# Patient Record
Sex: FEMALE | Race: WHITE | NOT HISPANIC OR LATINO | Employment: FULL TIME | URBAN - METROPOLITAN AREA
[De-identification: names, ages, dates, MRNs, and addresses within clinical notes are randomized per-mention and may not be internally consistent; named-entity substitution may affect disease eponyms.]

---

## 2017-07-14 DIAGNOSIS — Z12.31 SCREENING MAMMOGRAM FOR HIGH-RISK PATIENT: Primary | ICD-10-CM

## 2017-12-11 ENCOUNTER — HOSPITAL ENCOUNTER (OUTPATIENT)
Dept: RADIOLOGY | Facility: HOSPITAL | Age: 47
Discharge: HOME | End: 2017-12-11
Attending: FAMILY MEDICINE
Payer: COMMERCIAL

## 2017-12-11 VITALS — BODY MASS INDEX: 27.2 KG/M2 | WEIGHT: 190 LBS | HEIGHT: 70 IN

## 2017-12-11 DIAGNOSIS — Z12.31 ENCOUNTER FOR SCREENING MAMMOGRAM FOR HIGH-RISK PATIENT: ICD-10-CM

## 2017-12-11 PROCEDURE — 77067 SCR MAMMO BI INCL CAD: CPT | Mod: TC

## 2017-12-11 PROCEDURE — 77067 SCR MAMMO BI INCL CAD: CPT | Mod: 26,,, | Performed by: RADIOLOGY

## 2018-02-26 ENCOUNTER — OFFICE VISIT (OUTPATIENT)
Dept: OBSTETRICS AND GYNECOLOGY | Facility: CLINIC | Age: 48
End: 2018-02-26
Payer: COMMERCIAL

## 2018-02-26 VITALS
BODY MASS INDEX: 29.01 KG/M2 | WEIGHT: 202.63 LBS | DIASTOLIC BLOOD PRESSURE: 86 MMHG | HEIGHT: 70 IN | SYSTOLIC BLOOD PRESSURE: 124 MMHG

## 2018-02-26 DIAGNOSIS — Z01.419 ENCOUNTER FOR GYNECOLOGICAL EXAMINATION WITHOUT ABNORMAL FINDING: Primary | ICD-10-CM

## 2018-02-26 DIAGNOSIS — N89.8 VAGINAL DISCHARGE: ICD-10-CM

## 2018-02-26 DIAGNOSIS — N32.89 BLADDER SPASM: ICD-10-CM

## 2018-02-26 DIAGNOSIS — R35.0 URINARY FREQUENCY: ICD-10-CM

## 2018-02-26 LAB
C TRACH DNA SPEC QL NAA+PROBE: NOT DETECTED
CANDIDA RRNA VAG QL PROBE: NEGATIVE
G VAGINALIS RRNA GENITAL QL PROBE: NEGATIVE
N GONORRHOEA DNA SPEC QL NAA+PROBE: NOT DETECTED
T VAGINALIS RRNA GENITAL QL PROBE: NEGATIVE

## 2018-02-26 PROCEDURE — 87480 CANDIDA DNA DIR PROBE: CPT

## 2018-02-26 PROCEDURE — 87077 CULTURE AEROBIC IDENTIFY: CPT

## 2018-02-26 PROCEDURE — 87088 URINE BACTERIA CULTURE: CPT

## 2018-02-26 PROCEDURE — 87186 SC STD MICRODIL/AGAR DIL: CPT

## 2018-02-26 PROCEDURE — 87491 CHLMYD TRACH DNA AMP PROBE: CPT

## 2018-02-26 PROCEDURE — 87086 URINE CULTURE/COLONY COUNT: CPT

## 2018-02-26 PROCEDURE — 99386 PREV VISIT NEW AGE 40-64: CPT | Mod: S$GLB,,, | Performed by: OBSTETRICS & GYNECOLOGY

## 2018-02-26 PROCEDURE — 88175 CYTOPATH C/V AUTO FLUID REDO: CPT

## 2018-02-26 PROCEDURE — 99999 PR PBB SHADOW E&M-EST. PATIENT-LVL III: CPT | Mod: PBBFAC,,, | Performed by: OBSTETRICS & GYNECOLOGY

## 2018-02-26 RX ORDER — OXYBUTYNIN CHLORIDE 5 MG/1
5 TABLET ORAL 3 TIMES DAILY
Qty: 30 TABLET | Refills: 0 | Status: SHIPPED | OUTPATIENT
Start: 2018-02-26 | End: 2018-03-21

## 2018-02-26 RX ORDER — PHENAZOPYRIDINE HYDROCHLORIDE 200 MG/1
TABLET, FILM COATED ORAL
COMMUNITY
Start: 2018-02-24 | End: 2018-03-21 | Stop reason: ALTCHOICE

## 2018-02-26 NOTE — PROGRESS NOTES
HISTORY OF PRESENT ILLNESS:    Harpreet Peralta is a 48 y.o. female, , Patient's last menstrual period was 2018.,  presents for a routine exam. Patient reports being seen in the urgent care for UTI, symptoms improved. Patient took Bactrim and Macrobid, currently taking pyridium. Patient also treated for BV. Symptoms resolved  but now dysuria & pain has returned for the last 3 days.     Past Medical History:   Diagnosis Date    Anxiety     Depression     Urinary tract infection 2013    Staphylococcus saprophyticus       History reviewed. No pertinent surgical history.    MEDICATIONS AND ALLERGIES:      Current Outpatient Prescriptions:     citalopram (CELEXA) 10 MG tablet, Take 10 mg by mouth once daily.  , Disp: , Rfl:     citalopram (CELEXA) 20 MG tablet, Take 20 mg by mouth once daily.  , Disp: , Rfl:     oxybutynin (DITROPAN) 5 MG Tab, Take 1 tablet (5 mg total) by mouth 3 (three) times daily., Disp: 30 tablet, Rfl: 0    phenazopyridine (PYRIDIUM) 200 MG tablet, , Disp: , Rfl:     Review of patient's allergies indicates:  No Known Allergies    Family History   Problem Relation Age of Onset    Breast cancer Maternal Uncle 60    Breast cancer Paternal Aunt 70       Social History     Social History    Marital status: Single     Spouse name: N/A    Number of children: N/A    Years of education: N/A     Occupational History    Not on file.     Social History Main Topics    Smoking status: Never Smoker    Smokeless tobacco: Not on file    Alcohol use Yes      Comment: socially    Drug use: No    Sexual activity: Yes     Partners: Male     Other Topics Concern    Not on file     Social History Narrative    No narrative on file       COMPREHENSIVE GYN HISTORY:  PAP History: Denies abnormal Paps.  Infection History: Denies STDs. Denies PID.  Benign History: Denies uterine fibroids. Denies ovarian cysts. Denies endometriosis. Denies other conditions.  Cancer History: Denies cervical  "cancer. Denies uterine cancer or hyperplasia. Denies ovarian cancer. Denies vulvar cancer or pre-cancer. Denies vaginal cancer or pre-cancer. Denies breast cancer. Denies colon cancer.  Sexual Activity History: Reports currently being sexually active  Menstrual History: Monthly. Mod then light flow.   Dysmenorrhea History: Reports mild dysmenorrhea.       ROS:  GENERAL: No weight changes. No swelling. No fatigue. No fever.  CARDIOVASCULAR: No chest pain. No shortness of breath. No leg cramps.   NEUROLOGICAL: No headaches. No vision changes.  BREASTS: No pain. No lumps. No discharge.  ABDOMEN: No pain. No nausea. No vomiting. No diarrhea. No constipation.  REPRODUCTIVE: No abnormal bleeding.   VULVA: No pain. No lesions. No itching.  VAGINA: No relaxation. No itching. No odor. No discharge. No lesions.  URINARY: No incontinence. No nocturia. No frequency. No dysuria.    /86   Ht 5' 10" (1.778 m)   Wt 91.9 kg (202 lb 9.6 oz)   LMP 02/12/2018   BMI 29.07 kg/m²     PE:  APPEARANCE: Well nourished, well developed, in no acute distress.  AFFECT: WNL, alert and oriented x 3.  SKIN: No acne or hirsutism.  NECK: Neck symmetric, without masses or thyromegaly.  NODES: No inguinal, cervical, axillary or femoral lymph node enlargement.  CHEST: Good respiratory effort.   ABDOMEN: Soft. No tenderness or masses. No hepatosplenomegaly. No hernias.  BREASTS: Symmetrical, no skin changes, visible lesions, palpable masses or nipple discharge bilaterally.  PELVIC: External female genitalia without lesions.  Female hair distribution. Adequate perineal body, Normal urethral meatus. Vagina moist and well rugated without lesions or discharge.  No significant cystocele or rectocele present. Cervix pink without lesions, discharge or tenderness. Uterus is 4-6 week size, regular, mobile and nontender. Adnexa without masses or tenderness.  EXTREMITIES: No edema    DIAGNOSIS:  1. Encounter for gynecological examination without abnormal " finding    2. Vaginal discharge    3. Urinary frequency    4. Bladder spasm        COUNSELING:  The patient was counseled today on:  -A.C.S. Pap and pelvic exam guidelines (pap every 3 years), recomendations for yearly mammogram;  -to follow up with her PCP for other health maintenance.    FOLLOW-UP with me annually.   Urine culture today  Patient on Pyridium   Patient to follow up with PCP for recurrent UTI.

## 2018-02-28 ENCOUNTER — OFFICE VISIT (OUTPATIENT)
Dept: INTERNAL MEDICINE | Facility: CLINIC | Age: 48
End: 2018-02-28
Payer: COMMERCIAL

## 2018-02-28 VITALS
WEIGHT: 201.94 LBS | OXYGEN SATURATION: 96 % | SYSTOLIC BLOOD PRESSURE: 114 MMHG | HEART RATE: 77 BPM | HEIGHT: 71 IN | DIASTOLIC BLOOD PRESSURE: 68 MMHG | BODY MASS INDEX: 28.27 KG/M2

## 2018-02-28 DIAGNOSIS — N39.0 LOWER URINARY TRACT INFECTIOUS DISEASE: Primary | ICD-10-CM

## 2018-02-28 DIAGNOSIS — R30.0 DYSURIA: ICD-10-CM

## 2018-02-28 DIAGNOSIS — R10.2 PELVIC PAIN: ICD-10-CM

## 2018-02-28 LAB
BACTERIA #/AREA URNS AUTO: ABNORMAL /HPF
BACTERIA UR CULT: NORMAL
BILIRUB UR QL STRIP: NEGATIVE
CLARITY UR REFRACT.AUTO: ABNORMAL
COLOR UR AUTO: ABNORMAL
GLUCOSE UR QL STRIP: NEGATIVE
HGB UR QL STRIP: ABNORMAL
HYALINE CASTS UR QL AUTO: 0 /LPF
KETONES UR QL STRIP: NEGATIVE
LEUKOCYTE ESTERASE UR QL STRIP: ABNORMAL
MICROSCOPIC COMMENT: ABNORMAL
NITRITE UR QL STRIP: POSITIVE
PH UR STRIP: 7 [PH] (ref 5–8)
PROT UR QL STRIP: ABNORMAL
RBC #/AREA URNS AUTO: 13 /HPF (ref 0–4)
SP GR UR STRIP: 1.01 (ref 1–1.03)
SQUAMOUS #/AREA URNS AUTO: 1 /HPF
URN SPEC COLLECT METH UR: ABNORMAL
UROBILINOGEN UR STRIP-ACNC: ABNORMAL EU/DL
WBC #/AREA URNS AUTO: >100 /HPF (ref 0–5)
WBC CLUMPS UR QL AUTO: ABNORMAL

## 2018-02-28 PROCEDURE — 81001 URINALYSIS AUTO W/SCOPE: CPT

## 2018-02-28 PROCEDURE — 99999 PR PBB SHADOW E&M-EST. PATIENT-LVL III: CPT | Mod: PBBFAC,,, | Performed by: NURSE PRACTITIONER

## 2018-02-28 PROCEDURE — 99213 OFFICE O/P EST LOW 20 MIN: CPT | Mod: S$GLB,,, | Performed by: NURSE PRACTITIONER

## 2018-02-28 PROCEDURE — 3008F BODY MASS INDEX DOCD: CPT | Mod: S$GLB,,, | Performed by: NURSE PRACTITIONER

## 2018-02-28 RX ORDER — CIPROFLOXACIN 250 MG/1
250 TABLET, FILM COATED ORAL EVERY 12 HOURS
Qty: 6 TABLET | Refills: 0 | Status: SHIPPED | OUTPATIENT
Start: 2018-02-28 | End: 2018-03-21

## 2018-02-28 NOTE — PROGRESS NOTES
INTERNAL MEDICINE PROGRESS/URGENT CARE NOTE    CHIEF COMPLAINT     Chief Complaint   Patient presents with    Dysuria     10 days , recent UC visit       HPI     Harpreet Peralta is a 48 y.o. female with frequent UTIs and functional constipation who presents for an urgent visit today.    Was seen in urgent care a couple times for UTI with dysuria, pelvic pain and pressure x 1 month. Was treated with bactrim and Macrobid and oxybutynin and pyridium. Was also treated for BV with Flagyl. Reports continued pain and pressure despite antibiotics and pain meds.   No imaging done.   Has h/o kidney stones.     Past Medical History:  Past Medical History:   Diagnosis Date    Anxiety     Depression     Urinary tract infection 8/30/2013    Staphylococcus saprophyticus       Home Medications:  Prior to Admission medications    Medication Sig Start Date End Date Taking? Authorizing Provider   oxybutynin (DITROPAN) 5 MG Tab Take 1 tablet (5 mg total) by mouth 3 (three) times daily. 2/26/18 2/26/19 Yes Claudia Fountain MD   phenazopyridine (PYRIDIUM) 200 MG tablet  2/24/18  Yes Historical Provider, MD   citalopram (CELEXA) 10 MG tablet Take 10 mg by mouth once daily.      Historical Provider, MD   citalopram (CELEXA) 20 MG tablet Take 20 mg by mouth once daily.      Historical Provider, MD       Review of Systems:  Review of Systems   Constitutional: Negative for chills, fatigue and fever.   Gastrointestinal: Negative for abdominal pain, constipation, diarrhea, nausea and vomiting.   Genitourinary: Positive for dysuria, pelvic pain and urgency. Negative for flank pain.   Skin: Negative for rash.   Neurological: Negative for dizziness, light-headedness and headaches.       Health Maintainence:   Immunizations:  Health Maintenance       Date Due Completion Date    TETANUS VACCINE 02/11/1986 ---    Pap Smear with HPV Cotest 11/21/2016 11/21/2013    Influenza Vaccine 08/01/2017 ---    Colonoscopy 02/11/2018 ---    Lipid Panel  "08/16/2018 8/16/2013    Mammogram 12/11/2019 12/11/2017           PHYSICAL EXAM     /68 (BP Location: Left arm, Patient Position: Sitting, BP Method: Large (Manual))   Pulse 77   Ht 5' 11" (1.803 m)   Wt 91.6 kg (201 lb 15.1 oz)   LMP 02/12/2018 (Within Days)   SpO2 96%   BMI 28.17 kg/m²     Physical Exam   Constitutional: She is oriented to person, place, and time. She appears well-developed and well-nourished.   HENT:   Head: Normocephalic.   Right Ear: External ear normal.   Left Ear: External ear normal.   Nose: Nose normal.   Mouth/Throat: Oropharynx is clear and moist. No oropharyngeal exudate.   Eyes: Pupils are equal, round, and reactive to light.   Neck: Neck supple. No JVD present. No tracheal deviation present. No thyromegaly present.   Cardiovascular: Normal rate, regular rhythm, normal heart sounds and intact distal pulses.  Exam reveals no gallop and no friction rub.    No murmur heard.  Pulmonary/Chest: Effort normal and breath sounds normal. No respiratory distress. She has no wheezes. She has no rales.   Abdominal: Soft. Bowel sounds are normal. She exhibits no distension. There is no tenderness. There is no CVA tenderness.   Musculoskeletal: Normal range of motion. She exhibits no edema or tenderness.   Lymphadenopathy:     She has no cervical adenopathy.   Neurological: She is alert and oriented to person, place, and time.   Skin: Skin is warm and dry. No rash noted.   Psychiatric: She has a normal mood and affect. Her behavior is normal.   Vitals reviewed.      LABS     No results found for: LABA1C, HGBA1C  CMP  No results found for: NA, K, CL, CO2, GLU, BUN, CREATININE, CALCIUM, PROT, ALBUMIN, BILITOT, ALKPHOS, AST, ALT, ANIONGAP, ESTGFRAFRICA, EGFRNONAA  No results found for: WBC, HGB, HCT, MCV, PLT  No results found for: CHOL  No results found for: HDL  No results found for: LDLCALC  No results found for: TRIG  No results found for: CHOLHDL  No results found for: TSH, N3SDTGK, " O7ZNLCB, THYROIDAB    ASSESSMENT/PLAN     Harpreet Peralta is a 48 y.o. female with  Past Medical History:   Diagnosis Date    Anxiety     Depression     Urinary tract infection 8/30/2013    Staphylococcus saprophyticus     Lower urinary tract infectious disease- preliminary urine culture shows e. Coli. No sensitivity report yet but failed bactrim and Macrobid. Will start cipro bid x 3 days. Short course. Increase fluids. Will cont pyridium and ditropan as needed.   -     ciprofloxacin HCl (CIPRO) 250 MG tablet; Take 1 tablet (250 mg total) by mouth every 12 (twelve) hours.  Dispense: 6 tablet; Refill: 0    Dysuria  -     Urinalysis    Pelvic pain  -     Urinalysis    Will f/u by phone on Friday (2 days) to go over culture report     Follow up with PCP     Patient education provided from Kat. Patient was counseled on when and how to seek emergent care.       Augusta KIRBY, SAVITA, FNP-c   Department of Internal Medicine - [facility]  8:21 AM

## 2018-02-28 NOTE — MEDICAL/APP STUDENT
Subjective:       Patient ID: Harpreet Peralta is a 48 y.o. female.    Chief Complaint: Dysuria (10 days , recent UC visit)    49 yo female presents for symptoms of recurrent UTI. Reports started a month ago.  Reports multiple medications from urgent care, with unresolved symptoms.    Reports urgent care physician was not concerned for kidney stone and did not do imaging. Patient reports burning on urination, suprapubic pain and pressure.  Denies back pain and fever.  Reports taking ditropan and pyridium with minimal relief.        Review of Systems   Constitutional: Negative for chills, fatigue and fever.   Cardiovascular: Negative for chest pain and palpitations.   Gastrointestinal: Negative for diarrhea, nausea and vomiting.   Genitourinary: Positive for dysuria and frequency. Negative for difficulty urinating, flank pain and hematuria.   Musculoskeletal: Negative for back pain.       Objective:      Physical Exam   Constitutional: She is oriented to person, place, and time. She appears well-developed and well-nourished.   HENT:   Head: Normocephalic and atraumatic.   Eyes: Pupils are equal, round, and reactive to light.   Neck: Normal range of motion.   Cardiovascular: Normal rate and regular rhythm.    Pulmonary/Chest: Effort normal and breath sounds normal.   Abdominal: Soft. Bowel sounds are normal. There is tenderness (suprapubic).   Neurological: She is alert and oriented to person, place, and time.   Skin: Skin is warm and dry.       Assessment:         Plan:

## 2018-03-01 ENCOUNTER — TELEPHONE (OUTPATIENT)
Dept: OBSTETRICS AND GYNECOLOGY | Facility: CLINIC | Age: 48
End: 2018-03-01

## 2018-03-01 ENCOUNTER — PATIENT MESSAGE (OUTPATIENT)
Dept: INTERNAL MEDICINE | Facility: CLINIC | Age: 48
End: 2018-03-01

## 2018-03-01 ENCOUNTER — PATIENT MESSAGE (OUTPATIENT)
Dept: OBSTETRICS AND GYNECOLOGY | Facility: CLINIC | Age: 48
End: 2018-03-01

## 2018-03-01 DIAGNOSIS — N39.0 URINARY TRACT INFECTION WITHOUT HEMATURIA, SITE UNSPECIFIED: Primary | ICD-10-CM

## 2018-03-01 RX ORDER — NITROFURANTOIN 25; 75 MG/1; MG/1
100 CAPSULE ORAL 2 TIMES DAILY
Qty: 14 CAPSULE | Refills: 0 | Status: SHIPPED | OUTPATIENT
Start: 2018-03-01 | End: 2018-03-08

## 2018-03-16 ENCOUNTER — PATIENT MESSAGE (OUTPATIENT)
Dept: INTERNAL MEDICINE | Facility: CLINIC | Age: 48
End: 2018-03-16

## 2018-03-16 ENCOUNTER — OFFICE VISIT (OUTPATIENT)
Dept: INTERNAL MEDICINE | Facility: CLINIC | Age: 48
End: 2018-03-16
Payer: COMMERCIAL

## 2018-03-16 ENCOUNTER — LAB VISIT (OUTPATIENT)
Dept: LAB | Facility: HOSPITAL | Age: 48
End: 2018-03-16
Payer: COMMERCIAL

## 2018-03-16 VITALS
SYSTOLIC BLOOD PRESSURE: 124 MMHG | OXYGEN SATURATION: 99 % | BODY MASS INDEX: 28.64 KG/M2 | DIASTOLIC BLOOD PRESSURE: 74 MMHG | TEMPERATURE: 98 F | HEIGHT: 71 IN | HEART RATE: 71 BPM | WEIGHT: 204.56 LBS

## 2018-03-16 DIAGNOSIS — M54.50 ACUTE RIGHT-SIDED LOW BACK PAIN WITHOUT SCIATICA: ICD-10-CM

## 2018-03-16 DIAGNOSIS — R35.0 URINARY FREQUENCY: Primary | ICD-10-CM

## 2018-03-16 LAB
ANION GAP SERPL CALC-SCNC: 7 MMOL/L
BASOPHILS # BLD AUTO: 0.07 K/UL
BASOPHILS NFR BLD: 1.1 %
BILIRUB SERPL-MCNC: NORMAL MG/DL
BLOOD URINE, POC: NORMAL
BUN SERPL-MCNC: 13 MG/DL
CALCIUM SERPL-MCNC: 11.1 MG/DL
CHLORIDE SERPL-SCNC: 104 MMOL/L
CO2 SERPL-SCNC: 27 MMOL/L
COLOR, POC UA: YELLOW
CREAT SERPL-MCNC: 0.7 MG/DL
DIFFERENTIAL METHOD: ABNORMAL
EOSINOPHIL # BLD AUTO: 0.2 K/UL
EOSINOPHIL NFR BLD: 2.4 %
ERYTHROCYTE [DISTWIDTH] IN BLOOD BY AUTOMATED COUNT: 11.7 %
EST. GFR  (AFRICAN AMERICAN): >60 ML/MIN/1.73 M^2
EST. GFR  (NON AFRICAN AMERICAN): >60 ML/MIN/1.73 M^2
GLUCOSE SERPL-MCNC: 78 MG/DL
GLUCOSE UR QL STRIP: NORMAL
HCT VFR BLD AUTO: 38.3 %
HGB BLD-MCNC: 12.8 G/DL
KETONES UR QL STRIP: NORMAL
LEUKOCYTE ESTERASE URINE, POC: NORMAL
LYMPHOCYTES # BLD AUTO: 2.1 K/UL
LYMPHOCYTES NFR BLD: 34.6 %
MCH RBC QN AUTO: 33.5 PG
MCHC RBC AUTO-ENTMCNC: 33.4 G/DL
MCV RBC AUTO: 100 FL
MONOCYTES # BLD AUTO: 0.6 K/UL
MONOCYTES NFR BLD: 9.2 %
NEUTROPHILS # BLD AUTO: 3.3 K/UL
NEUTROPHILS NFR BLD: 52.5 %
NITRITE, POC UA: NORMAL
NRBC BLD-RTO: 0 /100 WBC
PH, POC UA: 7
PLATELET # BLD AUTO: 381 K/UL
PMV BLD AUTO: 9.3 FL
POTASSIUM SERPL-SCNC: 4.4 MMOL/L
PROTEIN, POC: NORMAL
RBC # BLD AUTO: 3.82 M/UL
SODIUM SERPL-SCNC: 138 MMOL/L
SPECIFIC GRAVITY, POC UA: 1
UROBILINOGEN, POC UA: NORMAL
WBC # BLD AUTO: 6.19 K/UL

## 2018-03-16 PROCEDURE — 99999 PR PBB SHADOW E&M-EST. PATIENT-LVL IV: CPT | Mod: PBBFAC,,, | Performed by: NURSE PRACTITIONER

## 2018-03-16 PROCEDURE — 87086 URINE CULTURE/COLONY COUNT: CPT

## 2018-03-16 PROCEDURE — 81002 URINALYSIS NONAUTO W/O SCOPE: CPT | Mod: S$GLB,,, | Performed by: NURSE PRACTITIONER

## 2018-03-16 PROCEDURE — 80048 BASIC METABOLIC PNL TOTAL CA: CPT

## 2018-03-16 PROCEDURE — 85025 COMPLETE CBC W/AUTO DIFF WBC: CPT

## 2018-03-16 PROCEDURE — 36415 COLL VENOUS BLD VENIPUNCTURE: CPT

## 2018-03-16 PROCEDURE — 99213 OFFICE O/P EST LOW 20 MIN: CPT | Mod: 25,S$GLB,, | Performed by: NURSE PRACTITIONER

## 2018-03-16 NOTE — PROGRESS NOTES
Subjective:       Patient ID: Harpreet Peralta is a 48 y.o. female.    Chief Complaint: Urinary Tract Infection    HPI:  48 female that presents to clinic today for right sided lower back pain and urinary frequency.    Patient reports having a history of UTIs.  States that she is really not having any burning with urination but just some increased urinary frequency.  Reports that she woke up this morning with some right sided back pain.  States no trauma to area or any heavy lifting.    Denies any fever, gross hematuria, dysuria or abdominal pain.    Review of Systems   Constitutional: Negative for appetite change, chills, fatigue and fever.   Respiratory: Negative for apnea, cough, shortness of breath and wheezing.    Cardiovascular: Negative for chest pain, palpitations and leg swelling.   Gastrointestinal: Negative for abdominal pain, constipation, diarrhea, nausea and vomiting.   Genitourinary: Positive for frequency and urgency. Negative for dysuria, flank pain, hematuria and pelvic pain.   Musculoskeletal: Positive for back pain. Negative for arthralgias, myalgias, neck pain and neck stiffness.   Neurological: Negative for dizziness, light-headedness, numbness and headaches.   Psychiatric/Behavioral: Negative for behavioral problems.       Objective:      Physical Exam   Constitutional: She is oriented to person, place, and time. She appears well-developed and well-nourished. No distress.   Neck: Normal range of motion. Neck supple. No thyromegaly present.   Cardiovascular: Normal rate, regular rhythm, normal heart sounds and intact distal pulses.    No murmur heard.  Pulmonary/Chest: Effort normal and breath sounds normal. No respiratory distress. She has no wheezes. She has no rales.   Abdominal: Soft. Bowel sounds are normal. She exhibits no distension and no mass. There is no tenderness.   Musculoskeletal:        Lumbar back: She exhibits normal range of motion, no tenderness, no bony tenderness, no swelling,  no pain and no spasm.   Good ROM of back.  There is some mild tenderness with flexion, extension, left and right lateral rotation.  No bilateral CVA tenderness.   Lymphadenopathy:     She has no cervical adenopathy.   Neurological: She is alert and oriented to person, place, and time. No sensory deficit.   Psychiatric: Her behavior is normal.       Assessment:       1. Urinary frequency    2. Acute right-sided low back pain without sciatica        Plan:     1. Urinary frequency   -UA is unremarkable today for UTI but given history I am still going to send for culture.  -Will refer to urology for further evaluation and management given history of recurrent UTIs and ongoing symptoms.  -Encouraged to continue drinking plenty of fluids.        2. Acute right-sided low back pain without sciatica   -Pain appears to be musculoskeletal in nature.  -Will go ahead and check a bmp and cbc to assess kidney function and rule out underlying infection.  -Encouraged use of advil and heat therapy for pain relief.

## 2018-03-18 LAB — BACTERIA UR CULT: NO GROWTH

## 2018-03-19 ENCOUNTER — PATIENT MESSAGE (OUTPATIENT)
Dept: INTERNAL MEDICINE | Facility: CLINIC | Age: 48
End: 2018-03-19

## 2018-03-21 ENCOUNTER — OFFICE VISIT (OUTPATIENT)
Dept: INTERNAL MEDICINE | Facility: CLINIC | Age: 48
End: 2018-03-21
Payer: COMMERCIAL

## 2018-03-21 VITALS
OXYGEN SATURATION: 98 % | SYSTOLIC BLOOD PRESSURE: 115 MMHG | WEIGHT: 205.69 LBS | HEART RATE: 64 BPM | DIASTOLIC BLOOD PRESSURE: 70 MMHG | BODY MASS INDEX: 28.8 KG/M2 | HEIGHT: 71 IN

## 2018-03-21 DIAGNOSIS — G89.29 CHRONIC PAIN OF LEFT KNEE: ICD-10-CM

## 2018-03-21 DIAGNOSIS — N20.0 KIDNEY STONES: ICD-10-CM

## 2018-03-21 DIAGNOSIS — M25.562 CHRONIC PAIN OF LEFT KNEE: ICD-10-CM

## 2018-03-21 DIAGNOSIS — N95.1 PERIMENOPAUSAL: ICD-10-CM

## 2018-03-21 DIAGNOSIS — Z76.89 ESTABLISHING CARE WITH NEW DOCTOR, ENCOUNTER FOR: Primary | ICD-10-CM

## 2018-03-21 DIAGNOSIS — Z12.11 SCREEN FOR COLON CANCER: ICD-10-CM

## 2018-03-21 DIAGNOSIS — K59.09 CHRONIC CONSTIPATION: ICD-10-CM

## 2018-03-21 DIAGNOSIS — F41.1 GAD (GENERALIZED ANXIETY DISORDER): ICD-10-CM

## 2018-03-21 PROCEDURE — 99999 PR PBB SHADOW E&M-EST. PATIENT-LVL III: CPT | Mod: PBBFAC,,, | Performed by: INTERNAL MEDICINE

## 2018-03-21 PROCEDURE — 99396 PREV VISIT EST AGE 40-64: CPT | Mod: S$GLB,,, | Performed by: INTERNAL MEDICINE

## 2018-03-21 RX ORDER — CITALOPRAM 20 MG/1
20 TABLET, FILM COATED ORAL DAILY
Qty: 90 TABLET | Refills: 3 | Status: SHIPPED | OUTPATIENT
Start: 2018-03-21 | End: 2018-08-27

## 2018-03-21 RX ORDER — CITALOPRAM 10 MG/1
10 TABLET ORAL DAILY
Qty: 90 TABLET | Refills: 3 | Status: SHIPPED | OUTPATIENT
Start: 2018-03-21 | End: 2018-08-27 | Stop reason: SDUPTHER

## 2018-03-21 NOTE — PATIENT INSTRUCTIONS
Eating a High-Fiber Diet  Fiber is what gives strength and structure to plants. Most grains, beans, vegetables, and fruits contain fiber. Foods rich in fiber are often low in calories and fat, and they fill you up more. They may also reduce your risks for certain health problems. To find out the amount of fiber in canned, packaged, or frozen foods, read the Nutrition Facts label. It tells you how much fiber is in a serving.    Types of fiber and their benefits  There are two types of fiber: insoluble and soluble. They both aid digestion and help you maintain a healthy weight.  · Insoluble fiber. This is found in whole grains, cereals, certain fruits and vegetables such as apple skin, corn, and carrots. Insoluble fiber may prevent constipation and reduce the risk for certain types of cancer.  · Soluble fiber. This type of fiber is in oats, beans, and certain fruits and vegetables such as strawberries and peas. Soluble fiber can reduce cholesterol, which may help lower the risk for heart disease. It also helps control blood sugar levels.  Look for high-fiber foods  Try these foods to add fiber to your diet:  · Whole-grain breads and cereals. Try to eat 6 to 8 ounces a day. Include wheat and oat bran cereals, whole-wheat muffins or toast, and corn tortillas in your meals.  · Fruits. Try to eat 2 cups a day. Apples, oranges, strawberries, pears, and bananas are good sources. (Note: Fruit juice is low in fiber.)  · Vegetables. Try to eat at least 2.5 cups a day. Add asparagus, carrots, broccoli, peas, and corn to your meals.  · Beans. One cup of cooked lentils gives you over 15 grams of fiber. Try navy beans, lentils, and chickpeas.  · Seeds. A small handful of seeds gives you about 3 grams of fiber. Try sunflower seeds.  Keep track of your fiber  Keep track of how much fiber you eat. Start by reading food labels. Then eat a variety of foods high in fiber. As you begin to eat more fiber, ask your healthcare provider  how much water you should be drinking to keep your digestive system working smoothly.  You should aim for a certain amount of fiber in your diet each day. If you are a woman, that amount is between 25 and 28 grams per day. Men should aim for 30 to 33 grams per day. After age 50, your daily fiber needs drop to 22 grams for women and 28 grams for men.  Before you reach for the fiber supplements, think about this. Fiber is found naturally in healthy whole foods. It gives you that feeling of fullness after you eat. Taking fiber supplements or eating fiber-enriched foods will not give you this full feeling.  Your fiber intake is a good measure for the quality of your overall diet. If you are missing out on your daily amount of fiber, you may be lacking other important nutrients as well.  Date Last Reviewed: 5/11/2015 © 2000-2017 [employer]. [address] [place] [id number]. All rights reserved. This information is not intended as a substitute for professional medical care. Always follow your healthcare professional's instructions.      Citrucel orange powder two tablespoons before before lunch

## 2018-03-24 NOTE — PROGRESS NOTES
Subjective:       Patient ID: Harpreet Peralta is a 48 y.o. female.    Chief Complaint: Establish Care and Knee Pain ( comes and goes pain scale 5)  here to establish care   Very pleasant woman  Entire chart reviewed, PMx, FHx, and Social History updated  Had serious depression, endogenous started citalopram and has copntinued b/o SANDHYA. High functioning  No sxs now  Discussed  Health Maintenance       Date Due Completion Date    TETANUS VACCINE 02/11/1986 ---    Influenza Vaccine 08/01/2017 ---    Colonoscopy 02/11/2018 ---    Lipid Panel 08/16/2018 8/16/2013    Mammogram 12/11/2019 12/11/2017    Pap Smear with HPV Cotest 02/26/2021 2/26/2018          HPI  Review of Systems   Constitutional: Negative for activity change, appetite change, chills, fatigue, fever and unexpected weight change.   HENT: Negative for hearing loss.    Eyes: Negative for visual disturbance.   Respiratory: Negative for cough, chest tightness, shortness of breath and wheezing.    Cardiovascular: Negative for chest pain, palpitations and leg swelling.   Gastrointestinal: Negative for abdominal pain, constipation, nausea and vomiting.   Genitourinary: Negative for dysuria, frequency and urgency.   Musculoskeletal: Negative for arthralgias, back pain, gait problem, joint swelling and myalgias.   Skin: Negative for rash.   Neurological: Negative for light-headedness and headaches.   Psychiatric/Behavioral: Negative for dysphoric mood and sleep disturbance. The patient is not nervous/anxious.        Objective:      Physical Exam   Constitutional: She appears well-nourished.   HENT:   Head: Atraumatic.   Eyes: Conjunctivae are normal. No scleral icterus.   Neck: Neck supple.   Cardiovascular: Normal rate and regular rhythm.    Pulmonary/Chest: Effort normal and breath sounds normal.   Abdominal: Soft. There is no tenderness.   Musculoskeletal: She exhibits no edema.   Lymphadenopathy:     She has no cervical adenopathy.   Neurological: She is alert.    Skin: Skin is warm and dry.   Psychiatric: She has a normal mood and affect. Her behavior is normal.   Nursing note and vitals reviewed.      Assessment:       1. Establishing care with new doctor, encounter for    2. Kidney stones, age 16, 38    3. Perimenopausal    4. SANDHYA (generalized anxiety disorder)    5. Chronic constipation    6. Screen for colon cancer    7. Chronic pain of left knee        Plan:   Harpreet was seen today for establish care and knee pain.    Diagnoses and all orders for this visit:    Establishing care with new doctor, encounter for  -     CBC auto differential; Future  -     Comprehensive metabolic panel; Future  -     Lipid panel; Future  -     Hemoglobin A1c; Future  -     TSH; Future  -     EKG 12-lead; Future    Kidney stones, age 16, 38  -     PTH, intact; Future    Perimenopausal    SANDHYA (generalized anxiety disorder)    Chronic constipation. Fiber. Labels.    Screen for colon cancer  -     Case request GI: COLONOSCOPY    Chronic pain of left knee  -     X-ray AP Standing Knees with Left Lateral; Future    Other orders  -     citalopram (CELEXA) 20 MG tablet; Take 1 tablet (20 mg total) by mouth once daily.  -     citalopram (CELEXA) 10 MG tablet; Take 1 tablet (10 mg total) by mouth once daily.      Medication List with Changes/Refills   Changed and/or Refilled Medications    Modified Medication Previous Medication    CITALOPRAM (CELEXA) 10 MG TABLET citalopram (CELEXA) 10 MG tablet       Take 1 tablet (10 mg total) by mouth once daily.    Take 10 mg by mouth once daily.      CITALOPRAM (CELEXA) 20 MG TABLET citalopram (CELEXA) 20 MG tablet       Take 1 tablet (20 mg total) by mouth once daily.    Take 20 mg by mouth once daily.     Discontinued Medications    CIPROFLOXACIN HCL (CIPRO) 250 MG TABLET    Take 1 tablet (250 mg total) by mouth every 12 (twelve) hours.    OXYBUTYNIN (DITROPAN) 5 MG TAB    Take 1 tablet (5 mg total) by mouth 3 (three) times daily.    PHENAZOPYRIDINE  (PYRIDIUM) 200 MG TABLET

## 2018-07-18 ENCOUNTER — CLINICAL SUPPORT (OUTPATIENT)
Dept: OTHER | Facility: CLINIC | Age: 48
End: 2018-07-18
Payer: COMMERCIAL

## 2018-07-18 DIAGNOSIS — Z00.8 HEALTH EXAMINATION IN POPULATION SURVEYS: Primary | ICD-10-CM

## 2018-07-18 PROCEDURE — 80061 LIPID PANEL: CPT | Mod: QW,S$GLB,, | Performed by: INTERNAL MEDICINE

## 2018-07-18 PROCEDURE — 82947 ASSAY GLUCOSE BLOOD QUANT: CPT | Mod: QW,S$GLB,, | Performed by: INTERNAL MEDICINE

## 2018-07-18 PROCEDURE — 99401 PREV MED CNSL INDIV APPRX 15: CPT | Mod: S$GLB,,, | Performed by: INTERNAL MEDICINE

## 2018-07-19 VITALS
SYSTOLIC BLOOD PRESSURE: 128 MMHG | DIASTOLIC BLOOD PRESSURE: 84 MMHG | BODY MASS INDEX: 28.98 KG/M2 | HEIGHT: 71 IN | WEIGHT: 207 LBS

## 2018-07-19 LAB
GLUCOSE SERPL-MCNC: 91 MG/DL (ref 60–140)
POC CHOLESTEROL, HDL: 53 MG/DL (ref 40–?)
POC CHOLESTEROL, LDL: 173 MG/DL (ref ?–160)
POC CHOLESTEROL, TOTAL: 276 MG/DL (ref ?–240)
POC GLUCOSE FASTING: ABNORMAL MG/DL (ref 60–110)
POC TOTAL CHOLESTEROL / HDL RATIO: 5.21 (ref ?–6)
POC TRIGLYCERIDES: 252 MG/DL (ref ?–160)

## 2018-08-27 ENCOUNTER — PATIENT MESSAGE (OUTPATIENT)
Dept: INTERNAL MEDICINE | Facility: CLINIC | Age: 48
End: 2018-08-27

## 2018-08-27 RX ORDER — CITALOPRAM 20 MG/1
20 TABLET, FILM COATED ORAL DAILY
Qty: 90 TABLET | Refills: 3 | Status: SHIPPED | OUTPATIENT
Start: 2018-08-27 | End: 2018-08-28 | Stop reason: SDUPTHER

## 2018-08-27 RX ORDER — CITALOPRAM 10 MG/1
10 TABLET ORAL DAILY
Qty: 90 TABLET | Refills: 3 | Status: SHIPPED | OUTPATIENT
Start: 2018-08-27 | End: 2018-08-28 | Stop reason: SDUPTHER

## 2018-08-28 ENCOUNTER — PATIENT MESSAGE (OUTPATIENT)
Dept: INTERNAL MEDICINE | Facility: CLINIC | Age: 48
End: 2018-08-28

## 2018-08-28 RX ORDER — CITALOPRAM 20 MG/1
20 TABLET, FILM COATED ORAL DAILY
Qty: 90 TABLET | Refills: 3 | Status: SHIPPED | OUTPATIENT
Start: 2018-08-28 | End: 2019-09-17 | Stop reason: SDUPTHER

## 2018-08-28 RX ORDER — CITALOPRAM 10 MG/1
10 TABLET ORAL DAILY
Qty: 90 TABLET | Refills: 3 | Status: SHIPPED | OUTPATIENT
Start: 2018-08-28 | End: 2019-09-10 | Stop reason: SDUPTHER

## 2018-08-28 NOTE — TELEPHONE ENCOUNTER
Spoke with darren and the pharmacist stated that the 2 medication scripts for Celexa 20mg and Celexa 10 MG, were never filled because the pharmacy never received it. Pharmacist also stated that the patient just refilled the Celexa 20mg in June and it is too soon to refill the 20MG but it is ok to fill the 10MG.   Please resubmit the script for Celexa 10MG     Please advise  Thank you  FUNMILAYO Cano

## 2018-10-10 DIAGNOSIS — Z12.11 SPECIAL SCREENING FOR MALIGNANT NEOPLASMS, COLON: Primary | ICD-10-CM

## 2018-10-10 RX ORDER — POLYETHYLENE GLYCOL 3350, SODIUM SULFATE ANHYDROUS, SODIUM BICARBONATE, SODIUM CHLORIDE, POTASSIUM CHLORIDE 236; 22.74; 6.74; 5.86; 2.97 G/4L; G/4L; G/4L; G/4L; G/4L
4 POWDER, FOR SOLUTION ORAL ONCE
Qty: 4000 ML | Refills: 0 | Status: SHIPPED | OUTPATIENT
Start: 2018-10-10 | End: 2018-10-10

## 2018-11-02 ENCOUNTER — HOSPITAL ENCOUNTER (OUTPATIENT)
Facility: HOSPITAL | Age: 48
Discharge: HOME | End: 2018-11-02
Attending: INTERNAL MEDICINE | Admitting: INTERNAL MEDICINE
Payer: COMMERCIAL

## 2018-11-02 ENCOUNTER — ANESTHESIA (OUTPATIENT)
Dept: ENDOSCOPY | Facility: HOSPITAL | Age: 48
End: 2018-11-02
Payer: COMMERCIAL

## 2018-11-02 ENCOUNTER — ANESTHESIA EVENT (OUTPATIENT)
Dept: ENDOSCOPY | Facility: HOSPITAL | Age: 48
End: 2018-11-02
Payer: COMMERCIAL

## 2018-11-02 VITALS
RESPIRATION RATE: 18 BRPM | TEMPERATURE: 98 F | SYSTOLIC BLOOD PRESSURE: 125 MMHG | WEIGHT: 210 LBS | HEIGHT: 71 IN | HEART RATE: 63 BPM | OXYGEN SATURATION: 99 % | BODY MASS INDEX: 29.4 KG/M2 | DIASTOLIC BLOOD PRESSURE: 73 MMHG

## 2018-11-02 DIAGNOSIS — Z12.11 ENCOUNTER FOR SCREENING COLONOSCOPY: ICD-10-CM

## 2018-11-02 DIAGNOSIS — Z12.11 SCREEN FOR COLON CANCER: Primary | ICD-10-CM

## 2018-11-02 LAB
B-HCG UR QL: NEGATIVE
CTP QC/QA: YES

## 2018-11-02 PROCEDURE — 45385 COLONOSCOPY W/LESION REMOVAL: CPT | Performed by: INTERNAL MEDICINE

## 2018-11-02 PROCEDURE — 25000003 PHARM REV CODE 250: Performed by: INTERNAL MEDICINE

## 2018-11-02 PROCEDURE — 45385 COLONOSCOPY W/LESION REMOVAL: CPT | Mod: 33,,, | Performed by: INTERNAL MEDICINE

## 2018-11-02 PROCEDURE — 37000009 HC ANESTHESIA EA ADD 15 MINS: Performed by: INTERNAL MEDICINE

## 2018-11-02 PROCEDURE — 63600175 PHARM REV CODE 636 W HCPCS: Performed by: NURSE ANESTHETIST, CERTIFIED REGISTERED

## 2018-11-02 PROCEDURE — E9220 PRA ENDO ANESTHESIA: HCPCS | Mod: 33,,, | Performed by: NURSE ANESTHETIST, CERTIFIED REGISTERED

## 2018-11-02 PROCEDURE — 88305 TISSUE EXAM BY PATHOLOGIST: CPT | Performed by: PATHOLOGY

## 2018-11-02 PROCEDURE — 27201089 HC SNARE, DISP (ANY): Performed by: INTERNAL MEDICINE

## 2018-11-02 PROCEDURE — 81025 URINE PREGNANCY TEST: CPT | Performed by: INTERNAL MEDICINE

## 2018-11-02 PROCEDURE — 88305 TISSUE EXAM BY PATHOLOGIST: CPT | Mod: 26,,, | Performed by: PATHOLOGY

## 2018-11-02 PROCEDURE — 37000008 HC ANESTHESIA 1ST 15 MINUTES: Performed by: INTERNAL MEDICINE

## 2018-11-02 RX ORDER — PROPOFOL 10 MG/ML
VIAL (ML) INTRAVENOUS CONTINUOUS PRN
Status: DISCONTINUED | OUTPATIENT
Start: 2018-11-02 | End: 2018-11-02

## 2018-11-02 RX ORDER — LIDOCAINE HCL/PF 100 MG/5ML
SYRINGE (ML) INTRAVENOUS
Status: DISCONTINUED | OUTPATIENT
Start: 2018-11-02 | End: 2018-11-02

## 2018-11-02 RX ORDER — SODIUM CHLORIDE 9 MG/ML
INJECTION, SOLUTION INTRAVENOUS CONTINUOUS
Status: DISCONTINUED | OUTPATIENT
Start: 2018-11-02 | End: 2018-11-02 | Stop reason: HOSPADM

## 2018-11-02 RX ORDER — PROPOFOL 10 MG/ML
VIAL (ML) INTRAVENOUS
Status: DISCONTINUED | OUTPATIENT
Start: 2018-11-02 | End: 2018-11-02

## 2018-11-02 RX ADMIN — LIDOCAINE HYDROCHLORIDE 50 MG: 20 INJECTION, SOLUTION INTRAVENOUS at 09:11

## 2018-11-02 RX ADMIN — PROPOFOL 100 MG: 10 INJECTION, EMULSION INTRAVENOUS at 09:11

## 2018-11-02 RX ADMIN — PROPOFOL 60 MG: 10 INJECTION, EMULSION INTRAVENOUS at 09:11

## 2018-11-02 RX ADMIN — PROPOFOL 175 MCG/KG/MIN: 10 INJECTION, EMULSION INTRAVENOUS at 09:11

## 2018-11-02 RX ADMIN — SODIUM CHLORIDE: 0.9 INJECTION, SOLUTION INTRAVENOUS at 08:11

## 2018-11-02 NOTE — H&P
Short Stay Endoscopy History and Physical    PCP - Noel Perdomo MD  Referring Physician - Katelin Yuen MD  [address] [place] [address]    Procedure - Colonoscopy  ASA - per anesthesia  Mallampati - per anesthesia  History of Anesthesia problems - no  Family history Anesthesia problems -  no   Plan of anesthesia - General    HPI  48 y.o. female  Reason for procedure:   Screening, no fam hx colon cancer    ROS:  Constitutional: No fevers, chills, No weight loss  CV: No chest pain  Pulm: No cough, No shortness of breath  GI: see HPI    Medical History:  has a past medical history of Anxiety, Depression, and Urinary tract infection (8/30/2013).    Surgical History:  has no past surgical history on file.    Family History: family history includes Breast cancer (age of onset: 60) in her maternal uncle; Breast cancer (age of onset: 70) in her paternal aunt.. Otherwise no colon cancer, inflammatory bowel disease, or GI malignancies.    Social History:  reports that  has never smoked. she has never used smokeless tobacco. She reports that she drinks about 0.6 oz of alcohol per week. She reports that she does not use drugs.    Review of patient's allergies indicates:  No Known Allergies    Medications:   Medications Prior to Admission   Medication Sig Dispense Refill Last Dose    citalopram (CELEXA) 10 MG tablet Take 1 tablet (10 mg total) by mouth once daily. 90 tablet 3 10/31/2018    citalopram (CELEXA) 20 MG tablet Take 1 tablet (20 mg total) by mouth once daily. 90 tablet 3        Physical Exam:    Vital Signs:   Vitals:    11/02/18 0851   BP: 123/79   Pulse: 82   Resp: 14   Temp: 97.7 °F (36.5 °C)       General Appearance: Well appearing in no acute distress  Abdomen: Soft, non tender, non distended with normal bowel sounds, no masses    Labs:  Lab Results   Component Value Date    WBC 6.19 03/16/2018    HGB 12.8 03/16/2018    HCT 38.3 03/16/2018     (H) 03/16/2018    CHOL 276  (A) 07/19/2018     03/16/2018    K 4.4 03/16/2018     03/16/2018    CREATININE 0.7 03/16/2018    BUN 13 03/16/2018    CO2 27 03/16/2018       I have explained the risks and benefits of this endoscopic procedure to the patient including but not limited to bleeding, inflammation, infection, perforation, and death.      Young Pink MD

## 2018-11-02 NOTE — ANESTHESIA PREPROCEDURE EVALUATION
11/02/2018  Harpreet Peralta is a 48 y.o., female.    Anesthesia Evaluation    I have reviewed the Patient Summary Reports.    I have reviewed the Nursing Notes.   I have reviewed the Medications.     Review of Systems  Hematology/Oncology:  Hematology Normal   Oncology Normal     EENT/Dental:EENT/Dental Normal   Cardiovascular:  Cardiovascular Normal     Pulmonary:  Pulmonary Normal    Renal/:  Renal/ Normal     Hepatic/GI:  Hepatic/GI Normal    Musculoskeletal:  Musculoskeletal Normal    Neurological:  Neurology Normal    Endocrine:  Endocrine Normal    Dermatological:  Skin Normal    Psych:  Psychiatric Normal           Physical Exam  General:  Well nourished    Airway/Jaw/Neck:  Airway Findings: Mouth Opening: Normal Tongue: Normal  General Airway Assessment: Adult  Mallampati: I  Improves to I with phonation.  TM Distance: Normal, at least 6 cm        Eyes/Ears/Nose:  EYES/EARS/NOSE FINDINGS: Normal   Dental:  DENTAL FINDINGS: Normal        Mental Status:  Mental Status Findings: Normal        Anesthesia Plan  Type of Anesthesia, risks & benefits discussed:  Anesthesia Type:  general  Patient's Preference: general  Intra-op Monitoring Plan: standard ASA monitors  Intra-op Monitoring Plan Comments:   Post Op Pain Control Plan: multimodal analgesia  Post Op Pain Control Plan Comments:   Induction:   IV  Beta Blocker:         Informed Consent: Patient understands risks and agrees with Anesthesia plan.  Questions answered. Anesthesia consent signed with patient.  ASA Score: 1     Day of Surgery Review of History & Physical: I have interviewed and examined the patient. I have reviewed the patient's H&P dated:  There are no significant changes. Significant changes noted: Surgeon notified.  H&P update referred to the provider.         Ready For Surgery From Anesthesia Perspective.

## 2018-11-02 NOTE — PROVATION PATIENT INSTRUCTIONS
Discharge Summary/Instructions after an Endoscopic Procedure  Patient Name: Harpreet Peralta  Patient MRN: [id number]  Patient YOB: 1970 Friday, November 02, 2018  Young Pink MD  RESTRICTIONS:  During your procedure today, you received medications for sedation.  These   medications may affect your judgment, balance and coordination.  Therefore,   for 24 hours, you have the following restrictions:   - DO NOT drive a car, operate machinery, make legal/financial decisions,   sign important papers or drink alcohol.    ACTIVITY:  Today: no heavy lifting, straining or running due to procedural   sedation/anesthesia.  The following day: return to full activity including work.  DIET:  Eat and drink normally unless instructed otherwise.     TREATMENT FOR COMMON SIDE EFFECTS:  - Mild abdominal pain, nausea, belching, bloating or excessive gas:  rest,   eat lightly and use a heating pad.  - Sore Throat: treat with throat lozenges and/or gargle with warm salt   water.  - Because air was used during the procedure, expelling large amounts of air   from your rectum or belching is normal.  - If a bowel prep was taken, you may not have a bowel movement for 1-3 days.    This is normal.  SYMPTOMS TO WATCH FOR AND REPORT TO YOUR PHYSICIAN:  1. Abdominal pain or bloating, other than gas cramps.  2. Chest pain.  3. Back pain.  4. Signs of infection such as: chills or fever occurring within 24 hours   after the procedure.  5. Rectal bleeding, which would show as bright red, maroon, or black stools.   (A tablespoon of blood from the rectum is not serious, especially if   hemorrhoids are present.)  6. Vomiting.  7. Weakness or dizziness.  GO DIRECTLY TO THE NEAREST EMERGENCY ROOM IF YOU HAVE ANY OF THE FOLLOWING:      Difficulty breathing              Chills and/or fever over 101 F   Persistent vomiting and/or vomiting blood   Severe abdominal pain   Severe chest pain   Black, tarry stools   Bleeding- more than one  tablespoon   Any other symptom or condition that you feel may need urgent attention  Your doctor recommends these additional instructions:  If any biopsies were taken, your doctors clinic will contact you in 1 to 2   weeks with any results.  - Discharge patient to home.   - Patient has a contact number available for emergencies.  The signs and   symptoms of potential delayed complications were discussed with the   patient.  Return to normal activities tomorrow.  Written discharge   instructions were provided to the patient.   - Resume previous diet.   - Continue present medications.   - Await pathology results.   - Repeat colonoscopy in 5 years for surveillance.   - Return to referring physician.   For questions, problems or results please call your physician - Young Pink MD at Work:  [phone].  [facility], EMERGENCY ROOM PHONE NUMBER: [phone]  IF A COMPLICATION OR EMERGENCY SITUATION ARISES AND YOU ARE UNABLE TO REACH   YOUR PHYSICIAN - GO DIRECTLY TO THE EMERGENCY ROOM.  Young Pink MD  11/2/2018 10:05:06 AM  This report has been verified and signed electronically.  [employer]

## 2018-11-02 NOTE — TRANSFER OF CARE
"Anesthesia Transfer of Care Note    Patient: Harpreet Peralta    Procedure(s) Performed: Procedure(s) (LRB):  COLONOSCOPY (N/A)    Patient location: PACU    Anesthesia Type: general    Transport from OR: Transported from OR on room air with adequate spontaneous ventilation    Post pain: adequate analgesia    Post assessment: no apparent anesthetic complications    Post vital signs: stable    Level of consciousness: awake    Nausea/Vomiting: no nausea/vomiting    Complications: none    Transfer of care protocol was followed      Last vitals:   Visit Vitals  /79 (BP Location: Left arm)   Pulse 82   Temp 36.5 °C (97.7 °F) (Temporal)   Resp 14   Ht 5' 11" (1.803 m)   Wt 95.3 kg (210 lb)   LMP 09/25/2018 (Approximate)   SpO2 96%   Breastfeeding? No   BMI 29.29 kg/m²     "

## 2018-11-02 NOTE — ANESTHESIA POSTPROCEDURE EVALUATION
"Anesthesia Post Evaluation    Patient: Harpreet Peralta    Procedure(s) Performed: Procedure(s) (LRB):  COLONOSCOPY (N/A)    Final Anesthesia Type: general  Patient location during evaluation: PACU  Patient participation: Yes- Able to Participate  Level of consciousness: awake and alert and oriented  Post-procedure vital signs: reviewed and stable  Pain management: adequate  Airway patency: patent  PONV status at discharge: No PONV  Anesthetic complications: no      Cardiovascular status: blood pressure returned to baseline  Respiratory status: unassisted  Hydration status: euvolemic  Follow-up not needed.        Visit Vitals  /73 (BP Location: Left arm, Patient Position: Lying)   Pulse 63   Temp 36.7 °C (98 °F) (Temporal)   Resp 18   Ht 5' 11" (1.803 m)   Wt 95.3 kg (210 lb)   LMP 09/25/2018 (Approximate)   SpO2 99%   Breastfeeding? No   BMI 29.29 kg/m²       Pain/Cliff Score: Pain Assessment Performed: Yes (11/2/2018 10:44 AM)  Presence of Pain: denies (11/2/2018 10:44 AM)  Cliff Score: 10 (11/2/2018 10:12 AM)        "

## 2018-11-09 ENCOUNTER — TELEPHONE (OUTPATIENT)
Dept: ENDOSCOPY | Facility: HOSPITAL | Age: 48
End: 2018-11-09

## 2018-12-18 ENCOUNTER — OFFICE VISIT (OUTPATIENT)
Dept: INTERNAL MEDICINE | Facility: CLINIC | Age: 48
End: 2018-12-18
Payer: COMMERCIAL

## 2018-12-18 VITALS
HEIGHT: 71 IN | OXYGEN SATURATION: 98 % | DIASTOLIC BLOOD PRESSURE: 68 MMHG | BODY MASS INDEX: 30.27 KG/M2 | WEIGHT: 216.25 LBS | SYSTOLIC BLOOD PRESSURE: 110 MMHG | HEART RATE: 70 BPM

## 2018-12-18 DIAGNOSIS — M79.606 PAIN OF LOWER EXTREMITY, UNSPECIFIED LATERALITY: Primary | ICD-10-CM

## 2018-12-18 PROCEDURE — 99213 OFFICE O/P EST LOW 20 MIN: CPT | Mod: S$GLB,,, | Performed by: INTERNAL MEDICINE

## 2018-12-18 PROCEDURE — 3008F BODY MASS INDEX DOCD: CPT | Mod: CPTII,S$GLB,, | Performed by: INTERNAL MEDICINE

## 2018-12-18 PROCEDURE — 99999 PR PBB SHADOW E&M-EST. PATIENT-LVL IV: CPT | Mod: PBBFAC,,, | Performed by: INTERNAL MEDICINE
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
## 2019-05-20 ENCOUNTER — HOSPITAL ENCOUNTER (OUTPATIENT)
Dept: RADIOLOGY | Facility: HOSPITAL | Age: 49
Discharge: HOME | End: 2019-05-20
Attending: OBSTETRICS & GYNECOLOGY
Payer: COMMERCIAL

## 2019-05-20 DIAGNOSIS — Z12.31 VISIT FOR SCREENING MAMMOGRAM: ICD-10-CM

## 2019-05-20 PROCEDURE — 77067 SCR MAMMO BI INCL CAD: CPT | Mod: TC

## 2019-05-20 PROCEDURE — 77067 SCR MAMMO BI INCL CAD: CPT | Mod: 26,,, | Performed by: RADIOLOGY

## 2019-05-20 PROCEDURE — 77067 MAMMO DIGITAL SCREENING BILAT WITH TOMOSYNTHESIS_CAD: ICD-10-PCS | Mod: 26,,, | Performed by: RADIOLOGY

## 2019-05-20 PROCEDURE — 77063 MAMMO DIGITAL SCREENING BILAT WITH TOMOSYNTHESIS_CAD: ICD-10-PCS | Mod: 26,,, | Performed by: RADIOLOGY

## 2019-05-20 PROCEDURE — 77063 BREAST TOMOSYNTHESIS BI: CPT | Mod: 26,,, | Performed by: RADIOLOGY

## 2019-06-03 ENCOUNTER — OFFICE VISIT (OUTPATIENT)
Dept: OBSTETRICS AND GYNECOLOGY | Facility: CLINIC | Age: 49
End: 2019-06-03
Payer: COMMERCIAL

## 2019-06-03 VITALS
DIASTOLIC BLOOD PRESSURE: 80 MMHG | SYSTOLIC BLOOD PRESSURE: 110 MMHG | WEIGHT: 212 LBS | HEIGHT: 71 IN | BODY MASS INDEX: 29.68 KG/M2

## 2019-06-03 DIAGNOSIS — Z01.419 ENCOUNTER FOR GYNECOLOGICAL EXAMINATION WITHOUT ABNORMAL FINDING: Primary | ICD-10-CM

## 2019-06-03 DIAGNOSIS — N95.1 PERIMENOPAUSAL: ICD-10-CM

## 2019-06-03 DIAGNOSIS — Z12.31 VISIT FOR SCREENING MAMMOGRAM: ICD-10-CM

## 2019-06-03 DIAGNOSIS — F41.1 GAD (GENERALIZED ANXIETY DISORDER): ICD-10-CM

## 2019-06-03 PROCEDURE — 99999 PR PBB SHADOW E&M-EST. PATIENT-LVL III: CPT | Mod: PBBFAC,,, | Performed by: OBSTETRICS & GYNECOLOGY

## 2019-06-03 PROCEDURE — 99396 PREV VISIT EST AGE 40-64: CPT | Mod: S$GLB,,, | Performed by: OBSTETRICS & GYNECOLOGY

## 2019-06-03 PROCEDURE — 99396 PR PREVENTIVE VISIT,EST,40-64: ICD-10-PCS | Mod: S$GLB,,, | Performed by: OBSTETRICS & GYNECOLOGY

## 2019-06-03 PROCEDURE — 99999 PR PBB SHADOW E&M-EST. PATIENT-LVL III: ICD-10-PCS | Mod: PBBFAC,,, | Performed by: OBSTETRICS & GYNECOLOGY

## 2019-06-03 NOTE — PROGRESS NOTES
HISTORY OF PRESENT ILLNESS:    Harpreet Peralta is a 49 y.o. female, , Patient's last menstrual period was 2019.,  presents for a routine exam and has no complaints.    Past Medical History:   Diagnosis Date    Anxiety     Depression     Urinary tract infection 2013    Staphylococcus saprophyticus       Past Surgical History:   Procedure Laterality Date    COLONOSCOPY N/A 2018    Performed by Young Pink MD at [facility] ([address] [place] [address])       MEDICATIONS AND ALLERGIES:      Current Outpatient Medications:     citalopram (CELEXA) 10 MG tablet, Take 1 tablet (10 mg total) by mouth once daily., Disp: 90 tablet, Rfl: 3    citalopram (CELEXA) 20 MG tablet, Take 1 tablet (20 mg total) by mouth once daily., Disp: 90 tablet, Rfl: 3    Review of patient's allergies indicates:  No Known Allergies    Family History   Problem Relation Age of Onset    Breast cancer Maternal Uncle 60    Breast cancer Paternal Aunt 70       Social History     Socioeconomic History    Marital status:      Spouse name: Not on file    Number of children: Not on file    Years of education: Not on file    Highest education level: Not on file   Occupational History    Not on file   Social Needs    Financial resource strain: Not on file    Food insecurity:     Worry: Not on file     Inability: Not on file    Transportation needs:     Medical: Not on file     Non-medical: Not on file   Tobacco Use    Smoking status: Never Smoker    Smokeless tobacco: Never Used   Substance and Sexual Activity    Alcohol use: Yes     Alcohol/week: 0.6 oz     Types: 1 Glasses of wine per week     Comment: socially    Drug use: No    Sexual activity: Yes     Partners: Male   Lifestyle    Physical activity:     Days per week: Not on file     Minutes per session: Not on file    Stress: Not on file   Relationships    Social connections:     Talks on phone: Not on file     Gets together: Not on file     Attends Nondenominational service:  "Not on file     Active member of club or organization: Not on file     Attends meetings of clubs or organizations: Not on file     Relationship status: Not on file   Other Topics Concern    Not on file   Social History Narrative    2018    Newly wed in 2016    Gardens and cook together    In banking    [employer]    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  Infection History: Denies STDs. Denies PID.  Benign History: Denies uterine fibroids. Denies ovarian cysts. Denies endometriosis. Denies other conditions.  Cancer History: Denies cervical cancer. Denies uterine cancer or hyperplasia. Denies ovarian cancer. Denies vulvar cancer or pre-cancer. Denies vaginal cancer or pre-cancer. Denies breast cancer. Denies colon cancer.  Sexual Activity History: Reports currently being sexually active  Menstrual History: Monthly. Mod then light flow.   Dysmenorrhea History: Reports mild dysmenorrhea.       ROS:  GENERAL: No weight changes. No swelling. No fatigue. No fever.  CARDIOVASCULAR: No chest pain. No shortness of breath. No leg cramps.   NEUROLOGICAL: No headaches. No vision changes.  BREASTS: No pain. No lumps. No discharge.  ABDOMEN: No pain. No nausea. No vomiting. No diarrhea. No constipation.  REPRODUCTIVE: No abnormal bleeding.   VULVA: No pain. No lesions. No itching.  VAGINA: No relaxation. No itching. No odor. No discharge. No lesions.  URINARY: No incontinence. No nocturia. No frequency. No dysuria.    /80   Ht 5' 11" (1.803 m)   Wt 96.2 kg (212 lb)   LMP 05/03/2019   BMI 29.57 kg/m²     PE:  APPEARANCE: Well nourished, well developed, in no acute distress.  AFFECT: WNL, alert and oriented x 3.  SKIN: No acne or hirsutism.  NECK: Neck symmetric, without masses or thyromegaly.  NODES: No inguinal, cervical, axillary or femoral lymph node enlargement.  CHEST: Good respiratory " effort.   ABDOMEN: Soft. No tenderness or masses. No hepatosplenomegaly. No hernias.  BREASTS: Symmetrical, no skin changes, visible lesions, palpable masses or nipple discharge bilaterally.  PELVIC: External female genitalia without lesions.  Female hair distribution. Adequate perineal body, Normal urethral meatus. Vagina moist and well rugated without lesions or discharge.  No significant cystocele or rectocele present. Cervix pink without lesions, discharge or tenderness. Uterus is 4-6 week size, regular, mobile and nontender. Adnexa without masses or tenderness.  EXTREMITIES: No edema    DIAGNOSIS:  1. Encounter for gynecological examination without abnormal finding    2. Visit for screening mammogram    3. SANDHYA (generalized anxiety disorder)    4. Perimenopausal        PLAN:    Orders Placed This Encounter    Mammo Digital Screening Bilat       COUNSELING:  The patient was counseled today on:  -A.C.S. Pap and pelvic exam guidelines (pap every 3 years), recomendations for yearly mammogram;  -to follow up with her PCP for other health maintenance.    FOLLOW-UP with me annually.

## 2019-06-18 ENCOUNTER — PATIENT MESSAGE (OUTPATIENT)
Dept: INTERNAL MEDICINE | Facility: CLINIC | Age: 49
End: 2019-06-18

## 2019-07-17 ENCOUNTER — PATIENT MESSAGE (OUTPATIENT)
Dept: INTERNAL MEDICINE | Facility: CLINIC | Age: 49
End: 2019-07-17

## 2019-07-17 ENCOUNTER — CLINICAL SUPPORT (OUTPATIENT)
Dept: OTHER | Facility: CLINIC | Age: 49
End: 2019-07-17
Payer: COMMERCIAL

## 2019-07-17 DIAGNOSIS — R73.9 HYPERGLYCEMIA: ICD-10-CM

## 2019-07-17 DIAGNOSIS — N20.0 KIDNEY STONES: Primary | ICD-10-CM

## 2019-07-17 DIAGNOSIS — E78.2 MIXED HYPERLIPIDEMIA: ICD-10-CM

## 2019-07-17 DIAGNOSIS — E83.52 HYPERCALCEMIA: ICD-10-CM

## 2019-07-17 DIAGNOSIS — Z00.8 ENCOUNTER FOR OTHER GENERAL EXAMINATION: ICD-10-CM

## 2019-07-17 PROCEDURE — 82947 ASSAY GLUCOSE BLOOD QUANT: CPT | Mod: QW,S$GLB,, | Performed by: INTERNAL MEDICINE

## 2019-07-17 PROCEDURE — 99401 PR PREVENT COUNSEL,INDIV,15 MIN: ICD-10-PCS | Mod: S$GLB,,, | Performed by: INTERNAL MEDICINE

## 2019-07-17 PROCEDURE — 82947 PR  ASSAY QUANTITATIVE,BLOOD GLUCOSE: ICD-10-PCS | Mod: QW,S$GLB,, | Performed by: INTERNAL MEDICINE

## 2019-07-17 PROCEDURE — 80061 LIPID PANEL: CPT | Mod: QW,S$GLB,, | Performed by: INTERNAL MEDICINE

## 2019-07-17 PROCEDURE — 99401 PREV MED CNSL INDIV APPRX 15: CPT | Mod: S$GLB,,, | Performed by: INTERNAL MEDICINE

## 2019-07-17 PROCEDURE — 80061 PR  LIPID PANEL: ICD-10-PCS | Mod: QW,S$GLB,, | Performed by: INTERNAL MEDICINE

## 2019-07-17 NOTE — TELEPHONE ENCOUNTER
From: Harpreet Peralta     Sent: 7/17/2019  2:53 PM CDT       To: Katelin Guerra MD  Subject: Test Results    I attended biometric screening at work today and the results are not good.  The nurse recommended I sent the results to you as you may want to order additional tests for me in September based off of my glucose number for A1c levels.    Please advise

## 2019-07-18 VITALS — HEIGHT: 71 IN | BODY MASS INDEX: 29.57 KG/M2

## 2019-07-18 PROBLEM — R73.9 HYPERGLYCEMIA: Status: ACTIVE | Noted: 2019-07-18

## 2019-07-18 PROBLEM — E78.2 MIXED HYPERLIPIDEMIA: Status: ACTIVE | Noted: 2019-07-18

## 2019-07-18 PROBLEM — E83.52 HYPERCALCEMIA: Status: ACTIVE | Noted: 2019-07-18

## 2019-07-18 LAB
HDLC SERPL-MCNC: 61 MG/DL
POC CHOLESTEROL, LDL (DOCK): 211 MG/DL
POC CHOLESTEROL, TOTAL: 320 MG/DL
POC GLUCOSE, FASTING: 105 MG/DL (ref 60–110)
TRIGL SERPL-MCNC: 237 MG/DL

## 2019-07-18 NOTE — PROGRESS NOTES
Biometrics completed.    Results reviewed with a Registered Nurse; understanding of results and   educational materials was verbalized.   Chief Complaint   Patient presents with    Red Eye    URI     she is a 52y.o. year old female who presents for evalution. Pt woke up Sunday and eye was very red, crusty. Has been using OTC pink eye medication which helps slightly but continues to be crusty and now other eye has started draining with crust.      Pt has also been having URI for past 2 weeks. Sore throat, congestion, productive cough. Feels like things have been improving. Reviewed PmHx, RxHx, FmHx, SocHx, AllgHx and updated and dated in the chart. Review of Systems - negative except as listed above in the HPI    Objective:     Vitals:    01/26/17 1548   BP: 137/72   Pulse: 80   Resp: 12   Temp: 98.9 °F (37.2 °C)   SpO2: 100%   Weight: 154 lb (69.9 kg)   Height: 5' 1\" (1.549 m)     Physical Examination: General appearance - alert, well appearing, and in no distress  Eyes - conjunctivitis noted left, slight R  Ears - bilateral TM's and external ear canals normal  Nose - mucosal congestion, mucosal erythema and sinus tenderness noted left maxillary   Mouth - mucous membranes moist, pharynx normal without lesions and erythematous  Neck - supple, no significant adenopathy  Chest - clear to auscultation, no wheezes, rales or rhonchi, symmetric air entry  Heart - normal rate, regular rhythm, normal S1, S2, no murmurs, rubs, clicks or gallops    Assessment/ Plan:   Briseida Whitt was seen today for red eye and uri. Diagnoses and all orders for this visit:    Acute non-recurrent maxillary sinusitis  -     cephALEXin (KEFLEX) 500 mg capsule; Take 1 Cap by mouth two (2) times a day for 10 days. Discussed and encouraged rest and clear fluids, if congestion is thick should avoid dairy. Recommended hot showers with steam and elevating head of bed. May use Vitamin C or Echinacea in addition to current therapy.       Acute conjunctivitis of both eyes, unspecified acute conjunctivitis type  -     trimethoprim-polymyxin b (POLYTRIM) ophthalmic solution; Administer 1 Drop to both eyes every four (4) hours for 10 days. New rx. Disinfect surfaces after 24 hrs on drops. F/U prn    Cough  -     benzonatate (TESSALON) 200 mg capsule; Take 1 Cap by mouth three (3) times daily as needed for Cough. Pt voiced understanding regarding plan of care. Follow-up Disposition:  Return if symptoms worsen or fail to improve. I have discussed the diagnosis with the patient and the intended plan as seen in the above orders. The patient has received an after-visit summary and questions were answered concerning future plans.      Medication Side Effects and Warnings were discussed with patient    Sonia Gilliland NP

## 2019-07-19 ENCOUNTER — LAB VISIT (OUTPATIENT)
Dept: LAB | Facility: HOSPITAL | Age: 49
End: 2019-07-19
Attending: INTERNAL MEDICINE
Payer: COMMERCIAL

## 2019-07-19 DIAGNOSIS — E78.2 MIXED HYPERLIPIDEMIA: ICD-10-CM

## 2019-07-19 DIAGNOSIS — R73.9 HYPERGLYCEMIA: ICD-10-CM

## 2019-07-19 DIAGNOSIS — E83.52 HYPERCALCEMIA: ICD-10-CM

## 2019-07-19 DIAGNOSIS — N20.0 KIDNEY STONES: ICD-10-CM

## 2019-07-19 LAB
ALBUMIN SERPL BCP-MCNC: 4.2 G/DL (ref 3.5–5.2)
ALP SERPL-CCNC: 94 U/L (ref 55–135)
ALT SERPL W/O P-5'-P-CCNC: 58 U/L (ref 10–44)
ANION GAP SERPL CALC-SCNC: 11 MMOL/L (ref 8–16)
AST SERPL-CCNC: 27 U/L (ref 10–40)
BASOPHILS # BLD AUTO: 0.04 K/UL (ref 0–0.2)
BASOPHILS NFR BLD: 0.8 % (ref 0–1.9)
BILIRUB SERPL-MCNC: 0.7 MG/DL (ref 0.1–1)
BUN SERPL-MCNC: 17 MG/DL (ref 6–20)
CALCIUM SERPL-MCNC: 10.1 MG/DL (ref 8.7–10.5)
CHLORIDE SERPL-SCNC: 102 MMOL/L (ref 95–110)
CHOLEST SERPL-MCNC: 275 MG/DL (ref 120–199)
CHOLEST/HDLC SERPL: 5.1 {RATIO} (ref 2–5)
CO2 SERPL-SCNC: 26 MMOL/L (ref 23–29)
CREAT SERPL-MCNC: 0.8 MG/DL (ref 0.5–1.4)
DIFFERENTIAL METHOD: ABNORMAL
EOSINOPHIL # BLD AUTO: 0.1 K/UL (ref 0–0.5)
EOSINOPHIL NFR BLD: 2.3 % (ref 0–8)
ERYTHROCYTE [DISTWIDTH] IN BLOOD BY AUTOMATED COUNT: 11.5 % (ref 11.5–14.5)
EST. GFR  (AFRICAN AMERICAN): >60 ML/MIN/1.73 M^2
EST. GFR  (NON AFRICAN AMERICAN): >60 ML/MIN/1.73 M^2
ESTIMATED AVG GLUCOSE: 103 MG/DL (ref 68–131)
GLUCOSE SERPL-MCNC: 106 MG/DL (ref 70–110)
HBA1C MFR BLD HPLC: 5.2 % (ref 4–5.6)
HCT VFR BLD AUTO: 39.8 % (ref 37–48.5)
HDLC SERPL-MCNC: 54 MG/DL (ref 40–75)
HDLC SERPL: 19.6 % (ref 20–50)
HGB BLD-MCNC: 13.3 G/DL (ref 12–16)
LDLC SERPL CALC-MCNC: 187.2 MG/DL (ref 63–159)
LYMPHOCYTES # BLD AUTO: 1.4 K/UL (ref 1–4.8)
LYMPHOCYTES NFR BLD: 28.9 % (ref 18–48)
MCH RBC QN AUTO: 33.3 PG (ref 27–31)
MCHC RBC AUTO-ENTMCNC: 33.4 G/DL (ref 32–36)
MCV RBC AUTO: 100 FL (ref 82–98)
MONOCYTES # BLD AUTO: 0.4 K/UL (ref 0.3–1)
MONOCYTES NFR BLD: 9.1 % (ref 4–15)
NEUTROPHILS # BLD AUTO: 2.8 K/UL (ref 1.8–7.7)
NEUTROPHILS NFR BLD: 58.9 % (ref 38–73)
NONHDLC SERPL-MCNC: 221 MG/DL
PLATELET # BLD AUTO: 325 K/UL (ref 150–350)
PMV BLD AUTO: 9.4 FL (ref 9.2–12.9)
POTASSIUM SERPL-SCNC: 4.3 MMOL/L (ref 3.5–5.1)
PROT SERPL-MCNC: 7.9 G/DL (ref 6–8.4)
PTH-INTACT SERPL-MCNC: 68 PG/ML (ref 9–77)
RBC # BLD AUTO: 3.99 M/UL (ref 4–5.4)
SODIUM SERPL-SCNC: 139 MMOL/L (ref 136–145)
TRIGL SERPL-MCNC: 169 MG/DL (ref 30–150)
TSH SERPL DL<=0.005 MIU/L-ACNC: 2.6 UIU/ML (ref 0.4–4)
URATE SERPL-MCNC: 7.2 MG/DL (ref 2.4–5.7)
WBC # BLD AUTO: 4.84 K/UL (ref 3.9–12.7)

## 2019-07-19 PROCEDURE — 80053 COMPREHEN METABOLIC PANEL: CPT

## 2019-07-19 PROCEDURE — 83036 HEMOGLOBIN GLYCOSYLATED A1C: CPT

## 2019-07-19 PROCEDURE — 84443 ASSAY THYROID STIM HORMONE: CPT

## 2019-07-19 PROCEDURE — 80061 LIPID PANEL: CPT

## 2019-07-19 PROCEDURE — 85025 COMPLETE CBC W/AUTO DIFF WBC: CPT

## 2019-07-19 PROCEDURE — 84550 ASSAY OF BLOOD/URIC ACID: CPT

## 2019-07-19 PROCEDURE — 83970 ASSAY OF PARATHORMONE: CPT

## 2019-07-19 PROCEDURE — 36415 COLL VENOUS BLD VENIPUNCTURE: CPT

## 2019-09-10 RX ORDER — CITALOPRAM 20 MG/1
TABLET, FILM COATED ORAL
Qty: 90 TABLET | Refills: 3 | Status: SHIPPED | OUTPATIENT
Start: 2019-09-10 | End: 2019-09-17

## 2019-09-10 RX ORDER — CITALOPRAM 10 MG/1
TABLET ORAL
Qty: 90 TABLET | Refills: 3 | Status: SHIPPED | OUTPATIENT
Start: 2019-09-10 | End: 2019-09-17 | Stop reason: SDUPTHER

## 2019-09-17 ENCOUNTER — OFFICE VISIT (OUTPATIENT)
Dept: INTERNAL MEDICINE | Facility: CLINIC | Age: 49
End: 2019-09-17
Payer: COMMERCIAL

## 2019-09-17 VITALS
HEIGHT: 71 IN | HEART RATE: 63 BPM | OXYGEN SATURATION: 98 % | SYSTOLIC BLOOD PRESSURE: 124 MMHG | WEIGHT: 206.56 LBS | DIASTOLIC BLOOD PRESSURE: 68 MMHG | BODY MASS INDEX: 28.92 KG/M2

## 2019-09-17 DIAGNOSIS — E78.2 MIXED HYPERLIPIDEMIA: ICD-10-CM

## 2019-09-17 DIAGNOSIS — Z00.00 ANNUAL PHYSICAL EXAM: Primary | ICD-10-CM

## 2019-09-17 DIAGNOSIS — R71.8 ELEVATED MCV: ICD-10-CM

## 2019-09-17 DIAGNOSIS — R74.01 ELEVATED ALT MEASUREMENT: ICD-10-CM

## 2019-09-17 DIAGNOSIS — F41.1 GAD (GENERALIZED ANXIETY DISORDER): ICD-10-CM

## 2019-09-17 PROBLEM — K59.09 CHRONIC CONSTIPATION: Status: RESOLVED | Noted: 2018-03-21 | Resolved: 2019-09-17

## 2019-09-17 PROCEDURE — 99396 PR PREVENTIVE VISIT,EST,40-64: ICD-10-PCS | Mod: S$GLB,,, | Performed by: INTERNAL MEDICINE

## 2019-09-17 PROCEDURE — 99396 PREV VISIT EST AGE 40-64: CPT | Mod: S$GLB,,, | Performed by: INTERNAL MEDICINE

## 2019-09-17 PROCEDURE — 99999 PR PBB SHADOW E&M-EST. PATIENT-LVL III: CPT | Mod: PBBFAC,,, | Performed by: INTERNAL MEDICINE

## 2019-09-17 PROCEDURE — 99999 PR PBB SHADOW E&M-EST. PATIENT-LVL III: ICD-10-PCS | Mod: PBBFAC,,, | Performed by: INTERNAL MEDICINE

## 2019-09-17 RX ORDER — CITALOPRAM 10 MG/1
10 TABLET ORAL DAILY
Qty: 90 TABLET | Refills: 3 | Status: SHIPPED | OUTPATIENT
Start: 2019-09-17 | End: 2020-07-30 | Stop reason: ALTCHOICE

## 2019-09-17 RX ORDER — CITALOPRAM 20 MG/1
20 TABLET, FILM COATED ORAL DAILY
Qty: 90 TABLET | Refills: 3 | Status: SHIPPED | OUTPATIENT
Start: 2019-09-17 | End: 2020-07-30 | Stop reason: ALTCHOICE

## 2019-09-18 ENCOUNTER — PATIENT MESSAGE (OUTPATIENT)
Dept: INTERNAL MEDICINE | Facility: CLINIC | Age: 49
End: 2019-09-18

## 2019-09-18 RX ORDER — NITROFURANTOIN (MACROCRYSTALS) 100 MG/1
100 CAPSULE ORAL EVERY 6 HOURS
Qty: 28 CAPSULE | Refills: 0 | Status: SHIPPED | OUTPATIENT
Start: 2019-09-18 | End: 2019-09-18 | Stop reason: SDUPTHER

## 2019-09-18 RX ORDER — NITROFURANTOIN (MACROCRYSTALS) 100 MG/1
100 CAPSULE ORAL EVERY 6 HOURS
Qty: 28 CAPSULE | Refills: 0 | Status: SHIPPED | OUTPATIENT
Start: 2019-09-18 | End: 2019-09-30 | Stop reason: SDUPTHER

## 2019-09-18 NOTE — TELEPHONE ENCOUNTER
From: Harpreet Peralta     Sent: 9/18/2019 10:21 AM CDT       To: Katelin Guerar MD  Subject: Non-Urgent Medical    I am feeling some pain urinating so I took the over-the-counter AZO UTI test and it is positive  (Leukocytes).    The pain has just started, but its getting worse.  Is there anyway you can prescribe something for me please?  I am at work so if you could send it to the [facility] and [facility].    Address: [address] [place] [id number]  Phone: [phone]    Please advise

## 2019-09-18 NOTE — PROGRESS NOTES
Subjective:       Patient ID: Harpreet Peralta is a 49 y.o. female.    Chief Complaint: Annual Exam   She presents for routine annual physical    Her parents are both 76 years old enjoyed good    She lives with her  and her 2 step sons ages 17 senior in high school of on a ball and a 20-year-old who is working.  She has been drinking too much alcohol and eating too much carbohydrate and she has stopped drinking 6 weeks ago because of the MCV of 100 increase liver function test total cholesterol 275 LDL of 187 triglycerides 169    She feels fine.    She has trouble with anxiety and she would like to continue taking citalopram at 30 mg once daily  She does not wish to decrease the dose to 20 mg a day  She does not wish to increase the dose to 40 mg a day  She needs 2 prescriptions because they would not fill 1 and half tablet using a 20 mg tablet  HPI  Review of Systems   Constitutional: Negative for activity change and unexpected weight change.   HENT: Negative for hearing loss, rhinorrhea and trouble swallowing.    Eyes: Negative for discharge and visual disturbance.   Respiratory: Negative for chest tightness and wheezing.    Cardiovascular: Negative for chest pain and palpitations.   Gastrointestinal: Negative for blood in stool, constipation, diarrhea and vomiting.   Endocrine: Negative for polydipsia and polyuria.   Genitourinary: Negative for difficulty urinating, dysuria, hematuria and menstrual problem.   Musculoskeletal: Negative for arthralgias, joint swelling and neck pain.   Neurological: Negative for weakness and headaches.   Psychiatric/Behavioral: Negative for confusion and dysphoric mood.       Objective:      Physical Exam   Constitutional: She is oriented to person, place, and time. She appears well-developed and well-nourished. No distress.   HENT:   Head: Normocephalic and atraumatic.   Right Ear: External ear normal.   Left Ear: External ear normal.   Nose: Nose normal.   Mouth/Throat:  Oropharynx is clear and moist. No oropharyngeal exudate.   Eyes: Pupils are equal, round, and reactive to light. Conjunctivae and EOM are normal. Right eye exhibits no discharge. No scleral icterus.   Neck: Normal range of motion and full passive range of motion without pain. Neck supple. No spinous process tenderness and no muscular tenderness present. Carotid bruit is not present. No thyromegaly present.   Cardiovascular: Normal rate, regular rhythm, S1 normal, S2 normal, normal heart sounds and intact distal pulses. Exam reveals no gallop and no friction rub.   No murmur heard.  Pulmonary/Chest: Effort normal and breath sounds normal. No respiratory distress. She has no wheezes. She has no rales. She exhibits no tenderness.   Abdominal: Soft. Bowel sounds are normal. She exhibits no distension and no mass. There is no tenderness. There is no rebound and no guarding. No hernia.   Genitourinary: Pelvic exam was performed with patient supine. Uterus is not deviated, not enlarged, not fixed and not tender. Cervix exhibits no motion tenderness, no discharge and no friability. Right adnexum displays no mass, no tenderness and no fullness. Left adnexum displays no mass, no tenderness and no fullness.   Musculoskeletal: Normal range of motion. She exhibits no edema or tenderness.   Lymphadenopathy:        Head (right side): No submental and no submandibular adenopathy present.        Head (left side): No submental and no submandibular adenopathy present.     She has no cervical adenopathy.        Right cervical: No superficial cervical, no deep cervical and no posterior cervical adenopathy present.       Left cervical: No superficial cervical, no deep cervical and no posterior cervical adenopathy present.        Right axillary: No pectoral and no lateral adenopathy present.        Left axillary: No pectoral and no lateral adenopathy present.       Right: No supraclavicular adenopathy present.        Left: No  supraclavicular adenopathy present.   Neurological: She is alert and oriented to person, place, and time. She has normal reflexes. No cranial nerve deficit. She exhibits normal muscle tone. Coordination normal.   Skin: Skin is warm and dry. No rash noted.   Psychiatric: She has a normal mood and affect. Her behavior is normal. Her mood appears not anxious. Her speech is not rapid and/or pressured. She is not agitated. She does not exhibit a depressed mood.   Normal behavior, thought content, insight and judgement.   Nursing note and vitals reviewed.      Assessment:       1. Annual physical exam    2. Elevated MCV    3. Elevated ALT measurement    4. Mixed hyperlipidemia    5. SANDHYA (generalized anxiety disorder)        Plan:   Harpreet was seen today for annual exam.    Diagnoses and all orders for this visit:    Annual physical exam    Elevated MCV  -     CBC auto differential; Future    Elevated ALT measurement  -     Comprehensive metabolic panel; Future    Mixed hyperlipidemia  -     Lipid panel; Future    SANDHYA (generalized anxiety disorder)    Other orders  -     citalopram (CELEXA) 10 MG tablet; Take 1 tablet (10 mg total) by mouth once daily.  -     citalopram (CELEXA) 20 MG tablet; Take 1 tablet (20 mg total) by mouth once daily.     We will check repeat labs in December  Follow up in about 1 year (around 9/17/2020) for for physical.

## 2019-09-29 ENCOUNTER — PATIENT MESSAGE (OUTPATIENT)
Dept: INTERNAL MEDICINE | Facility: CLINIC | Age: 49
End: 2019-09-29

## 2019-09-30 ENCOUNTER — TELEPHONE (OUTPATIENT)
Dept: INTERNAL MEDICINE | Facility: CLINIC | Age: 49
End: 2019-09-30

## 2019-09-30 RX ORDER — NITROFURANTOIN (MACROCRYSTALS) 100 MG/1
100 CAPSULE ORAL EVERY 6 HOURS
Qty: 28 CAPSULE | Refills: 0 | Status: SHIPPED | OUTPATIENT
Start: 2019-09-30 | End: 2020-06-04

## 2019-09-30 NOTE — TELEPHONE ENCOUNTER
Sent: 9/29/2019 10:01 AM CDT       To: Katelin Guerra MD  Subject: Non-Urgent Medical    Completed the meds for UTI and I still have some infection.  I took a follow up AZO test.  This not unusual for me to need a second round (see history from a couple of years ago) If I need to provide a urine sample, I can come in Monday.  If not, please call in a refill for me.  Thanks.      Please advise

## 2019-10-17 ENCOUNTER — PATIENT MESSAGE (OUTPATIENT)
Dept: INTERNAL MEDICINE | Facility: CLINIC | Age: 49
End: 2019-10-17

## 2019-10-17 DIAGNOSIS — N39.0 URINARY TRACT INFECTION WITHOUT HEMATURIA, SITE UNSPECIFIED: Primary | ICD-10-CM

## 2019-10-17 NOTE — TELEPHONE ENCOUNTER
From: Harpreet Peralta     Sent: 10/17/2019  7:53 AM CDT       To: Katelin Guerra MD  Subject: Non-Urgent Medical    I am still feeling like I may have a UTI issue and the AZO test stick still shows something as well.  I do not feel pain, but I feel the urgency if I move around too much.  Is it possible for me to come to the lab for a urine sample or something Friday? or whatever you think  I need to do at this point?    Please advise

## 2019-10-17 NOTE — TELEPHONE ENCOUNTER
So,  Future home collect U/A and urine Culture   is ordered, could you call  Her    to be sure she is not taking an antibiotic that would mess up the urine culture and also so she knows what to do and where to come?

## 2019-10-18 ENCOUNTER — TELEPHONE (OUTPATIENT)
Dept: INTERNAL MEDICINE | Facility: CLINIC | Age: 49
End: 2019-10-18

## 2019-10-18 ENCOUNTER — PATIENT MESSAGE (OUTPATIENT)
Dept: INTERNAL MEDICINE | Facility: CLINIC | Age: 49
End: 2019-10-18

## 2019-10-18 ENCOUNTER — LAB VISIT (OUTPATIENT)
Dept: LAB | Facility: HOSPITAL | Age: 49
End: 2019-10-18
Attending: INTERNAL MEDICINE
Payer: COMMERCIAL

## 2019-10-18 DIAGNOSIS — N39.0 URINARY TRACT INFECTION WITHOUT HEMATURIA, SITE UNSPECIFIED: ICD-10-CM

## 2019-10-18 LAB
BILIRUB UR QL STRIP: NEGATIVE
CLARITY UR REFRACT.AUTO: CLEAR
COLOR UR AUTO: YELLOW
GLUCOSE UR QL STRIP: NEGATIVE
HGB UR QL STRIP: NEGATIVE
KETONES UR QL STRIP: NEGATIVE
LEUKOCYTE ESTERASE UR QL STRIP: NEGATIVE
NITRITE UR QL STRIP: NEGATIVE
PH UR STRIP: 7 [PH] (ref 5–8)
PROT UR QL STRIP: NEGATIVE
SP GR UR STRIP: 1.02 (ref 1–1.03)
URN SPEC COLLECT METH UR: NORMAL

## 2019-10-18 PROCEDURE — 81003 URINALYSIS AUTO W/O SCOPE: CPT

## 2019-10-18 PROCEDURE — 87086 URINE CULTURE/COLONY COUNT: CPT

## 2019-10-18 NOTE — TELEPHONE ENCOUNTER
Sent: 10/18/2019  2:58 PM CDT       To: Katelin Guerra MD  Subject: Non-Urgent Medical    Dropped off urine sample this morning at [facility].  Any response yet?      Please advise

## 2019-10-19 LAB — BACTERIA UR CULT: NORMAL

## 2019-12-17 ENCOUNTER — PATIENT MESSAGE (OUTPATIENT)
Dept: INTERNAL MEDICINE | Facility: CLINIC | Age: 49
End: 2019-12-17

## 2020-01-29 ENCOUNTER — LAB VISIT (OUTPATIENT)
Dept: LAB | Facility: HOSPITAL | Age: 50
End: 2020-01-29
Attending: INTERNAL MEDICINE
Payer: COMMERCIAL

## 2020-01-29 DIAGNOSIS — R71.8 ELEVATED MCV: ICD-10-CM

## 2020-01-29 DIAGNOSIS — E78.2 MIXED HYPERLIPIDEMIA: ICD-10-CM

## 2020-01-29 DIAGNOSIS — R74.01 ELEVATED ALT MEASUREMENT: ICD-10-CM

## 2020-01-29 LAB
ALBUMIN SERPL BCP-MCNC: 3.9 G/DL (ref 3.5–5.2)
ALP SERPL-CCNC: 69 U/L (ref 55–135)
ALT SERPL W/O P-5'-P-CCNC: 34 U/L (ref 10–44)
ANION GAP SERPL CALC-SCNC: 9 MMOL/L (ref 8–16)
AST SERPL-CCNC: 27 U/L (ref 10–40)
BASOPHILS # BLD AUTO: 0.03 K/UL (ref 0–0.2)
BASOPHILS NFR BLD: 0.5 % (ref 0–1.9)
BILIRUB SERPL-MCNC: 0.4 MG/DL (ref 0.1–1)
BUN SERPL-MCNC: 10 MG/DL (ref 6–20)
CALCIUM SERPL-MCNC: 9.4 MG/DL (ref 8.7–10.5)
CHLORIDE SERPL-SCNC: 103 MMOL/L (ref 95–110)
CHOLEST SERPL-MCNC: 235 MG/DL (ref 120–199)
CHOLEST/HDLC SERPL: 4.2 {RATIO} (ref 2–5)
CO2 SERPL-SCNC: 26 MMOL/L (ref 23–29)
CREAT SERPL-MCNC: 0.9 MG/DL (ref 0.5–1.4)
DIFFERENTIAL METHOD: ABNORMAL
EOSINOPHIL # BLD AUTO: 0.1 K/UL (ref 0–0.5)
EOSINOPHIL NFR BLD: 2.1 % (ref 0–8)
ERYTHROCYTE [DISTWIDTH] IN BLOOD BY AUTOMATED COUNT: 11.7 % (ref 11.5–14.5)
EST. GFR  (AFRICAN AMERICAN): >60 ML/MIN/1.73 M^2
EST. GFR  (NON AFRICAN AMERICAN): >60 ML/MIN/1.73 M^2
GLUCOSE SERPL-MCNC: 92 MG/DL (ref 70–110)
HCT VFR BLD AUTO: 39.1 % (ref 37–48.5)
HDLC SERPL-MCNC: 56 MG/DL (ref 40–75)
HDLC SERPL: 23.8 % (ref 20–50)
HGB BLD-MCNC: 13.2 G/DL (ref 12–16)
LDLC SERPL CALC-MCNC: 132.6 MG/DL (ref 63–159)
LYMPHOCYTES # BLD AUTO: 1.6 K/UL (ref 1–4.8)
LYMPHOCYTES NFR BLD: 27.2 % (ref 18–48)
MCH RBC QN AUTO: 33.2 PG (ref 27–31)
MCHC RBC AUTO-ENTMCNC: 33.8 G/DL (ref 32–36)
MCV RBC AUTO: 99 FL (ref 82–98)
MONOCYTES # BLD AUTO: 0.4 K/UL (ref 0.3–1)
MONOCYTES NFR BLD: 7.5 % (ref 4–15)
NEUTROPHILS # BLD AUTO: 3.6 K/UL (ref 1.8–7.7)
NEUTROPHILS NFR BLD: 62.7 % (ref 38–73)
NONHDLC SERPL-MCNC: 179 MG/DL
PLATELET # BLD AUTO: 354 K/UL (ref 150–350)
PMV BLD AUTO: 9 FL (ref 9.2–12.9)
POTASSIUM SERPL-SCNC: 4.1 MMOL/L (ref 3.5–5.1)
PROT SERPL-MCNC: 7.3 G/DL (ref 6–8.4)
RBC # BLD AUTO: 3.97 M/UL (ref 4–5.4)
SODIUM SERPL-SCNC: 138 MMOL/L (ref 136–145)
TRIGL SERPL-MCNC: 232 MG/DL (ref 30–150)
WBC # BLD AUTO: 5.73 K/UL (ref 3.9–12.7)

## 2020-01-29 PROCEDURE — 80053 COMPREHEN METABOLIC PANEL: CPT

## 2020-01-29 PROCEDURE — 36415 COLL VENOUS BLD VENIPUNCTURE: CPT

## 2020-01-29 PROCEDURE — 80061 LIPID PANEL: CPT

## 2020-01-29 PROCEDURE — 85025 COMPLETE CBC W/AUTO DIFF WBC: CPT

## 2020-06-03 ENCOUNTER — PATIENT OUTREACH (OUTPATIENT)
Dept: ADMINISTRATIVE | Facility: OTHER | Age: 50
End: 2020-06-03

## 2020-06-04 ENCOUNTER — OFFICE VISIT (OUTPATIENT)
Dept: OBSTETRICS AND GYNECOLOGY | Facility: CLINIC | Age: 50
End: 2020-06-04
Payer: COMMERCIAL

## 2020-06-04 ENCOUNTER — HOSPITAL ENCOUNTER (OUTPATIENT)
Dept: RADIOLOGY | Facility: HOSPITAL | Age: 50
Discharge: HOME | End: 2020-06-04
Attending: OBSTETRICS & GYNECOLOGY
Payer: COMMERCIAL

## 2020-06-04 VITALS — HEIGHT: 71 IN | BODY MASS INDEX: 29.94 KG/M2 | WEIGHT: 213.88 LBS

## 2020-06-04 VITALS — BODY MASS INDEX: 28.73 KG/M2 | WEIGHT: 206 LBS

## 2020-06-04 DIAGNOSIS — R73.9 HYPERGLYCEMIA: ICD-10-CM

## 2020-06-04 DIAGNOSIS — Z12.31 VISIT FOR SCREENING MAMMOGRAM: ICD-10-CM

## 2020-06-04 DIAGNOSIS — Z01.419 ENCOUNTER FOR GYNECOLOGICAL EXAMINATION WITHOUT ABNORMAL FINDING: Primary | ICD-10-CM

## 2020-06-04 DIAGNOSIS — E78.2 MIXED HYPERLIPIDEMIA: ICD-10-CM

## 2020-06-04 PROCEDURE — 99396 PREV VISIT EST AGE 40-64: CPT | Mod: S$GLB,,, | Performed by: OBSTETRICS & GYNECOLOGY

## 2020-06-04 PROCEDURE — 99999 PR PBB SHADOW E&M-EST. PATIENT-LVL III: CPT | Mod: PBBFAC,,, | Performed by: OBSTETRICS & GYNECOLOGY

## 2020-06-04 PROCEDURE — 77067 MAMMO DIGITAL SCREENING BILAT WITH TOMOSYNTHESIS_CAD: ICD-10-PCS | Mod: 26,,, | Performed by: RADIOLOGY

## 2020-06-04 PROCEDURE — 77067 SCR MAMMO BI INCL CAD: CPT | Mod: TC

## 2020-06-04 PROCEDURE — 77067 SCR MAMMO BI INCL CAD: CPT | Mod: 26,,, | Performed by: RADIOLOGY

## 2020-06-04 PROCEDURE — 77063 BREAST TOMOSYNTHESIS BI: CPT | Mod: 26,,, | Performed by: RADIOLOGY

## 2020-06-04 PROCEDURE — 99396 PR PREVENTIVE VISIT,EST,40-64: ICD-10-PCS | Mod: S$GLB,,, | Performed by: OBSTETRICS & GYNECOLOGY

## 2020-06-04 PROCEDURE — 77063 MAMMO DIGITAL SCREENING BILAT WITH TOMOSYNTHESIS_CAD: ICD-10-PCS | Mod: 26,,, | Performed by: RADIOLOGY

## 2020-06-04 PROCEDURE — 99999 PR PBB SHADOW E&M-EST. PATIENT-LVL III: ICD-10-PCS | Mod: PBBFAC,,, | Performed by: OBSTETRICS & GYNECOLOGY

## 2020-06-04 NOTE — PROGRESS NOTES
HISTORY OF PRESENT ILLNESS:    Harpreet Peralta is a 50 y.o. female, , Patient's last menstrual period was 2020.,  presents for a routine exam and has no complaints.    Past Medical History:   Diagnosis Date    Anxiety     Depression     Urinary tract infection 2013    Staphylococcus saprophyticus       Past Surgical History:   Procedure Laterality Date    COLONOSCOPY N/A 2018    Procedure: COLONOSCOPY;  Surgeon: Young Pink MD;  Location: [address];  Service: Endoscopy;  Laterality: N/A;       MEDICATIONS AND ALLERGIES:      Current Outpatient Medications:     citalopram (CELEXA) 10 MG tablet, Take 1 tablet (10 mg total) by mouth once daily., Disp: 90 tablet, Rfl: 3    citalopram (CELEXA) 20 MG tablet, Take 1 tablet (20 mg total) by mouth once daily., Disp: 90 tablet, Rfl: 3    Review of patient's allergies indicates:  No Known Allergies    Family History   Problem Relation Age of Onset    Breast cancer Maternal Uncle 60    Breast cancer Paternal Aunt 70       Social History     Socioeconomic History    Marital status:      Spouse name: Not on file    Number of children: Not on file    Years of education: Not on file    Highest education level: Not on file   Occupational History    Not on file   Social Needs    Financial resource strain: Not very hard    Food insecurity:     Worry: Never true     Inability: Never true    Transportation needs:     Medical: No     Non-medical: No   Tobacco Use    Smoking status: Never Smoker    Smokeless tobacco: Never Used   Substance and Sexual Activity    Alcohol use: Yes     Alcohol/week: 1.0 standard drinks     Types: 1 Glasses of wine per week     Frequency: 2-4 times a month     Drinks per session: 3 or 4     Binge frequency: Less than monthly     Comment: socially    Drug use: No    Sexual activity: Yes     Partners: Male   Lifestyle    Physical activity:     Days per week: 4 days     Minutes per session: 150+ min  "   Stress: Only a little   Relationships    Social connections:     Talks on phone: Once a week     Gets together: Once a week     Attends Shinto service: Not on file     Active member of club or organization: Yes     Attends meetings of clubs or organizations: 1 to 4 times per year     Relationship status:    Other Topics Concern    Not on file   Social History Narrative    2018    Newly wed in 2016    Gardens and cook together    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  Infection History: Denies STDs. Denies PID.  Benign History: Denies uterine fibroids. Denies ovarian cysts. Denies endometriosis. Denies other conditions.  Cancer History: Denies cervical cancer. Denies uterine cancer or hyperplasia. Denies ovarian cancer. Denies vulvar cancer or pre-cancer. Denies vaginal cancer or pre-cancer. Denies breast cancer. Denies colon cancer.  Sexual Activity History: Reports currently being sexually active  Menstrual History: Monthly. Mod then light flow.   Dysmenorrhea History: Reports mild dysmenorrhea.       ROS:  GENERAL: No weight changes. No swelling. No fatigue. No fever.  CARDIOVASCULAR: No chest pain. No shortness of breath. No leg cramps.   NEUROLOGICAL: No headaches. No vision changes.  BREASTS: No pain. No lumps. No discharge.  ABDOMEN: No pain. No nausea. No vomiting. No diarrhea. No constipation.  REPRODUCTIVE: No abnormal bleeding.   VULVA: No pain. No lesions. No itching.  VAGINA: No relaxation. No itching. No odor. No discharge. No lesions.  URINARY: No incontinence. No nocturia. No frequency. No dysuria.    Ht 5' 11" (1.803 m)   Wt 97 kg (213 lb 13.5 oz)   LMP 05/27/2020   BMI 29.83 kg/m²     PE:  APPEARANCE: Well nourished, well developed, in no acute distress.  AFFECT: WNL, alert and oriented x 3.  SKIN: No acne or hirsutism.  NECK: " Neck symmetric, without masses or thyromegaly.  NODES: No inguinal, cervical, axillary or femoral lymph node enlargement.  CHEST: Good respiratory effort.   ABDOMEN: Soft. No tenderness or masses. No hepatosplenomegaly. No hernias.  BREASTS: Symmetrical, no skin changes, visible lesions, palpable masses or nipple discharge bilaterally.  PELVIC: External female genitalia without lesions.  Female hair distribution. Adequate perineal body, Normal urethral meatus. Vagina moist and well rugated without lesions or discharge.  No significant cystocele or rectocele present. Cervix pink without lesions, discharge or tenderness. Uterus is 4-6 week size, regular, mobile and nontender. Adnexa without masses or tenderness.  EXTREMITIES: No edema    DIAGNOSIS:  1. Encounter for gynecological examination without abnormal finding    2. Visit for screening mammogram    3. Mixed hyperlipidemia    4. Hyperglycemia        PLAN:    Orders Placed This Encounter    Mammo Digital Screening Bilat w/ Matteo       COUNSELING:  The patient was counseled today on:  -A.C.S. Pap and pelvic exam guidelines (pap every 3 years), recomendations for yearly mammogram;  -to follow up with her PCP for other health maintenance.    FOLLOW-UP with me annually.

## 2020-07-30 ENCOUNTER — OFFICE VISIT (OUTPATIENT)
Dept: INTERNAL MEDICINE | Facility: CLINIC | Age: 50
End: 2020-07-30
Payer: COMMERCIAL

## 2020-07-30 ENCOUNTER — LAB VISIT (OUTPATIENT)
Dept: LAB | Facility: HOSPITAL | Age: 50
End: 2020-07-30
Attending: INTERNAL MEDICINE
Payer: COMMERCIAL

## 2020-07-30 VITALS
TEMPERATURE: 97 F | BODY MASS INDEX: 29.81 KG/M2 | OXYGEN SATURATION: 97 % | HEIGHT: 71 IN | DIASTOLIC BLOOD PRESSURE: 88 MMHG | WEIGHT: 212.94 LBS | HEART RATE: 80 BPM | SYSTOLIC BLOOD PRESSURE: 138 MMHG

## 2020-07-30 DIAGNOSIS — Z00.00 ANNUAL PHYSICAL EXAM: Primary | ICD-10-CM

## 2020-07-30 DIAGNOSIS — Z11.59 ENCOUNTER FOR HEPATITIS C SCREENING TEST FOR LOW RISK PATIENT: ICD-10-CM

## 2020-07-30 DIAGNOSIS — Z00.00 ANNUAL PHYSICAL EXAM: ICD-10-CM

## 2020-07-30 DIAGNOSIS — F41.1 GAD (GENERALIZED ANXIETY DISORDER): ICD-10-CM

## 2020-07-30 DIAGNOSIS — N95.1 PERIMENOPAUSAL: ICD-10-CM

## 2020-07-30 DIAGNOSIS — Z23 NEED FOR SHINGLES VACCINE: ICD-10-CM

## 2020-07-30 LAB
ALBUMIN SERPL BCP-MCNC: 4.4 G/DL (ref 3.5–5.2)
ALP SERPL-CCNC: 75 U/L (ref 55–135)
ALT SERPL W/O P-5'-P-CCNC: 33 U/L (ref 10–44)
ANION GAP SERPL CALC-SCNC: 7 MMOL/L (ref 8–16)
AST SERPL-CCNC: 25 U/L (ref 10–40)
BASOPHILS # BLD AUTO: 0.04 K/UL (ref 0–0.2)
BASOPHILS NFR BLD: 0.8 % (ref 0–1.9)
BILIRUB SERPL-MCNC: 0.5 MG/DL (ref 0.1–1)
BUN SERPL-MCNC: 15 MG/DL (ref 6–20)
CALCIUM SERPL-MCNC: 9.8 MG/DL (ref 8.7–10.5)
CHLORIDE SERPL-SCNC: 102 MMOL/L (ref 95–110)
CHOLEST SERPL-MCNC: 275 MG/DL (ref 120–199)
CHOLEST/HDLC SERPL: 4.7 {RATIO} (ref 2–5)
CO2 SERPL-SCNC: 29 MMOL/L (ref 23–29)
CREAT SERPL-MCNC: 0.9 MG/DL (ref 0.5–1.4)
DIFFERENTIAL METHOD: ABNORMAL
EOSINOPHIL # BLD AUTO: 0.1 K/UL (ref 0–0.5)
EOSINOPHIL NFR BLD: 1.9 % (ref 0–8)
ERYTHROCYTE [DISTWIDTH] IN BLOOD BY AUTOMATED COUNT: 11.3 % (ref 11.5–14.5)
EST. GFR  (AFRICAN AMERICAN): >60 ML/MIN/1.73 M^2
EST. GFR  (NON AFRICAN AMERICAN): >60 ML/MIN/1.73 M^2
GLUCOSE SERPL-MCNC: 108 MG/DL (ref 70–110)
HCT VFR BLD AUTO: 41.3 % (ref 37–48.5)
HDLC SERPL-MCNC: 58 MG/DL (ref 40–75)
HDLC SERPL: 21.1 % (ref 20–50)
HGB BLD-MCNC: 13.7 G/DL (ref 12–16)
IMM GRANULOCYTES # BLD AUTO: 0.01 K/UL (ref 0–0.04)
IMM GRANULOCYTES NFR BLD AUTO: 0.2 % (ref 0–0.5)
LDLC SERPL CALC-MCNC: 179.8 MG/DL (ref 63–159)
LYMPHOCYTES # BLD AUTO: 1.5 K/UL (ref 1–4.8)
LYMPHOCYTES NFR BLD: 31.5 % (ref 18–48)
MCH RBC QN AUTO: 33.3 PG (ref 27–31)
MCHC RBC AUTO-ENTMCNC: 33.2 G/DL (ref 32–36)
MCV RBC AUTO: 101 FL (ref 82–98)
MONOCYTES # BLD AUTO: 0.4 K/UL (ref 0.3–1)
MONOCYTES NFR BLD: 8.5 % (ref 4–15)
NEUTROPHILS # BLD AUTO: 2.7 K/UL (ref 1.8–7.7)
NEUTROPHILS NFR BLD: 57.1 % (ref 38–73)
NONHDLC SERPL-MCNC: 217 MG/DL
NRBC BLD-RTO: 0 /100 WBC
PLATELET # BLD AUTO: 380 K/UL (ref 150–350)
PMV BLD AUTO: 9.3 FL (ref 9.2–12.9)
POTASSIUM SERPL-SCNC: 4.3 MMOL/L (ref 3.5–5.1)
PROT SERPL-MCNC: 8 G/DL (ref 6–8.4)
RBC # BLD AUTO: 4.11 M/UL (ref 4–5.4)
SODIUM SERPL-SCNC: 138 MMOL/L (ref 136–145)
TRIGL SERPL-MCNC: 186 MG/DL (ref 30–150)
WBC # BLD AUTO: 4.8 K/UL (ref 3.9–12.7)

## 2020-07-30 PROCEDURE — 80053 COMPREHEN METABOLIC PANEL: CPT

## 2020-07-30 PROCEDURE — 86803 HEPATITIS C AB TEST: CPT

## 2020-07-30 PROCEDURE — 80061 LIPID PANEL: CPT

## 2020-07-30 PROCEDURE — 36415 COLL VENOUS BLD VENIPUNCTURE: CPT

## 2020-07-30 PROCEDURE — 3008F BODY MASS INDEX DOCD: CPT | Mod: CPTII,S$GLB,, | Performed by: INTERNAL MEDICINE

## 2020-07-30 PROCEDURE — 99999 PR PBB SHADOW E&M-EST. PATIENT-LVL IV: CPT | Mod: PBBFAC,,, | Performed by: INTERNAL MEDICINE

## 2020-07-30 PROCEDURE — 99999 PR PBB SHADOW E&M-EST. PATIENT-LVL IV: ICD-10-PCS | Mod: PBBFAC,,, | Performed by: INTERNAL MEDICINE

## 2020-07-30 PROCEDURE — 85025 COMPLETE CBC W/AUTO DIFF WBC: CPT

## 2020-07-30 PROCEDURE — 99396 PR PREVENTIVE VISIT,EST,40-64: ICD-10-PCS | Mod: S$GLB,,, | Performed by: INTERNAL MEDICINE

## 2020-07-30 PROCEDURE — 3008F PR BODY MASS INDEX (BMI) DOCUMENTED: ICD-10-PCS | Mod: CPTII,S$GLB,, | Performed by: INTERNAL MEDICINE

## 2020-07-30 PROCEDURE — 99396 PREV VISIT EST AGE 40-64: CPT | Mod: S$GLB,,, | Performed by: INTERNAL MEDICINE

## 2020-07-30 RX ORDER — CITALOPRAM 40 MG/1
40 TABLET, FILM COATED ORAL DAILY
Qty: 90 TABLET | Refills: 3 | Status: SHIPPED | OUTPATIENT
Start: 2020-07-30 | End: 2021-08-13 | Stop reason: SDUPTHER

## 2020-07-31 ENCOUNTER — PATIENT MESSAGE (OUTPATIENT)
Dept: INTERNAL MEDICINE | Facility: CLINIC | Age: 50
End: 2020-07-31

## 2020-07-31 DIAGNOSIS — E78.2 MIXED HYPERLIPIDEMIA: Primary | ICD-10-CM

## 2020-07-31 DIAGNOSIS — R79.9 ABNORMAL BLOOD CHEMISTRY TEST: ICD-10-CM

## 2020-07-31 LAB — HCV AB SERPL QL IA: NEGATIVE

## 2020-07-31 NOTE — TELEPHONE ENCOUNTER
I sent an explanation of her blood work in the patient portal  She needs to have a repeat CBC and lipid panel in approximately 4-6 months  She might want to schedule on a Saturday I do not know if she can schedule online  If  she can schedule blood work fasting online please let me know because I have been told that you cannot schedule lab work in the patient portal   Is that true?

## 2020-07-31 NOTE — TELEPHONE ENCOUNTER
Pt would like to discuss test results, she have scheduled an virtual visit on 8/13 but pt stated that is to far and her research is showing she may have anemia, can you advise.

## 2020-08-02 NOTE — PROGRESS NOTES
Subjective:       Patient ID: Harpreet Peralta is a 50 y.o. female.    Chief Complaint: Annual Exam   She presents for routine annual physical    In general she enjoys good health.    Her only prescription medication is citalopram which she has taken for many years for the control of a generalized anxiety disorder.  She requests that this be increased to 40 mg daily because her 18-year-old stepson has bipolar disease, refuses to take his medication and has been picked up by the police.  It is an awefull situation.  his mother and her  are trying to help, but it is a constant roller coaster.  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    She would like to have blood test performed because she has not been eating as healthy during the COVID pandemic as she normally does and she has been drinking more than usual.  HPI  Review of Systems   Constitutional: Negative for activity change and unexpected weight change.   HENT: Negative for hearing loss, rhinorrhea and trouble swallowing.    Eyes: Negative for discharge and visual disturbance.   Respiratory: Negative for chest tightness and wheezing.    Cardiovascular: Negative for chest pain and palpitations.   Gastrointestinal: Negative for blood in stool, constipation, diarrhea and vomiting.   Endocrine: Negative for polydipsia and polyuria.   Genitourinary: Negative for difficulty urinating, dysuria, hematuria and menstrual problem.   Musculoskeletal: Negative for arthralgias, joint swelling and neck pain.   Neurological: Negative for weakness and headaches.   Psychiatric/Behavioral: Negative for confusion and dysphoric mood.       Objective:      Physical Exam  HENT:      Head: Atraumatic.   Eyes:      General: No scleral icterus.     Conjunctiva/sclera: Conjunctivae normal.   Neck:      Musculoskeletal: Neck supple.   Cardiovascular:      Rate and Rhythm: Normal rate and regular rhythm.      Heart sounds: No murmur. No friction rub. No gallop.    Pulmonary:       Effort: Pulmonary effort is normal. No respiratory distress.      Breath sounds: Normal breath sounds. No wheezing, rhonchi or rales.   Abdominal:      General: Abdomen is flat. Bowel sounds are normal. There is no distension.      Palpations: Abdomen is soft. There is no mass.      Tenderness: There is no abdominal tenderness. There is no right CVA tenderness, left CVA tenderness, guarding or rebound.      Hernia: No hernia is present.   Lymphadenopathy:      Cervical: No cervical adenopathy.   Skin:     General: Skin is warm and dry.   Neurological:      Mental Status: She is alert.   Psychiatric:         Behavior: Behavior normal.         Assessment:       1. Annual physical exam    2. SANDHYA (generalized anxiety disorder)    3. Perimenopausal    4. Need for shingles vaccine    5. Encounter for hepatitis C screening test for low risk patient        Plan:   Harpreet was seen today for annual exam.    Diagnoses and all orders for this visit:    Annual physical exam  -     CBC auto differential; Future  -     Comprehensive metabolic panel; Future  -     Lipid Panel; Future    SANDHYA (generalized anxiety disorder)    Perimenopausal    Need for shingles vaccine    Encounter for hepatitis C screening test for low risk patient  -     Hepatitis C Antibody; Future    Other orders  -     citalopram (CELEXA) 40 MG tablet; Take 1 tablet (40 mg total) by mouth once daily.      Follow up in about 3 months (around 10/30/2020).

## 2021-02-27 ENCOUNTER — PATIENT MESSAGE (OUTPATIENT)
Dept: INTERNAL MEDICINE | Facility: CLINIC | Age: 51
End: 2021-02-27

## 2021-03-02 ENCOUNTER — PATIENT MESSAGE (OUTPATIENT)
Dept: INTERNAL MEDICINE | Facility: CLINIC | Age: 51
End: 2021-03-02

## 2021-03-02 RX ORDER — NITROFURANTOIN 25; 75 MG/1; MG/1
100 CAPSULE ORAL 2 TIMES DAILY
Qty: 14 CAPSULE | Refills: 0 | Status: SHIPPED | OUTPATIENT
Start: 2021-03-02 | End: 2021-03-09

## 2021-03-23 ENCOUNTER — TELEPHONE (OUTPATIENT)
Dept: INTERNAL MEDICINE | Facility: CLINIC | Age: 51
End: 2021-03-23

## 2021-03-23 DIAGNOSIS — M17.0 PRIMARY OSTEOARTHRITIS OF BOTH KNEES: ICD-10-CM

## 2021-03-25 ENCOUNTER — OFFICE VISIT (OUTPATIENT)
Dept: ORTHOPEDICS | Facility: CLINIC | Age: 51
End: 2021-03-25
Payer: COMMERCIAL

## 2021-03-25 ENCOUNTER — PATIENT OUTREACH (OUTPATIENT)
Dept: ADMINISTRATIVE | Facility: OTHER | Age: 51
End: 2021-03-25

## 2021-03-25 ENCOUNTER — HOSPITAL ENCOUNTER (OUTPATIENT)
Dept: RADIOLOGY | Facility: HOSPITAL | Age: 51
Discharge: HOME | End: 2021-03-25
Attending: NURSE PRACTITIONER
Payer: COMMERCIAL

## 2021-03-25 VITALS
BODY MASS INDEX: 29.98 KG/M2 | HEIGHT: 71 IN | HEART RATE: 66 BPM | WEIGHT: 214.19 LBS | RESPIRATION RATE: 18 BRPM | SYSTOLIC BLOOD PRESSURE: 125 MMHG | TEMPERATURE: 98 F | DIASTOLIC BLOOD PRESSURE: 75 MMHG

## 2021-03-25 DIAGNOSIS — M17.0 PRIMARY OSTEOARTHRITIS OF BOTH KNEES: ICD-10-CM

## 2021-03-25 DIAGNOSIS — M17.12 PRIMARY OSTEOARTHRITIS OF LEFT KNEE: Primary | ICD-10-CM

## 2021-03-25 PROCEDURE — 99999 PR PBB SHADOW E&M-EST. PATIENT-LVL III: ICD-10-PCS | Mod: PBBFAC,,, | Performed by: NURSE PRACTITIONER

## 2021-03-25 PROCEDURE — 73564 X-RAY EXAM KNEE 4 OR MORE: CPT | Mod: TC,50

## 2021-03-25 PROCEDURE — 1126F PR PAIN SEVERITY QUANTIFIED, NO PAIN PRESENT: ICD-10-PCS | Mod: S$GLB,,, | Performed by: NURSE PRACTITIONER

## 2021-03-25 PROCEDURE — 3008F BODY MASS INDEX DOCD: CPT | Mod: CPTII,S$GLB,, | Performed by: NURSE PRACTITIONER

## 2021-03-25 PROCEDURE — 1126F AMNT PAIN NOTED NONE PRSNT: CPT | Mod: S$GLB,,, | Performed by: NURSE PRACTITIONER

## 2021-03-25 PROCEDURE — 20610 PR DRAIN/INJECT LARGE JOINT/BURSA: ICD-10-PCS | Mod: LT,S$GLB,, | Performed by: NURSE PRACTITIONER

## 2021-03-25 PROCEDURE — 3008F PR BODY MASS INDEX (BMI) DOCUMENTED: ICD-10-PCS | Mod: CPTII,S$GLB,, | Performed by: NURSE PRACTITIONER

## 2021-03-25 PROCEDURE — 20610 DRAIN/INJ JOINT/BURSA W/O US: CPT | Mod: LT,S$GLB,, | Performed by: NURSE PRACTITIONER

## 2021-03-25 PROCEDURE — 73564 XR KNEE ORTHO BILAT WITH FLEXION: ICD-10-PCS | Mod: 26,,, | Performed by: RADIOLOGY

## 2021-03-25 PROCEDURE — 99999 PR PBB SHADOW E&M-EST. PATIENT-LVL III: CPT | Mod: PBBFAC,,, | Performed by: NURSE PRACTITIONER

## 2021-03-25 PROCEDURE — 99204 OFFICE O/P NEW MOD 45 MIN: CPT | Mod: 25,S$GLB,, | Performed by: NURSE PRACTITIONER

## 2021-03-25 PROCEDURE — 73564 X-RAY EXAM KNEE 4 OR MORE: CPT | Mod: 26,,, | Performed by: RADIOLOGY

## 2021-03-25 PROCEDURE — 99204 PR OFFICE/OUTPT VISIT, NEW, LEVL IV, 45-59 MIN: ICD-10-PCS | Mod: 25,S$GLB,, | Performed by: NURSE PRACTITIONER

## 2021-03-25 RX ORDER — TRIAMCINOLONE ACETONIDE 40 MG/ML
40 INJECTION, SUSPENSION INTRA-ARTICULAR; INTRAMUSCULAR
Status: COMPLETED | OUTPATIENT
Start: 2021-03-25 | End: 2021-03-25

## 2021-03-25 RX ADMIN — TRIAMCINOLONE ACETONIDE 40 MG: 40 INJECTION, SUSPENSION INTRA-ARTICULAR; INTRAMUSCULAR at 04:03

## 2021-06-06 ENCOUNTER — HOSPITAL ENCOUNTER (OUTPATIENT)
Dept: RADIOLOGY | Facility: HOSPITAL | Age: 51
Discharge: HOME | End: 2021-06-06
Attending: OBSTETRICS & GYNECOLOGY
Payer: COMMERCIAL

## 2021-06-06 DIAGNOSIS — Z12.31 VISIT FOR SCREENING MAMMOGRAM: ICD-10-CM

## 2021-06-06 PROCEDURE — 77063 MAMMO DIGITAL SCREENING BILAT WITH TOMOSYNTHESIS_CAD: ICD-10-PCS | Mod: 26,,, | Performed by: RADIOLOGY

## 2021-06-06 PROCEDURE — 77067 MAMMO DIGITAL SCREENING BILAT WITH TOMOSYNTHESIS_CAD: ICD-10-PCS | Mod: 26,,, | Performed by: RADIOLOGY

## 2021-06-06 PROCEDURE — 77063 BREAST TOMOSYNTHESIS BI: CPT | Mod: 26,,, | Performed by: RADIOLOGY

## 2021-06-06 PROCEDURE — 77067 SCR MAMMO BI INCL CAD: CPT | Mod: 26,,, | Performed by: RADIOLOGY

## 2021-06-06 PROCEDURE — 77067 SCR MAMMO BI INCL CAD: CPT | Mod: TC

## 2021-06-14 ENCOUNTER — PATIENT MESSAGE (OUTPATIENT)
Dept: INTERNAL MEDICINE | Facility: CLINIC | Age: 51
End: 2021-06-14

## 2021-06-14 DIAGNOSIS — E78.2 MIXED HYPERLIPIDEMIA: ICD-10-CM

## 2021-06-14 DIAGNOSIS — Z00.00 ANNUAL PHYSICAL EXAM: ICD-10-CM

## 2021-06-14 DIAGNOSIS — F41.1 GAD (GENERALIZED ANXIETY DISORDER): ICD-10-CM

## 2021-06-14 DIAGNOSIS — M17.0 PRIMARY OSTEOARTHRITIS OF BOTH KNEES: ICD-10-CM

## 2021-06-14 DIAGNOSIS — N95.1 PERIMENOPAUSAL: ICD-10-CM

## 2021-06-14 DIAGNOSIS — N20.0 KIDNEY STONES: Primary | ICD-10-CM

## 2021-06-17 ENCOUNTER — PATIENT MESSAGE (OUTPATIENT)
Dept: INTERNAL MEDICINE | Facility: CLINIC | Age: 51
End: 2021-06-17

## 2021-06-21 ENCOUNTER — LAB VISIT (OUTPATIENT)
Dept: LAB | Facility: HOSPITAL | Age: 51
End: 2021-06-21
Attending: INTERNAL MEDICINE
Payer: COMMERCIAL

## 2021-06-21 DIAGNOSIS — Z00.00 ANNUAL PHYSICAL EXAM: ICD-10-CM

## 2021-06-21 LAB
ALBUMIN SERPL BCP-MCNC: 4 G/DL (ref 3.5–5.2)
ALP SERPL-CCNC: 71 U/L (ref 55–135)
ALT SERPL W/O P-5'-P-CCNC: 33 U/L (ref 10–44)
ANION GAP SERPL CALC-SCNC: 10 MMOL/L (ref 8–16)
AST SERPL-CCNC: 26 U/L (ref 10–40)
BASOPHILS # BLD AUTO: 0.05 K/UL (ref 0–0.2)
BASOPHILS NFR BLD: 1 % (ref 0–1.9)
BILIRUB SERPL-MCNC: 0.5 MG/DL (ref 0.1–1)
BUN SERPL-MCNC: 12 MG/DL (ref 6–20)
CALCIUM SERPL-MCNC: 9.9 MG/DL (ref 8.7–10.5)
CHLORIDE SERPL-SCNC: 104 MMOL/L (ref 95–110)
CO2 SERPL-SCNC: 24 MMOL/L (ref 23–29)
CREAT SERPL-MCNC: 0.8 MG/DL (ref 0.5–1.4)
DIFFERENTIAL METHOD: ABNORMAL
EOSINOPHIL # BLD AUTO: 0.1 K/UL (ref 0–0.5)
EOSINOPHIL NFR BLD: 2.2 % (ref 0–8)
ERYTHROCYTE [DISTWIDTH] IN BLOOD BY AUTOMATED COUNT: 11.9 % (ref 11.5–14.5)
EST. GFR  (AFRICAN AMERICAN): >60 ML/MIN/1.73 M^2
EST. GFR  (NON AFRICAN AMERICAN): >60 ML/MIN/1.73 M^2
ESTIMATED AVG GLUCOSE: 97 MG/DL (ref 68–131)
GLUCOSE SERPL-MCNC: 100 MG/DL (ref 70–110)
HBA1C MFR BLD: 5 % (ref 4–5.6)
HCT VFR BLD AUTO: 39 % (ref 37–48.5)
HGB BLD-MCNC: 13 G/DL (ref 12–16)
IMM GRANULOCYTES # BLD AUTO: 0.01 K/UL (ref 0–0.04)
IMM GRANULOCYTES NFR BLD AUTO: 0.2 % (ref 0–0.5)
LYMPHOCYTES # BLD AUTO: 1.6 K/UL (ref 1–4.8)
LYMPHOCYTES NFR BLD: 32.4 % (ref 18–48)
MCH RBC QN AUTO: 32.5 PG (ref 27–31)
MCHC RBC AUTO-ENTMCNC: 33.3 G/DL (ref 32–36)
MCV RBC AUTO: 98 FL (ref 82–98)
MONOCYTES # BLD AUTO: 0.4 K/UL (ref 0.3–1)
MONOCYTES NFR BLD: 8.5 % (ref 4–15)
NEUTROPHILS # BLD AUTO: 2.8 K/UL (ref 1.8–7.7)
NEUTROPHILS NFR BLD: 55.7 % (ref 38–73)
NRBC BLD-RTO: 0 /100 WBC
PLATELET # BLD AUTO: 348 K/UL (ref 150–450)
PMV BLD AUTO: 9.1 FL (ref 9.2–12.9)
POTASSIUM SERPL-SCNC: 4.1 MMOL/L (ref 3.5–5.1)
PROT SERPL-MCNC: 7.3 G/DL (ref 6–8.4)
RBC # BLD AUTO: 4 M/UL (ref 4–5.4)
SODIUM SERPL-SCNC: 138 MMOL/L (ref 136–145)
WBC # BLD AUTO: 4.97 K/UL (ref 3.9–12.7)

## 2021-06-21 PROCEDURE — 85025 COMPLETE CBC W/AUTO DIFF WBC: CPT | Performed by: INTERNAL MEDICINE

## 2021-06-21 PROCEDURE — 36415 COLL VENOUS BLD VENIPUNCTURE: CPT | Performed by: INTERNAL MEDICINE

## 2021-06-21 PROCEDURE — 83036 HEMOGLOBIN GLYCOSYLATED A1C: CPT | Performed by: INTERNAL MEDICINE

## 2021-06-21 PROCEDURE — 80053 COMPREHEN METABOLIC PANEL: CPT | Performed by: INTERNAL MEDICINE

## 2021-06-23 ENCOUNTER — TELEPHONE (OUTPATIENT)
Dept: INTERNAL MEDICINE | Facility: CLINIC | Age: 51
End: 2021-06-23

## 2021-06-23 DIAGNOSIS — Z13.6 ENCOUNTER FOR SCREENING FOR CORONARY ARTERY DISEASE: Primary | ICD-10-CM

## 2021-06-23 DIAGNOSIS — I25.10 ATHEROSCLEROSIS OF NATIVE CORONARY ARTERY OF NATIVE HEART WITHOUT ANGINA PECTORIS: ICD-10-CM

## 2021-06-24 ENCOUNTER — OFFICE VISIT (OUTPATIENT)
Dept: INTERNAL MEDICINE | Facility: CLINIC | Age: 51
End: 2021-06-24
Payer: COMMERCIAL

## 2021-06-24 VITALS
BODY MASS INDEX: 30.03 KG/M2 | DIASTOLIC BLOOD PRESSURE: 72 MMHG | WEIGHT: 214.5 LBS | OXYGEN SATURATION: 97 % | HEIGHT: 71 IN | HEART RATE: 79 BPM | SYSTOLIC BLOOD PRESSURE: 114 MMHG

## 2021-06-24 DIAGNOSIS — N95.1 PERIMENOPAUSAL: Primary | ICD-10-CM

## 2021-06-24 PROCEDURE — 3008F BODY MASS INDEX DOCD: CPT | Mod: CPTII,S$GLB,, | Performed by: INTERNAL MEDICINE

## 2021-06-24 PROCEDURE — 1126F AMNT PAIN NOTED NONE PRSNT: CPT | Mod: S$GLB,,, | Performed by: INTERNAL MEDICINE

## 2021-06-24 PROCEDURE — 99499 NO LOS: ICD-10-PCS | Mod: S$GLB,,, | Performed by: INTERNAL MEDICINE

## 2021-06-24 PROCEDURE — 99499 UNLISTED E&M SERVICE: CPT | Mod: S$GLB,,, | Performed by: INTERNAL MEDICINE

## 2021-06-24 PROCEDURE — 1126F PR PAIN SEVERITY QUANTIFIED, NO PAIN PRESENT: ICD-10-PCS | Mod: S$GLB,,, | Performed by: INTERNAL MEDICINE

## 2021-06-24 PROCEDURE — 99999 PR PBB SHADOW E&M-EST. PATIENT-LVL III: CPT | Mod: PBBFAC,,, | Performed by: INTERNAL MEDICINE

## 2021-06-24 PROCEDURE — 99999 PR PBB SHADOW E&M-EST. PATIENT-LVL III: ICD-10-PCS | Mod: PBBFAC,,, | Performed by: INTERNAL MEDICINE

## 2021-06-24 PROCEDURE — 3008F PR BODY MASS INDEX (BMI) DOCUMENTED: ICD-10-PCS | Mod: CPTII,S$GLB,, | Performed by: INTERNAL MEDICINE

## 2021-07-06 ENCOUNTER — PATIENT MESSAGE (OUTPATIENT)
Dept: ADMINISTRATIVE | Facility: HOSPITAL | Age: 51
End: 2021-07-06

## 2021-07-19 ENCOUNTER — HOSPITAL ENCOUNTER (OUTPATIENT)
Dept: RADIOLOGY | Facility: HOSPITAL | Age: 51
Discharge: HOME | End: 2021-07-19
Attending: INTERNAL MEDICINE
Payer: COMMERCIAL

## 2021-07-19 DIAGNOSIS — Z13.6 ENCOUNTER FOR SCREENING FOR CORONARY ARTERY DISEASE: ICD-10-CM

## 2021-07-19 PROCEDURE — 75571 CT HRT W/O DYE W/CA TEST: CPT | Mod: TC

## 2021-07-19 PROCEDURE — 75571 CT CALCIUM SCORING CARDIAC: ICD-10-PCS | Mod: 26,,, | Performed by: RADIOLOGY

## 2021-07-19 PROCEDURE — 75571 CT HRT W/O DYE W/CA TEST: CPT | Mod: 26,,, | Performed by: RADIOLOGY

## 2021-07-21 ENCOUNTER — PATIENT MESSAGE (OUTPATIENT)
Dept: INTERNAL MEDICINE | Facility: CLINIC | Age: 51
End: 2021-07-21

## 2021-08-13 ENCOUNTER — OFFICE VISIT (OUTPATIENT)
Dept: INTERNAL MEDICINE | Facility: CLINIC | Age: 51
End: 2021-08-13
Payer: COMMERCIAL

## 2021-08-13 VITALS
HEIGHT: 71 IN | HEART RATE: 78 BPM | WEIGHT: 213.38 LBS | OXYGEN SATURATION: 95 % | BODY MASS INDEX: 29.87 KG/M2 | DIASTOLIC BLOOD PRESSURE: 72 MMHG | SYSTOLIC BLOOD PRESSURE: 124 MMHG

## 2021-08-13 DIAGNOSIS — F41.1 GAD (GENERALIZED ANXIETY DISORDER): ICD-10-CM

## 2021-08-13 DIAGNOSIS — Z23 NEED FOR DIPHTHERIA-TETANUS-PERTUSSIS (TDAP) VACCINE: ICD-10-CM

## 2021-08-13 DIAGNOSIS — Z00.00 ANNUAL PHYSICAL EXAM: Primary | ICD-10-CM

## 2021-08-13 DIAGNOSIS — Z23 NEED FOR SHINGLES VACCINE: ICD-10-CM

## 2021-08-13 PROCEDURE — 3044F HG A1C LEVEL LT 7.0%: CPT | Mod: CPTII,S$GLB,, | Performed by: INTERNAL MEDICINE

## 2021-08-13 PROCEDURE — 3044F PR MOST RECENT HEMOGLOBIN A1C LEVEL <7.0%: ICD-10-PCS | Mod: CPTII,S$GLB,, | Performed by: INTERNAL MEDICINE

## 2021-08-13 PROCEDURE — 99999 PR PBB SHADOW E&M-EST. PATIENT-LVL III: ICD-10-PCS | Mod: PBBFAC,,, | Performed by: INTERNAL MEDICINE

## 2021-08-13 PROCEDURE — 3078F PR MOST RECENT DIASTOLIC BLOOD PRESSURE < 80 MM HG: ICD-10-PCS | Mod: CPTII,S$GLB,, | Performed by: INTERNAL MEDICINE

## 2021-08-13 PROCEDURE — 3008F BODY MASS INDEX DOCD: CPT | Mod: CPTII,S$GLB,, | Performed by: INTERNAL MEDICINE

## 2021-08-13 PROCEDURE — 1160F PR REVIEW ALL MEDS BY PRESCRIBER/CLIN PHARMACIST DOCUMENTED: ICD-10-PCS | Mod: CPTII,S$GLB,, | Performed by: INTERNAL MEDICINE

## 2021-08-13 PROCEDURE — 1160F RVW MEDS BY RX/DR IN RCRD: CPT | Mod: CPTII,S$GLB,, | Performed by: INTERNAL MEDICINE

## 2021-08-13 PROCEDURE — 3008F PR BODY MASS INDEX (BMI) DOCUMENTED: ICD-10-PCS | Mod: CPTII,S$GLB,, | Performed by: INTERNAL MEDICINE

## 2021-08-13 PROCEDURE — 3074F SYST BP LT 130 MM HG: CPT | Mod: CPTII,S$GLB,, | Performed by: INTERNAL MEDICINE

## 2021-08-13 PROCEDURE — 99396 PREV VISIT EST AGE 40-64: CPT | Mod: S$GLB,,, | Performed by: INTERNAL MEDICINE

## 2021-08-13 PROCEDURE — 3074F PR MOST RECENT SYSTOLIC BLOOD PRESSURE < 130 MM HG: ICD-10-PCS | Mod: CPTII,S$GLB,, | Performed by: INTERNAL MEDICINE

## 2021-08-13 PROCEDURE — 99999 PR PBB SHADOW E&M-EST. PATIENT-LVL III: CPT | Mod: PBBFAC,,, | Performed by: INTERNAL MEDICINE

## 2021-08-13 PROCEDURE — 1159F PR MEDICATION LIST DOCUMENTED IN MEDICAL RECORD: ICD-10-PCS | Mod: CPTII,S$GLB,, | Performed by: INTERNAL MEDICINE

## 2021-08-13 PROCEDURE — 99396 PR PREVENTIVE VISIT,EST,40-64: ICD-10-PCS | Mod: S$GLB,,, | Performed by: INTERNAL MEDICINE

## 2021-08-13 PROCEDURE — 1159F MED LIST DOCD IN RCRD: CPT | Mod: CPTII,S$GLB,, | Performed by: INTERNAL MEDICINE

## 2021-08-13 PROCEDURE — 3078F DIAST BP <80 MM HG: CPT | Mod: CPTII,S$GLB,, | Performed by: INTERNAL MEDICINE

## 2021-08-13 RX ORDER — CITALOPRAM 40 MG/1
40 TABLET, FILM COATED ORAL DAILY
Qty: 90 TABLET | Refills: 3 | Status: SHIPPED | OUTPATIENT
Start: 2021-08-13 | End: 2022-10-04 | Stop reason: SDUPTHER

## 2021-10-04 ENCOUNTER — PATIENT MESSAGE (OUTPATIENT)
Dept: ADMINISTRATIVE | Facility: HOSPITAL | Age: 51
End: 2021-10-04

## 2021-11-17 ENCOUNTER — PATIENT OUTREACH (OUTPATIENT)
Dept: ADMINISTRATIVE | Facility: OTHER | Age: 51
End: 2021-11-17
Payer: COMMERCIAL

## 2022-01-18 ENCOUNTER — PATIENT MESSAGE (OUTPATIENT)
Dept: ADMINISTRATIVE | Facility: HOSPITAL | Age: 52
End: 2022-01-18
Payer: COMMERCIAL

## 2022-01-24 ENCOUNTER — PATIENT OUTREACH (OUTPATIENT)
Dept: ADMINISTRATIVE | Facility: OTHER | Age: 52
End: 2022-01-24
Payer: COMMERCIAL

## 2022-01-26 ENCOUNTER — TELEPHONE (OUTPATIENT)
Dept: OBSTETRICS AND GYNECOLOGY | Facility: CLINIC | Age: 52
End: 2022-01-26
Payer: COMMERCIAL

## 2022-01-26 ENCOUNTER — OFFICE VISIT (OUTPATIENT)
Dept: OBSTETRICS AND GYNECOLOGY | Facility: CLINIC | Age: 52
End: 2022-01-26
Payer: COMMERCIAL

## 2022-01-26 VITALS
HEIGHT: 71 IN | BODY MASS INDEX: 30.19 KG/M2 | DIASTOLIC BLOOD PRESSURE: 62 MMHG | WEIGHT: 215.63 LBS | SYSTOLIC BLOOD PRESSURE: 118 MMHG

## 2022-01-26 DIAGNOSIS — Z01.419 ENCOUNTER FOR GYNECOLOGICAL EXAMINATION WITHOUT ABNORMAL FINDING: Primary | ICD-10-CM

## 2022-01-26 DIAGNOSIS — F41.1 GAD (GENERALIZED ANXIETY DISORDER): ICD-10-CM

## 2022-01-26 DIAGNOSIS — E78.2 MIXED HYPERLIPIDEMIA: ICD-10-CM

## 2022-01-26 DIAGNOSIS — Z12.31 VISIT FOR SCREENING MAMMOGRAM: ICD-10-CM

## 2022-01-26 DIAGNOSIS — M17.0 PRIMARY OSTEOARTHRITIS OF BOTH KNEES: ICD-10-CM

## 2022-01-26 PROCEDURE — 3074F SYST BP LT 130 MM HG: CPT | Mod: CPTII,S$GLB,, | Performed by: OBSTETRICS & GYNECOLOGY

## 2022-01-26 PROCEDURE — 1159F PR MEDICATION LIST DOCUMENTED IN MEDICAL RECORD: ICD-10-PCS | Mod: CPTII,S$GLB,, | Performed by: OBSTETRICS & GYNECOLOGY

## 2022-01-26 PROCEDURE — 3008F BODY MASS INDEX DOCD: CPT | Mod: CPTII,S$GLB,, | Performed by: OBSTETRICS & GYNECOLOGY

## 2022-01-26 PROCEDURE — 99999 PR PBB SHADOW E&M-EST. PATIENT-LVL III: CPT | Mod: PBBFAC,,, | Performed by: OBSTETRICS & GYNECOLOGY

## 2022-01-26 PROCEDURE — 87624 HPV HI-RISK TYP POOLED RSLT: CPT | Performed by: OBSTETRICS & GYNECOLOGY

## 2022-01-26 PROCEDURE — 3008F PR BODY MASS INDEX (BMI) DOCUMENTED: ICD-10-PCS | Mod: CPTII,S$GLB,, | Performed by: OBSTETRICS & GYNECOLOGY

## 2022-01-26 PROCEDURE — 88175 CYTOPATH C/V AUTO FLUID REDO: CPT | Performed by: OBSTETRICS & GYNECOLOGY

## 2022-01-26 PROCEDURE — 99396 PREV VISIT EST AGE 40-64: CPT | Mod: S$GLB,,, | Performed by: OBSTETRICS & GYNECOLOGY

## 2022-01-26 PROCEDURE — 1159F MED LIST DOCD IN RCRD: CPT | Mod: CPTII,S$GLB,, | Performed by: OBSTETRICS & GYNECOLOGY

## 2022-01-26 PROCEDURE — 1160F RVW MEDS BY RX/DR IN RCRD: CPT | Mod: CPTII,S$GLB,, | Performed by: OBSTETRICS & GYNECOLOGY

## 2022-01-26 PROCEDURE — 99396 PR PREVENTIVE VISIT,EST,40-64: ICD-10-PCS | Mod: S$GLB,,, | Performed by: OBSTETRICS & GYNECOLOGY

## 2022-01-26 PROCEDURE — 99999 PR PBB SHADOW E&M-EST. PATIENT-LVL III: ICD-10-PCS | Mod: PBBFAC,,, | Performed by: OBSTETRICS & GYNECOLOGY

## 2022-01-26 PROCEDURE — 3078F PR MOST RECENT DIASTOLIC BLOOD PRESSURE < 80 MM HG: ICD-10-PCS | Mod: CPTII,S$GLB,, | Performed by: OBSTETRICS & GYNECOLOGY

## 2022-01-26 PROCEDURE — 3074F PR MOST RECENT SYSTOLIC BLOOD PRESSURE < 130 MM HG: ICD-10-PCS | Mod: CPTII,S$GLB,, | Performed by: OBSTETRICS & GYNECOLOGY

## 2022-01-26 PROCEDURE — 3078F DIAST BP <80 MM HG: CPT | Mod: CPTII,S$GLB,, | Performed by: OBSTETRICS & GYNECOLOGY

## 2022-01-26 PROCEDURE — 1160F PR REVIEW ALL MEDS BY PRESCRIBER/CLIN PHARMACIST DOCUMENTED: ICD-10-PCS | Mod: CPTII,S$GLB,, | Performed by: OBSTETRICS & GYNECOLOGY

## 2022-01-26 RX ORDER — AMOXICILLIN 875 MG/1
875 TABLET, FILM COATED ORAL 2 TIMES DAILY
COMMUNITY
Start: 2021-11-11 | End: 2022-05-26

## 2022-01-26 RX ORDER — PHENAZOPYRIDINE HYDROCHLORIDE 200 MG/1
200 TABLET, FILM COATED ORAL 3 TIMES DAILY
COMMUNITY
Start: 2021-11-02 | End: 2022-05-26

## 2022-01-26 RX ORDER — NITROFURANTOIN 25; 75 MG/1; MG/1
100 CAPSULE ORAL 2 TIMES DAILY
COMMUNITY
Start: 2021-11-02 | End: 2022-05-26

## 2022-01-26 NOTE — PROGRESS NOTES
HISTORY OF PRESENT ILLNESS:    Harpreet Peralta is a 51 y.o. female, , No LMP recorded (lmp unknown).,  presents for a routine exam and has no complaints.  Patient reports cycles occur every 1-4 months lasting 2-3 days using 3 pads per day.    She also reports no clotting and significant cramping. She denies any BTB.   Hot flashes for one year, occasional night sweats , no mood swings. No Vaginal dryness, no hair thinning.     Past Medical History:   Diagnosis Date    Anxiety     Depression     Urinary tract infection 2013    Staphylococcus saprophyticus       Past Surgical History:   Procedure Laterality Date    COLONOSCOPY N/A 2018    Procedure: COLONOSCOPY;  Surgeon: Young Pink MD;  Location: [address]);  Service: Endoscopy;  Laterality: N/A;       MEDICATIONS AND ALLERGIES:      Current Outpatient Medications:     amoxicillin (AMOXIL) 875 MG tablet, Take 875 mg by mouth 2 (two) times daily., Disp: , Rfl:     nitrofurantoin, macrocrystal-monohydrate, (MACROBID) 100 MG capsule, Take 100 mg by mouth 2 (two) times daily., Disp: , Rfl:     phenazopyridine (PYRIDIUM) 200 MG tablet, Take 200 mg by mouth 3 (three) times daily., Disp: , Rfl:     citalopram (CELEXA) 40 MG tablet, Take 1 tablet (40 mg total) by mouth once daily., Disp: 90 tablet, Rfl: 3    Review of patient's allergies indicates:  No Known Allergies    Family History   Problem Relation Age of Onset    Breast cancer Maternal Uncle 60    Breast cancer Paternal Aunt 70    Colon cancer Neg Hx     Ovarian cancer Neg Hx        Social History     Socioeconomic History    Marital status:    Tobacco Use    Smoking status: Never Smoker    Smokeless tobacco: Never Used   Substance and Sexual Activity    Alcohol use: Yes     Alcohol/week: 1.0 standard drink     Types: 1 Glasses of wine per week     Comment: socially    Drug use: No    Sexual activity: Yes     Partners: Male   Social History Narrative    2018     Newly wed in 2016    Gardens and cook together    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, safe from take overs, happy with job    No children     Social Determinants of Health     Financial Resource Strain: Low Risk     Difficulty of Paying Living Expenses: Not hard at all   Food Insecurity: No Food Insecurity    Worried About Running Out of Food in the Last Year: Never true    Ran Out of Food in the Last Year: Never true   Transportation Needs: No Transportation Needs    Lack of Transportation (Medical): No    Lack of Transportation (Non-Medical): No   Physical Activity: Insufficiently Active    Days of Exercise per Week: 4 days    Minutes of Exercise per Session: 30 min   Stress: Stress Concern Present    Feeling of Stress : To some extent   Social Connections: Unknown    Frequency of Communication with Friends and Family: Once a week    Frequency of Social Gatherings with Friends and Family: Once a week    Active Member of Clubs or Organizations: No    Attends Club or Organization Meetings: Never    Marital Status:        COMPREHENSIVE GYN HISTORY:  PAP History: Denies abnormal Paps.  Infection History: Denies STDs. Denies PID.  Benign History: Denies uterine fibroids. Denies ovarian cysts. Denies endometriosis. Denies other conditions.  Cancer History: Denies cervical cancer. Denies uterine cancer or hyperplasia. Denies ovarian cancer. Denies vulvar cancer or pre-cancer. Denies vaginal cancer or pre-cancer. Denies breast cancer. Denies colon cancer.  Sexual Activity History: Reports currently being sexually active  Menstrual History: Monthly. Mod then light flow.   Dysmenorrhea History: Reports mild dysmenorrhea.       ROS:  GENERAL: No weight changes. No swelling. No fatigue. No fever.  CARDIOVASCULAR: No chest pain. No shortness of breath. No leg cramps.   NEUROLOGICAL: No headaches. No vision changes.  BREASTS: No pain. No lumps. No discharge.  ABDOMEN: No  "pain. No nausea. No vomiting. No diarrhea. No constipation.  REPRODUCTIVE: No abnormal bleeding.   VULVA: No pain. No lesions. No itching.  VAGINA: No relaxation. No itching. No odor. No discharge. No lesions.  URINARY: No incontinence. No nocturia. No frequency. No dysuria.    /62   Ht 5' 11" (1.803 m)   Wt 97.8 kg (215 lb 9.8 oz)   LMP  (LMP Unknown)   BMI 30.07 kg/m²     PE:  APPEARANCE: Well nourished, well developed, in no acute distress.  AFFECT: WNL, alert and oriented x 3.  SKIN: No acne or hirsutism.  NECK: Neck symmetric, without masses or thyromegaly.  NODES: No inguinal, cervical, axillary or femoral lymph node enlargement.  CHEST: Good respiratory effort.   ABDOMEN: Soft. No tenderness or masses. No hepatosplenomegaly. No hernias.  BREASTS: Symmetrical, no skin changes, visible lesions, palpable masses or nipple discharge bilaterally.  PELVIC: External female genitalia without lesions.  Female hair distribution. Adequate perineal body, Normal urethral meatus. Vagina moist and well rugated without lesions or discharge.  No significant cystocele or rectocele present. Cervix pink without lesions, discharge or tenderness. Uterus is 4-6 week size, regular, mobile and nontender. Adnexa without masses or tenderness.  EXTREMITIES: No edema    DIAGNOSIS:  1. Encounter for gynecological examination without abnormal finding    2. Visit for screening mammogram    3. SANDHYA (generalized anxiety disorder)    4. Mixed hyperlipidemia    5. Primary osteoarthritis of both knees        PLAN:     COUNSELING:  The patient was counseled today on:  -A.C.S. Pap and pelvic exam guidelines (pap every 3 years), recomendations for yearly mammogram;  -to follow up with her PCP for other health maintenance.    A full discussion of the benefit-risk ratio of hormonal replacement therapy was carried out. Improvement in vasomotor and other climacteric symptoms is discussed, including possible improvements in sleep and mood. " Reduction of risk for osteoporosis was explained. We discussed the study data showing increased risk of thrombo-embolic events such as myocardial infarction, stroke and also possibly breast cancer with estrogen replacement, and how this might affect her. The range of side effects such as breast tenderness, weight gain and including possible increases in lifetime risk of breast cancer and possible thrombotic complications was discussed. We also discussed ACOG's recommendation to use hormone replacement therapy for the relief of hot flashes alone and to be on the lowest dose possible for the shortest amount of time.  Alternative such as herbal and soy-based products were reviewed. All of her questions about this therapy were answered.      FOLLOW-UP with me in 3 months   [place], Soy milk, Chillow pillow

## 2022-01-26 NOTE — TELEPHONE ENCOUNTER
----- Message from Nayely Walton sent at 1/26/2022  8:18 AM CST -----  Name of Who is Calling: YISEL OCONNOR          What is the request in detail: The patient is calling to inform the office that she will be 5-10 minutes late. Please advise          Can the clinic reply by [facility]: Yes         What Number to Call Back if not in [facility]: [phone]

## 2022-01-29 ENCOUNTER — IMMUNIZATION (OUTPATIENT)
Dept: PRIMARY CARE CLINIC | Facility: CLINIC | Age: 52
End: 2022-01-29
Payer: COMMERCIAL

## 2022-01-29 DIAGNOSIS — Z23 NEED FOR VACCINATION: Primary | ICD-10-CM

## 2022-01-29 PROCEDURE — 0034A COVID-19,VECTOR-NR,RS-AD26,PF,0.5 ML DOSE VACCINE (JANSSEN): CPT | Mod: CV19,PBBFAC | Performed by: INTERNAL MEDICINE

## 2022-01-31 LAB
HPV HR 12 DNA SPEC QL NAA+PROBE: NEGATIVE
HPV16 AG SPEC QL: NEGATIVE
HPV18 DNA SPEC QL NAA+PROBE: NEGATIVE

## 2022-02-01 LAB
FINAL PATHOLOGIC DIAGNOSIS: NORMAL
Lab: NORMAL

## 2022-02-21 ENCOUNTER — PATIENT MESSAGE (OUTPATIENT)
Dept: RADIOLOGY | Facility: HOSPITAL | Age: 52
End: 2022-02-21
Payer: COMMERCIAL

## 2022-05-26 ENCOUNTER — PATIENT MESSAGE (OUTPATIENT)
Dept: FAMILY MEDICINE | Facility: CLINIC | Age: 52
End: 2022-05-26
Payer: COMMERCIAL

## 2022-05-26 ENCOUNTER — PATIENT MESSAGE (OUTPATIENT)
Dept: INTERNAL MEDICINE | Facility: CLINIC | Age: 52
End: 2022-05-26
Payer: COMMERCIAL

## 2022-05-26 ENCOUNTER — OFFICE VISIT (OUTPATIENT)
Dept: FAMILY MEDICINE | Facility: CLINIC | Age: 52
End: 2022-05-26
Payer: COMMERCIAL

## 2022-05-26 DIAGNOSIS — R11.0 NAUSEA: ICD-10-CM

## 2022-05-26 DIAGNOSIS — K52.9 ACUTE GASTROENTERITIS: Primary | ICD-10-CM

## 2022-05-26 PROCEDURE — 1160F RVW MEDS BY RX/DR IN RCRD: CPT | Mod: CPTII,95,, | Performed by: NURSE PRACTITIONER

## 2022-05-26 PROCEDURE — 1159F PR MEDICATION LIST DOCUMENTED IN MEDICAL RECORD: ICD-10-PCS | Mod: CPTII,95,, | Performed by: NURSE PRACTITIONER

## 2022-05-26 PROCEDURE — 1160F PR REVIEW ALL MEDS BY PRESCRIBER/CLIN PHARMACIST DOCUMENTED: ICD-10-PCS | Mod: CPTII,95,, | Performed by: NURSE PRACTITIONER

## 2022-05-26 PROCEDURE — 1159F MED LIST DOCD IN RCRD: CPT | Mod: CPTII,95,, | Performed by: NURSE PRACTITIONER

## 2022-05-26 PROCEDURE — 99213 OFFICE O/P EST LOW 20 MIN: CPT | Mod: 95,,, | Performed by: NURSE PRACTITIONER

## 2022-05-26 PROCEDURE — 99213 PR OFFICE/OUTPT VISIT, EST, LEVL III, 20-29 MIN: ICD-10-PCS | Mod: 95,,, | Performed by: NURSE PRACTITIONER

## 2022-05-26 RX ORDER — PANTOPRAZOLE SODIUM 40 MG/1
40 TABLET, DELAYED RELEASE ORAL DAILY
Qty: 30 TABLET | Refills: 0 | Status: SHIPPED | OUTPATIENT
Start: 2022-05-26 | End: 2022-05-28

## 2022-05-26 RX ORDER — ONDANSETRON 4 MG/1
4 TABLET, FILM COATED ORAL EVERY 8 HOURS PRN
COMMUNITY
Start: 2022-05-25 | End: 2022-05-28

## 2022-05-27 NOTE — PROGRESS NOTES
Subjective:       Patient ID: Harpreet Peralta is a 52 y.o. female.    Chief Complaint: Fever, Nausea, and Headache    HPI    The patient location is: [place], [place]  The chief complaint leading to consultation is: nausea, fever, headache    Visit type: audiovisual    Face to Face time with patient: 15   15 minutes of total time spent on the encounter, which includes face to face time and non-face to face time preparing to see the patient (eg, review of tests), Obtaining and/or reviewing separately obtained history, Documenting clinical information in the electronic or other health record, Independently interpreting results (not separately reported) and communicating results to the patient/family/caregiver, or Care coordination (not separately reported).     Each patient to whom he or she provides medical services by telemedicine is:  (1) informed of the relationship between the physician and patient and the respective role of any other health care provider with respect to management of the patient; and (2) notified that he or she may decline to receive medical services by telemedicine and may withdraw from such care at any time.    Notes:     Patient is a 52 year old female with complaint of fever, chills, nausea and headache since Monday night 5/23/2022.    Patient has had 2 Urgent Care visits on 5/25/22 and 5/26/22.  She was tested twice for Covid and was negative.  She also had urinalysis at  that was negative. She reports she started on Monday evening with chills and low-grade fever.  She then woke up with nausea and headaches.  She has NO abdominal pains present.  She was evaluated twice in the urgent care with unremarkable exam based on progress notes.  She is prescribed Zofran for the nausea.  She reports she has only been taking Tylenol for the fever, no ibuprofen.  She is holding down liquids but states she has not been drinking a lot due to the nausea.    Review of Systems   Constitutional: Positive for chills  and fever. Negative for activity change and unexpected weight change.   HENT: Negative for hearing loss, rhinorrhea and trouble swallowing.    Eyes: Negative for discharge and visual disturbance.   Respiratory: Negative for chest tightness and wheezing.    Cardiovascular: Negative for chest pain and palpitations.   Gastrointestinal: Positive for nausea and vomiting. Negative for blood in stool, constipation and diarrhea.   Endocrine: Negative for polydipsia and polyuria.   Genitourinary: Negative for difficulty urinating, dysuria, hematuria and menstrual problem.   Musculoskeletal: Negative for arthralgias, joint swelling and neck pain.   Neurological: Positive for weakness and headaches.   Psychiatric/Behavioral: Negative for confusion and dysphoric mood.         Objective:     There were no vitals filed for this visit.       Physical Exam  Constitutional:       General: She is not in acute distress.     Appearance: She is well-developed. She is not diaphoretic.   HENT:      Head: Normocephalic and atraumatic.   Eyes:      General: No scleral icterus.        Right eye: No discharge.         Left eye: No discharge.      Conjunctiva/sclera: Conjunctivae normal.      Pupils: Pupils are equal, round, and reactive to light.   Pulmonary:      Effort: Pulmonary effort is normal. No respiratory distress.   Neurological:      Mental Status: She is alert and oriented to person, place, and time.   Psychiatric:         Behavior: Behavior normal.         Thought Content: Thought content normal.         Judgment: Judgment normal.           Assessment:         ICD-10-CM ICD-9-CM   1. Acute gastroenteritis  K52.9 558.9   2. Nausea  R11.0 787.02       Plan:       Acute gastroenteritis  Advised patient that since she is having no abdominal pains and she has had 2 physical exams at Urgent Care  - that did not report an acute abdomen findings - she should continue to treat symptoms.  She was only taking Tylenol for Fever - I  recommended that patient rotate tylenol and ibuprofen every 3 to 4 hours.  She is prescribed Zofran 4 mg for nausea - I advised patient that if ineffective in 40 minutes after taking - she can take another for a total of 8 mg in 8 hours.  Advised she needs to push fluids to stay hydrated - water, pedialyte, gatorade, soups, popsicles, etc.  Will prescribe Pantoprazole 40 mg for stomach protection as she will be taking NSAIDs as well.  IF worsening of symptoms, go to ER.  IF not improving by next week - see PCP.  -     pantoprazole (PROTONIX) 40 MG tablet; Take 1 tablet (40 mg total) by mouth once daily.  Dispense: 30 tablet; Refill: 0    Nausea      Follow up if symptoms worsen or fail to improve.     Patient's Medications   New Prescriptions    PANTOPRAZOLE (PROTONIX) 40 MG TABLET    Take 1 tablet (40 mg total) by mouth once daily.   Previous Medications    CITALOPRAM (CELEXA) 40 MG TABLET    Take 1 tablet (40 mg total) by mouth once daily.    ONDANSETRON (ZOFRAN) 4 MG TABLET    Take 4 mg by mouth every 8 (eight) hours as needed.   Modified Medications    No medications on file   Discontinued Medications    AMOXICILLIN (AMOXIL) 875 MG TABLET    Take 875 mg by mouth 2 (two) times daily.    NITROFURANTOIN, MACROCRYSTAL-MONOHYDRATE, (MACROBID) 100 MG CAPSULE    Take 100 mg by mouth 2 (two) times daily.    PHENAZOPYRIDINE (PYRIDIUM) 200 MG TABLET    Take 200 mg by mouth 3 (three) times daily.       Past Medical History:   Diagnosis Date    Anxiety     Depression     Kidney stones, age 16, 38 3/21/2018    Mixed hyperlipidemia 7/18/2019    Primary osteoarthritis of both knees 3/23/2021    Urinary tract infection 8/30/2013    Staphylococcus saprophyticus       Past Surgical History:   Procedure Laterality Date    COLONOSCOPY N/A 11/2/2018    Procedure: COLONOSCOPY;  Surgeon: Young Pink MD;  Location: [address]);  Service: Endoscopy;  Laterality: N/A;       Family History   Problem Relation Age of  Onset    Breast cancer Maternal Uncle 60    Breast cancer Paternal Aunt 70    Colon cancer Neg Hx     Ovarian cancer Neg Hx        Social History     Socioeconomic History    Marital status:    Tobacco Use    Smoking status: Never Smoker    Smokeless tobacco: Never Used   Substance and Sexual Activity    Alcohol use: Yes     Alcohol/week: 2.0 standard drinks     Types: 2 Glasses of wine per week     Comment: socially    Drug use: No    Sexual activity: Yes     Partners: Male     Birth control/protection: None     Comment: Essure procedure 13 yrs ago   Social History Narrative    2018    Newly wed in 2016    [employer] and cook together    In banking    [employer]    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, safe from take overs, happy with job    No children     Social Determinants of Health     Financial Resource Strain: Low Risk     Difficulty of Paying Living Expenses: Not hard at all   Food Insecurity: No Food Insecurity    Worried About Running Out of Food in the Last Year: Never true    Ran Out of Food in the Last Year: Never true   Transportation Needs: No Transportation Needs    Lack of Transportation (Medical): No    Lack of Transportation (Non-Medical): No   Physical Activity: Sufficiently Active    Days of Exercise per Week: 4 days    Minutes of Exercise per Session: 40 min   Stress: No Stress Concern Present    Feeling of Stress : Not at all   Social Connections: Unknown    Frequency of Communication with Friends and Family: Never    Frequency of Social Gatherings with Friends and Family: Never    Active Member of Clubs or Organizations: Yes    Attends Club or Organization Meetings: 1 to 4 times per year    Marital Status:    Housing Stability: Low Risk     Unable to Pay for Housing in the Last Year: No    Number of Places Lived in the Last Year: 1    Unstable Housing in the Last Year: No

## 2022-05-28 ENCOUNTER — HOSPITAL ENCOUNTER (INPATIENT)
Facility: HOSPITAL | Age: 52
LOS: 4 days | Discharge: HOME | DRG: 810 | End: 2022-06-01
Attending: EMERGENCY MEDICINE | Admitting: HOSPITALIST
Payer: COMMERCIAL

## 2022-05-28 DIAGNOSIS — K72.00 ACUTE LIVER FAILURE WITHOUT HEPATIC COMA: Primary | ICD-10-CM

## 2022-05-28 DIAGNOSIS — R50.9 FEVER: ICD-10-CM

## 2022-05-28 PROBLEM — D70.9 NEUTROPENIA: Status: ACTIVE | Noted: 2022-05-28

## 2022-05-28 PROBLEM — A41.9 SEPSIS: Status: ACTIVE | Noted: 2022-05-28

## 2022-05-28 PROBLEM — D61.818 PANCYTOPENIA: Status: ACTIVE | Noted: 2022-05-28

## 2022-05-28 PROBLEM — R94.5 ABNORMAL LIVER FUNCTION: Status: ACTIVE | Noted: 2022-05-28

## 2022-05-28 LAB
ALBUMIN SERPL BCP-MCNC: 3.5 G/DL (ref 3.5–5.2)
ALP SERPL-CCNC: 199 U/L (ref 55–135)
ALT SERPL W/O P-5'-P-CCNC: 353 U/L (ref 10–44)
ANION GAP SERPL CALC-SCNC: 11 MMOL/L (ref 8–16)
AST SERPL-CCNC: 304 U/L (ref 10–40)
BACTERIA #/AREA URNS HPF: ABNORMAL /HPF
BASOPHILS # BLD AUTO: 0.02 K/UL (ref 0–0.2)
BASOPHILS NFR BLD: 1.2 % (ref 0–1.9)
BILIRUB SERPL-MCNC: 2.2 MG/DL (ref 0.1–1)
BILIRUB UR QL STRIP: ABNORMAL
BUN SERPL-MCNC: 6 MG/DL (ref 6–20)
CALCIUM SERPL-MCNC: 9.4 MG/DL (ref 8.7–10.5)
CHLORIDE SERPL-SCNC: 101 MMOL/L (ref 95–110)
CLARITY UR: CLEAR
CO2 SERPL-SCNC: 23 MMOL/L (ref 23–29)
COLOR UR: YELLOW
CREAT SERPL-MCNC: 0.8 MG/DL (ref 0.5–1.4)
DIFFERENTIAL METHOD: ABNORMAL
EOSINOPHIL # BLD AUTO: 0 K/UL (ref 0–0.5)
EOSINOPHIL NFR BLD: 0.6 % (ref 0–8)
ERYTHROCYTE [DISTWIDTH] IN BLOOD BY AUTOMATED COUNT: 11.6 % (ref 11.5–14.5)
EST. GFR  (AFRICAN AMERICAN): >60 ML/MIN/1.73 M^2
EST. GFR  (NON AFRICAN AMERICAN): >60 ML/MIN/1.73 M^2
ETHANOL SERPL-MCNC: <10 MG/DL
GGT SERPL-CCNC: 328 U/L (ref 8–55)
GLUCOSE SERPL-MCNC: 105 MG/DL (ref 70–110)
GLUCOSE UR QL STRIP: NEGATIVE
HCT VFR BLD AUTO: 35.2 % (ref 37–48.5)
HGB BLD-MCNC: 12.2 G/DL (ref 12–16)
HGB UR QL STRIP: ABNORMAL
HYALINE CASTS #/AREA URNS LPF: 0 /LPF
IMM GRANULOCYTES # BLD AUTO: 0.01 K/UL (ref 0–0.04)
IMM GRANULOCYTES NFR BLD AUTO: 0.6 % (ref 0–0.5)
INFLUENZA A, MOLECULAR: NEGATIVE
INFLUENZA B, MOLECULAR: NEGATIVE
KETONES UR QL STRIP: NEGATIVE
LACTATE SERPL-SCNC: 0.7 MMOL/L (ref 0.5–2.2)
LACTATE SERPL-SCNC: 1.2 MMOL/L (ref 0.5–2.2)
LEUKOCYTE ESTERASE UR QL STRIP: NEGATIVE
LIPASE SERPL-CCNC: 15 U/L (ref 4–60)
LYMPHOCYTES # BLD AUTO: 0.5 K/UL (ref 1–4.8)
LYMPHOCYTES NFR BLD: 29.9 % (ref 18–48)
MCH RBC QN AUTO: 32.7 PG (ref 27–31)
MCHC RBC AUTO-ENTMCNC: 34.7 G/DL (ref 32–36)
MCV RBC AUTO: 94 FL (ref 82–98)
MICROSCOPIC COMMENT: ABNORMAL
MONOCYTES # BLD AUTO: 0.2 K/UL (ref 0.3–1)
MONOCYTES NFR BLD: 9 % (ref 4–15)
NEUTROPHILS # BLD AUTO: 1 K/UL (ref 1.8–7.7)
NEUTROPHILS NFR BLD: 58.7 % (ref 38–73)
NITRITE UR QL STRIP: NEGATIVE
NRBC BLD-RTO: 0 /100 WBC
PH UR STRIP: 8 [PH] (ref 5–8)
PLATELET # BLD AUTO: 139 K/UL (ref 150–450)
PLATELET BLD QL SMEAR: ABNORMAL
PMV BLD AUTO: 9.7 FL (ref 9.2–12.9)
POTASSIUM SERPL-SCNC: 3.9 MMOL/L (ref 3.5–5.1)
PROCALCITONIN SERPL IA-MCNC: 0.1 NG/ML
PROT SERPL-MCNC: 7.1 G/DL (ref 6–8.4)
PROT UR QL STRIP: ABNORMAL
RBC # BLD AUTO: 3.73 M/UL (ref 4–5.4)
RBC #/AREA URNS HPF: 11 /HPF (ref 0–4)
SARS-COV-2 RDRP RESP QL NAA+PROBE: NEGATIVE
SODIUM SERPL-SCNC: 135 MMOL/L (ref 136–145)
SP GR UR STRIP: 1.02 (ref 1–1.03)
SPECIMEN SOURCE: NORMAL
SQUAMOUS #/AREA URNS HPF: 3 /HPF
URN SPEC COLLECT METH UR: ABNORMAL
UROBILINOGEN UR STRIP-ACNC: >=8 EU/DL
WBC # BLD AUTO: 1.67 K/UL (ref 3.9–12.7)
WBC #/AREA URNS HPF: 2 /HPF (ref 0–5)

## 2022-05-28 PROCEDURE — 25000003 PHARM REV CODE 250: Performed by: EMERGENCY MEDICINE

## 2022-05-28 PROCEDURE — 96361 HYDRATE IV INFUSION ADD-ON: CPT | Mod: 59

## 2022-05-28 PROCEDURE — 99285 EMERGENCY DEPT VISIT HI MDM: CPT | Mod: 25

## 2022-05-28 PROCEDURE — 63600175 PHARM REV CODE 636 W HCPCS: Performed by: EMERGENCY MEDICINE

## 2022-05-28 PROCEDURE — 96375 TX/PRO/DX INJ NEW DRUG ADDON: CPT | Mod: 59

## 2022-05-28 PROCEDURE — 80074 ACUTE HEPATITIS PANEL: CPT | Performed by: EMERGENCY MEDICINE

## 2022-05-28 PROCEDURE — 27000207 HC ISOLATION

## 2022-05-28 PROCEDURE — 25000003 PHARM REV CODE 250: Performed by: NURSE PRACTITIONER

## 2022-05-28 PROCEDURE — 94761 N-INVAS EAR/PLS OXIMETRY MLT: CPT

## 2022-05-28 PROCEDURE — 87502 INFLUENZA DNA AMP PROBE: CPT | Performed by: EMERGENCY MEDICINE

## 2022-05-28 PROCEDURE — U0002 COVID-19 LAB TEST NON-CDC: HCPCS | Performed by: EMERGENCY MEDICINE

## 2022-05-28 PROCEDURE — 82977 ASSAY OF GGT: CPT | Performed by: EMERGENCY MEDICINE

## 2022-05-28 PROCEDURE — 81000 URINALYSIS NONAUTO W/SCOPE: CPT | Performed by: EMERGENCY MEDICINE

## 2022-05-28 PROCEDURE — 96365 THER/PROPH/DIAG IV INF INIT: CPT

## 2022-05-28 PROCEDURE — 80053 COMPREHEN METABOLIC PANEL: CPT | Performed by: EMERGENCY MEDICINE

## 2022-05-28 PROCEDURE — 84145 PROCALCITONIN (PCT): CPT | Performed by: EMERGENCY MEDICINE

## 2022-05-28 PROCEDURE — 82077 ASSAY SPEC XCP UR&BREATH IA: CPT | Performed by: NURSE PRACTITIONER

## 2022-05-28 PROCEDURE — 85027 COMPLETE CBC AUTOMATED: CPT | Performed by: EMERGENCY MEDICINE

## 2022-05-28 PROCEDURE — 83605 ASSAY OF LACTIC ACID: CPT | Mod: 91 | Performed by: HOSPITALIST

## 2022-05-28 PROCEDURE — 83690 ASSAY OF LIPASE: CPT | Performed by: EMERGENCY MEDICINE

## 2022-05-28 PROCEDURE — 87040 BLOOD CULTURE FOR BACTERIA: CPT | Performed by: EMERGENCY MEDICINE

## 2022-05-28 PROCEDURE — 11000001 HC ACUTE MED/SURG PRIVATE ROOM

## 2022-05-28 PROCEDURE — 83605 ASSAY OF LACTIC ACID: CPT | Performed by: EMERGENCY MEDICINE

## 2022-05-28 PROCEDURE — 87389 HIV-1 AG W/HIV-1&-2 AB AG IA: CPT | Performed by: HOSPITALIST

## 2022-05-28 PROCEDURE — 85007 BL SMEAR W/DIFF WBC COUNT: CPT | Performed by: EMERGENCY MEDICINE

## 2022-05-28 RX ORDER — SODIUM CHLORIDE 0.9 % (FLUSH) 0.9 %
10 SYRINGE (ML) INJECTION
Status: DISCONTINUED | OUTPATIENT
Start: 2022-05-28 | End: 2022-06-01 | Stop reason: HOSPADM

## 2022-05-28 RX ORDER — FAMOTIDINE 20 MG/1
20 TABLET, FILM COATED ORAL 2 TIMES DAILY
Status: DISCONTINUED | OUTPATIENT
Start: 2022-05-28 | End: 2022-06-01 | Stop reason: HOSPADM

## 2022-05-28 RX ORDER — ONDANSETRON 2 MG/ML
4 INJECTION INTRAMUSCULAR; INTRAVENOUS
Status: COMPLETED | OUTPATIENT
Start: 2022-05-28 | End: 2022-05-28

## 2022-05-28 RX ORDER — PROCHLORPERAZINE EDISYLATE 5 MG/ML
10 INJECTION INTRAMUSCULAR; INTRAVENOUS
Status: COMPLETED | OUTPATIENT
Start: 2022-05-28 | End: 2022-05-28

## 2022-05-28 RX ORDER — OXYCODONE HYDROCHLORIDE 5 MG/1
10 TABLET ORAL EVERY 4 HOURS PRN
Status: DISCONTINUED | OUTPATIENT
Start: 2022-05-28 | End: 2022-05-28

## 2022-05-28 RX ORDER — HYDROCODONE BITARTRATE AND ACETAMINOPHEN 5; 325 MG/1; MG/1
1 TABLET ORAL EVERY 4 HOURS PRN
Status: DISCONTINUED | OUTPATIENT
Start: 2022-05-28 | End: 2022-05-28

## 2022-05-28 RX ORDER — ONDANSETRON 8 MG/1
8 TABLET, ORALLY DISINTEGRATING ORAL EVERY 8 HOURS PRN
Status: DISCONTINUED | OUTPATIENT
Start: 2022-05-28 | End: 2022-06-01 | Stop reason: HOSPADM

## 2022-05-28 RX ORDER — OXYCODONE HYDROCHLORIDE 5 MG/1
10 TABLET ORAL EVERY 4 HOURS PRN
Status: DISCONTINUED | OUTPATIENT
Start: 2022-05-28 | End: 2022-06-01 | Stop reason: HOSPADM

## 2022-05-28 RX ORDER — ACETAMINOPHEN 325 MG/1
650 TABLET ORAL EVERY 8 HOURS PRN
Status: DISCONTINUED | OUTPATIENT
Start: 2022-05-28 | End: 2022-05-28

## 2022-05-28 RX ORDER — TALC
6 POWDER (GRAM) TOPICAL NIGHTLY PRN
Status: DISCONTINUED | OUTPATIENT
Start: 2022-05-28 | End: 2022-06-01 | Stop reason: HOSPADM

## 2022-05-28 RX ORDER — MV,CA,MIN/IRON/FA/GUARANA/CAFF 18MG-0.4MG
1 TABLET ORAL DAILY
COMMUNITY

## 2022-05-28 RX ADMIN — PIPERACILLIN AND TAZOBACTAM 4.5 G: 4; .5 INJECTION, POWDER, LYOPHILIZED, FOR SOLUTION INTRAVENOUS; PARENTERAL at 09:05

## 2022-05-28 RX ADMIN — PROCHLORPERAZINE EDISYLATE 10 MG: 5 INJECTION INTRAMUSCULAR; INTRAVENOUS at 08:05

## 2022-05-28 RX ADMIN — FAMOTIDINE 20 MG: 20 TABLET, FILM COATED ORAL at 08:05

## 2022-05-28 RX ADMIN — ONDANSETRON 4 MG: 2 INJECTION INTRAMUSCULAR; INTRAVENOUS at 08:05

## 2022-05-28 RX ADMIN — FAMOTIDINE 20 MG: 20 TABLET, FILM COATED ORAL at 03:05

## 2022-05-28 RX ADMIN — SODIUM CHLORIDE 250 ML: 0.9 INJECTION, SOLUTION INTRAVENOUS at 08:05

## 2022-05-28 NOTE — ASSESSMENT & PLAN NOTE
WBC 1.6  No bands  Neutrophil 58%  No left shift  No obvious illness  Symptoms of nausea and fever are vague at best  No new medications  No immunosuppressant home meds  ANC calculates to ~ 1000 (moderate neutropenia)    -check HIV  -neutropenic precautions  -Consult ID to surveil need for empiric BS antibiotics for now +/- antifungal   -Daily CBC with diff with ANC calculation    Hopeful for identification of source soon

## 2022-05-28 NOTE — ASSESSMENT & PLAN NOTE
"She reports vague history of alcohol indulging (reports no abuse) years ago  Reports history of abnormal liver function and "fatty liver" years ago but again is vague in description and not provoking in responses when probed  Review of record does not support verbalizations  No bleeding  No bruising  She has been on Tylenol of unknown amount every 6 hours x 7 days    -avoid hepatotoxic agents including tylenol  -alcohol level, UDS  -PT/INR  -hepatitis panel  -consult GI  -CMP daily    "

## 2022-05-28 NOTE — PROGRESS NOTES
VN cued into room to complete admit assessment. VIP model introduced; VN working alongside bedside treatment team.  Plan of care reviewed with patient. Patient informed of fall risk, fall precautions, call light within reach, side rails x2 elevated. Patient notified to ask staff for assistance. Patient verbalized complete understanding. Time allowed for questions. Will continue to monitor and intervene as needed.          05/28/22 1704   Admission   Initial VN Admission Questions Complete   Communication Issues? None   Shift   Virtual Nurse - Patient Verbalized Approval Of Camera Use   Safety/Activity   Patient Rounds bed in low position;call light in patient/parent reach;visualized patient;clutter free environment maintained   Safety Promotion/Fall Prevention side rails raised x 2   Positioning   Body Position supine   Head of Bed (HOB) Positioning HOB at 30-45 degrees

## 2022-05-28 NOTE — SUBJECTIVE & OBJECTIVE
Past Medical History:   Diagnosis Date    Anxiety     Depression     Kidney stones, age 16, 38 3/21/2018    Mixed hyperlipidemia 7/18/2019    Primary osteoarthritis of both knees 3/23/2021    Urinary tract infection 8/30/2013    Staphylococcus saprophyticus       Past Surgical History:   Procedure Laterality Date    COLONOSCOPY N/A 11/2/2018    Procedure: COLONOSCOPY;  Surgeon: Young Pink MD;  Location: [address];  Service: Endoscopy;  Laterality: N/A;       Review of patient's allergies indicates:  No Known Allergies    No current facility-administered medications on file prior to encounter.     Current Outpatient Medications on File Prior to Encounter   Medication Sig    aspirin-acetaminophen-caffeine 250-250-65 mg (EXCEDRIN MIGRAINE) 250-250-65 mg per tablet Take 1 tablet by mouth every 6 (six) hours as needed for Pain.    citalopram (CELEXA) 40 MG tablet Take 1 tablet (40 mg total) by mouth once daily.    mv,Ca,min-iron-FA-guarana-caff (ONE-A-DAY WOMEN'S ACTIVE) 18 mg iron- 400 mcg-180 mg Tab Take 1 tablet by mouth once daily.    [DISCONTINUED] ondansetron (ZOFRAN) 4 MG tablet Take 4 mg by mouth every 8 (eight) hours as needed.    [DISCONTINUED] pantoprazole (PROTONIX) 40 MG tablet Take 1 tablet (40 mg total) by mouth once daily.     Family History       Problem Relation (Age of Onset)    Breast cancer Maternal Uncle (60), Paternal Aunt (70)    Hypertension Mother, Father          Tobacco Use    Smoking status: Never Smoker    Smokeless tobacco: Never Used   Substance and Sexual Activity    Alcohol use: Yes     Alcohol/week: 2.0 standard drinks     Types: 2 Glasses of wine per week     Comment: socially    Drug use: No    Sexual activity: Yes     Partners: Male     Birth control/protection: None     Comment: Essure procedure 13 yrs ago     Review of Systems   Constitutional:  Positive for fever. Negative for activity change, appetite change, chills, diaphoresis, fatigue and unexpected weight  change.   HENT: Negative.     Eyes: Negative.    Respiratory: Negative.     Cardiovascular: Negative.    Gastrointestinal:  Positive for constipation (chronic) and nausea. Negative for abdominal distention, abdominal pain, anal bleeding, blood in stool, diarrhea and vomiting.   Endocrine: Negative.    Genitourinary: Negative.    Musculoskeletal: Negative.    Skin: Negative.    Allergic/Immunologic: Negative.    Neurological: Negative.    Hematological: Negative.    Psychiatric/Behavioral:  Negative for self-injury, sleep disturbance and suicidal ideas. The patient is nervous/anxious.    Objective:     Vital Signs (Most Recent):  Temp: 99.2 °F (37.3 °C) (05/28/22 0639)  Pulse: 85 (05/28/22 1240)  Resp: 16 (05/28/22 0639)  BP: 128/76 (05/28/22 1001)  SpO2: 96 % (05/28/22 1240) Vital Signs (24h Range):  Temp:  [99.2 °F (37.3 °C)] 99.2 °F (37.3 °C)  Pulse:  [] 85  Resp:  [16] 16  SpO2:  [95 %-96 %] 96 %  BP: (128-146)/(76-89) 128/76     Weight: 95.3 kg (210 lb)  Body mass index is 29.29 kg/m².    Physical Exam  Constitutional:       General: She is not in acute distress.     Appearance: She is not ill-appearing, toxic-appearing or diaphoretic.      Comments: Mildly guarded during questioning, vague responses requiring probing, healthy appearing, good hygiene   HENT:      Head: Normocephalic and atraumatic.      Nose: Nose normal.      Mouth/Throat:      Mouth: Mucous membranes are moist.   Eyes:      General: Scleral icterus (very mild) present.      Extraocular Movements: Extraocular movements intact.      Pupils: Pupils are equal, round, and reactive to light.   Cardiovascular:      Rate and Rhythm: Normal rate and regular rhythm.      Pulses: Normal pulses.      Heart sounds: Normal heart sounds.   Pulmonary:      Effort: Pulmonary effort is normal.      Breath sounds: Normal breath sounds.   Abdominal:      Palpations: Abdomen is soft.      Tenderness: There is no abdominal tenderness. There is no guarding or  rebound.      Hernia: No hernia is present.   Genitourinary:     Comments: deferred  Musculoskeletal:         General: Normal range of motion.      Cervical back: Normal range of motion and neck supple.   Skin:     General: Skin is warm and dry.      Capillary Refill: Capillary refill takes less than 2 seconds.   Neurological:      General: No focal deficit present.      Mental Status: She is alert and oriented to person, place, and time.   Psychiatric:      Comments: See constitutional         CRANIAL NERVES     CN III, IV, VI   Pupils are equal, round, and reactive to light.     Significant Labs: All pertinent labs within the past 24 hours have been reviewed.  CBC:   Recent Labs   Lab 05/28/22 0743   WBC 1.67*   HGB 12.2   HCT 35.2*   *     CMP:   Recent Labs   Lab 05/28/22 0743   *   K 3.9      CO2 23      BUN 6   CREATININE 0.8   CALCIUM 9.4   PROT 7.1   ALBUMIN 3.5   BILITOT 2.2*   ALKPHOS 199*   *   *   ANIONGAP 11   EGFRNONAA >60       Lactic Acid:   Recent Labs   Lab 05/28/22 0743 05/28/22  1238   LACTATE 1.2 0.7       Urine Studies:   Recent Labs   Lab 05/28/22  0726   COLORU Yellow   APPEARANCEUA Clear   PHUR 8.0   SPECGRAV 1.020   PROTEINUA 2+*   GLUCUA Negative   KETONESU Negative   BILIRUBINUA 1+*   OCCULTUA 1+*   NITRITE Negative   UROBILINOGEN >=8.0*   LEUKOCYTESUR Negative   RBCUA 11*   WBCUA 2   BACTERIA Rare   SQUAMEPITHEL 3   HYALINECASTS 0     Microbiology Results (last 7 days)       Procedure Component Value Units Date/Time    Influenza A & B by Molecular [[id number]] Collected: 05/28/22 0739    Order Status: Completed Specimen: Nasopharyngeal Swab Updated: 05/28/22 0853     Influenza A, Molecular Negative     Influenza B, Molecular Negative     Flu A & B Source Nasal swab    Blood culture x two cultures. Draw prior to antibiotics. [[id number]] Collected: 05/28/22 0742    Order Status: Sent Specimen: Blood from Peripheral, Antecubital, Left Updated:  05/28/22 0850    Blood culture x two cultures. Draw prior to antibiotics. [[id number]] Collected: 05/28/22 0802    Order Status: Sent Specimen: Blood from Peripheral, Forearm, Right Updated: 05/28/22 0802            Significant Imaging: I have reviewed all pertinent imaging results/findings within the past 24 hours.  Imaging Results              US Abdomen Limited (Final result)  Result time 05/28/22 10:12:03      Final result by Reese Young Jr., MD (05/28/22 10:12:03)                   Impression:      Mild splenomegaly      Electronically signed by: Reese Mobley Jr  Date:    05/28/2022  Time:    10:12               Narrative:    EXAMINATION:  US ABDOMEN LIMITED    CLINICAL HISTORY:  elevated T bili, LFTs, fevers;    TECHNIQUE:  Limited ultrasound of the right upper quadrant of the abdomen (including pancreas, liver, gallbladder, common bile duct, and spleen) was performed.    COMPARISON:  None.    FINDINGS:  Liver: Normal in size . Homogeneous echotexture. No focal hepatic lesions.    Gallbladder: Unremarkable.    Biliary system: The common duct is normal caliber.  No intrahepatic ductal dilatation.    Spleen: Mildly enlarged measuring 14 cm.  Normal echotexture.    Pancreas: Unremarkable.                                       X-Ray Chest AP Portable (Final result)  Result time 05/28/22 09:11:32      Final result by Dunia Mares MD (05/28/22 09:11:32)                   Impression:      Clear lungs.      Electronically signed by: Dunia Mares MD  Date:    05/28/2022  Time:    09:11               Narrative:    EXAMINATION:  XR CHEST AP PORTABLE    CLINICAL HISTORY:  Sepsis;    TECHNIQUE:  Single frontal view of the chest was performed.    COMPARISON:  None    FINDINGS:  The mediastinal structures are midline.  The heart is not enlarged.  The lungs appear grossly clear.  No osseous abnormalities are seen.

## 2022-05-28 NOTE — HPI
"This is a pleasant 52 year old WF with a history of depression in remission on Celexa who presented to the ED with CC of a one week  history of acute onset nausea with associated daily fevers, frontal headache earlier in the week that has since resolved, & up to 102F not relieved with home alternating Tylenol and Ibuprofen every 3 hours. She explains awakening Monday with symptoms as above. She visited Urgent care who told her she likely had a "stomach virus" and to alternate Tylenol and Ibuprofen for fevers. She chatted with her PCP's colleague (PCP retired) who advised her to do the same. She visited with a virtual nurse via phone who also confirmed treatment as above. By this am, when symptoms were no better and fever was not breaking with alternating meds as below, she presented to the ED for further evaluation "something is not right". She denies abdominal pain, chest pain, cough, dyspnea, trauma, diarrhea, prolonged headaches, myalgias, neck pain, changes to bladder habits, previous episodes. She denies out of the country travel or sick contacts. 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 No abnormal bruising or bleeding. Denies new medications. She reports elevated liver enzymes years ago with her PCP who told her to stop her weekend drinking. She reports abnormal liver imaging at that time "my doctor said it was fat". Review of records does not show any of reported "years" prior liver function serum or imaging. Hepatic panel was normal one year ago. No pertinent FH. She was found in the ED to have pancytopenia with WBC of 1.6, platelets of 139,000 on CBC, ALk phos 199 (baseline 70's)  (baseline 26)  (baseline 33), normal procalcitonin, normal lactic acid, UA with proteinuria/occult blood/&RBCs. Abdominal US with mild splenomegaly (14 cms) with normal appearing liver and biliary system. ANC calculation ~ 1000. Blood cultures were " collected. ID consulted for neutropenia. Hepatitis panel requested. She was admitted to the medical surgical unit for evaluation of abnormal liver function with moderate neutropenia + pancytopenia. Awaiting ID and GI evaluation.

## 2022-05-28 NOTE — ASSESSMENT & PLAN NOTE
WBC 1.6; ANC calculates to ~ 1000 (moderate neutropenia)  Platelets 139,000  Mildly reduced RBC      No obvious illness  Symptoms of nausea and fever are vague at best  No new medications  No immunosuppressant home meds    -check HIV  -await hepatitis panel  -PT/INR  -May require hematology support if source is not readily identifiable

## 2022-05-28 NOTE — Clinical Note
Diagnosis: Fever [351127]   Future Attending Provider: BIJAN VILLARREAL [[id number]]   Admitting Provider:: BIJAN VILLARREAL [[id number]]

## 2022-05-28 NOTE — H&P
"[facility]  History & Physical    Patient Name: Harpreet Peralta  MRN: [id number]  Patient Class: IP- Inpatient  Admission Date: 5/28/2022  Attending Physician: Kody Pascal, *   Primary Care Provider: Katelin Guerra MD         Patient information was obtained from patient, past medical records and ER records.     Subjective:     Principal Problem:Pancytopenia    Chief Complaint:   Chief Complaint   Patient presents with    Fever     Patient presents to the ED with reports of having fevers. Reports over the past week have been seen in clinic and urgent for a "stomach virus". Symptom include nausea and vomiting.         HPI: This is a pleasant 52 year old WF with a history of depression in remission on Celexa who presented to the ED with CC of a one week  history of acute onset nausea with associated daily fevers, frontal headache earlier in the week that has since resolved, & up to 102F not relieved with home alternating Tylenol and Ibuprofen every 3 hours. She explains awakening Monday with symptoms as above. She visited Urgent care who told her she likely had a "stomach virus" and to alternate Tylenol and Ibuprofen for fevers. She chatted with her PCP's colleague (PCP retired) who advised her to do the same. She visited with a virtual nurse via phone who also confirmed treatment as above. By this am, when symptoms were no better and fever was not breaking with alternating meds as below, she presented to the ED for further evaluation "something is not right". She denies abdominal pain, chest pain, cough, dyspnea, trauma, diarrhea, prolonged headaches, myalgias, neck pain, changes to bladder habits, previous episodes. She denies out of the country travel or sick contacts. 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 No abnormal bruising or bleeding. Denies new medications. She reports elevated liver enzymes years ago with " "her PCP who told her to stop her weekend drinking. She reports abnormal liver imaging at that time "my doctor said it was fat". Review of records does not show any of reported "years" prior liver function serum or imaging. Hepatic panel was normal one year ago. No pertinent FH. She was found in the ED to have pancytopenia with WBC of 1.6, platelets of 139,000 on CBC, ALk phos 199 (baseline 70's)  (baseline 26)  (baseline 33), normal procalcitonin, normal lactic acid, UA with proteinuria/occult blood/&RBCs. Abdominal US with mild splenomegaly (14 cms) with normal appearing liver and biliary system. ANC calculation ~ 1000. Blood cultures were collected. ID consulted for neutropenia. Hepatitis panel requested. She was admitted to the medical surgical unit for evaluation of abnormal liver function with moderate neutropenia + pancytopenia. Awaiting ID and GI evaluation.       Past Medical History:   Diagnosis Date    Anxiety     Depression     Kidney stones, age 16, 38 3/21/2018    Mixed hyperlipidemia 7/18/2019    Primary osteoarthritis of both knees 3/23/2021    Urinary tract infection 8/30/2013    Staphylococcus saprophyticus       Past Surgical History:   Procedure Laterality Date    COLONOSCOPY N/A 11/2/2018    Procedure: COLONOSCOPY;  Surgeon: Young Pink MD;  Location: [facility] ([address] [place] [address]);  Service: Endoscopy;  Laterality: N/A;       Review of patient's allergies indicates:  No Known Allergies    No current facility-administered medications on file prior to encounter.     Current Outpatient Medications on File Prior to Encounter   Medication Sig    aspirin-acetaminophen-caffeine 250-250-65 mg (EXCEDRIN MIGRAINE) 250-250-65 mg per tablet Take 1 tablet by mouth every 6 (six) hours as needed for Pain.    citalopram (CELEXA) 40 MG tablet Take 1 tablet (40 mg total) by mouth once daily.    mv,Ca,min-iron-FA-guarana-caff (ONE-A-DAY WOMEN'S ACTIVE) 18 mg iron- 400 mcg-180 mg Tab Take 1 tablet " by mouth once daily.    [DISCONTINUED] ondansetron (ZOFRAN) 4 MG tablet Take 4 mg by mouth every 8 (eight) hours as needed.    [DISCONTINUED] pantoprazole (PROTONIX) 40 MG tablet Take 1 tablet (40 mg total) by mouth once daily.     Family History       Problem Relation (Age of Onset)    Breast cancer Maternal Uncle (60), Paternal Aunt (70)    Hypertension Mother, Father          Tobacco Use    Smoking status: Never Smoker    Smokeless tobacco: Never Used   Substance and Sexual Activity    Alcohol use: Yes     Alcohol/week: 2.0 standard drinks     Types: 2 Glasses of wine per week     Comment: socially    Drug use: No    Sexual activity: Yes     Partners: Male     Birth control/protection: None     Comment: Essure procedure 13 yrs ago     Review of Systems   Constitutional:  Positive for fever. Negative for activity change, appetite change, chills, diaphoresis, fatigue and unexpected weight change.   HENT: Negative.     Eyes: Negative.    Respiratory: Negative.     Cardiovascular: Negative.    Gastrointestinal:  Positive for constipation (chronic) and nausea. Negative for abdominal distention, abdominal pain, anal bleeding, blood in stool, diarrhea and vomiting.   Endocrine: Negative.    Genitourinary: Negative.    Musculoskeletal: Negative.    Skin: Negative.    Allergic/Immunologic: Negative.    Neurological: Negative.    Hematological: Negative.    Psychiatric/Behavioral:  Negative for self-injury, sleep disturbance and suicidal ideas. The patient is nervous/anxious.    Objective:     Vital Signs (Most Recent):  Temp: 99.2 °F (37.3 °C) (05/28/22 0639)  Pulse: 85 (05/28/22 1240)  Resp: 16 (05/28/22 0639)  BP: 128/76 (05/28/22 1001)  SpO2: 96 % (05/28/22 1240) Vital Signs (24h Range):  Temp:  [99.2 °F (37.3 °C)] 99.2 °F (37.3 °C)  Pulse:  [] 85  Resp:  [16] 16  SpO2:  [95 %-96 %] 96 %  BP: (128-146)/(76-89) 128/76     Weight: 95.3 kg (210 lb)  Body mass index is 29.29 kg/m².    Physical  Exam  Constitutional:       General: She is not in acute distress.     Appearance: She is not ill-appearing, toxic-appearing or diaphoretic.      Comments: Mildly guarded during questioning, vague responses requiring probing, healthy appearing, good hygiene   HENT:      Head: Normocephalic and atraumatic.      Nose: Nose normal.      Mouth/Throat:      Mouth: Mucous membranes are moist.   Eyes:      General: Scleral icterus (very mild) present.      Extraocular Movements: Extraocular movements intact.      Pupils: Pupils are equal, round, and reactive to light.   Cardiovascular:      Rate and Rhythm: Normal rate and regular rhythm.      Pulses: Normal pulses.      Heart sounds: Normal heart sounds.   Pulmonary:      Effort: Pulmonary effort is normal.      Breath sounds: Normal breath sounds.   Abdominal:      Palpations: Abdomen is soft.      Tenderness: There is no abdominal tenderness. There is no guarding or rebound.      Hernia: No hernia is present.   Genitourinary:     Comments: deferred  Musculoskeletal:         General: Normal range of motion.      Cervical back: Normal range of motion and neck supple.   Skin:     General: Skin is warm and dry.      Capillary Refill: Capillary refill takes less than 2 seconds.   Neurological:      General: No focal deficit present.      Mental Status: She is alert and oriented to person, place, and time.   Psychiatric:      Comments: See constitutional         CRANIAL NERVES     CN III, IV, VI   Pupils are equal, round, and reactive to light.     Significant Labs: All pertinent labs within the past 24 hours have been reviewed.  CBC:   Recent Labs   Lab 05/28/22  0743   WBC 1.67*   HGB 12.2   HCT 35.2*   *     CMP:   Recent Labs   Lab 05/28/22  0743   *   K 3.9      CO2 23      BUN 6   CREATININE 0.8   CALCIUM 9.4   PROT 7.1   ALBUMIN 3.5   BILITOT 2.2*   ALKPHOS 199*   *   *   ANIONGAP 11   EGFRNONAA >60       Lactic Acid:   Recent  Labs   Lab 05/28/22  0743 05/28/22  1238   LACTATE 1.2 0.7       Urine Studies:   Recent Labs   Lab 05/28/22  0726   COLORU Yellow   APPEARANCEUA Clear   PHUR 8.0   SPECGRAV 1.020   PROTEINUA 2+*   GLUCUA Negative   KETONESU Negative   BILIRUBINUA 1+*   OCCULTUA 1+*   NITRITE Negative   UROBILINOGEN >=8.0*   LEUKOCYTESUR Negative   RBCUA 11*   WBCUA 2   BACTERIA Rare   SQUAMEPITHEL 3   HYALINECASTS 0     Microbiology Results (last 7 days)       Procedure Component Value Units Date/Time    Influenza A & B by Molecular [[id number]] Collected: 05/28/22 0739    Order Status: Completed Specimen: Nasopharyngeal Swab Updated: 05/28/22 0853     Influenza A, Molecular Negative     Influenza B, Molecular Negative     Flu A & B Source Nasal swab    Blood culture x two cultures. Draw prior to antibiotics. [[id number]] Collected: 05/28/22 0742    Order Status: Sent Specimen: Blood from Peripheral, Antecubital, Left Updated: 05/28/22 0850    Blood culture x two cultures. Draw prior to antibiotics. [[id number]] Collected: 05/28/22 0802    Order Status: Sent Specimen: Blood from Peripheral, Forearm, Right Updated: 05/28/22 0802            Significant Imaging: I have reviewed all pertinent imaging results/findings within the past 24 hours.  Imaging Results              US Abdomen Limited (Final result)  Result time 05/28/22 10:12:03      Final result by Reese Young Jr., MD (05/28/22 10:12:03)                   Impression:      Mild splenomegaly      Electronically signed by: Reese Mobley Jr  Date:    05/28/2022  Time:    10:12               Narrative:    EXAMINATION:  US ABDOMEN LIMITED    CLINICAL HISTORY:  elevated T bili, LFTs, fevers;    TECHNIQUE:  Limited ultrasound of the right upper quadrant of the abdomen (including pancreas, liver, gallbladder, common bile duct, and spleen) was performed.    COMPARISON:  None.    FINDINGS:  Liver: Normal in size . Homogeneous echotexture. No focal hepatic lesions.    Gallbladder:  "Unremarkable.    Biliary system: The common duct is normal caliber.  No intrahepatic ductal dilatation.    Spleen: Mildly enlarged measuring 14 cm.  Normal echotexture.    Pancreas: Unremarkable.                                       X-Ray Chest AP Portable (Final result)  Result time 05/28/22 09:11:32      Final result by Dunia Mares MD (05/28/22 09:11:32)                   Impression:      Clear lungs.      Electronically signed by: Dunia Mares MD  Date:    05/28/2022  Time:    09:11               Narrative:    EXAMINATION:  XR CHEST AP PORTABLE    CLINICAL HISTORY:  Sepsis;    TECHNIQUE:  Single frontal view of the chest was performed.    COMPARISON:  None    FINDINGS:  The mediastinal structures are midline.  The heart is not enlarged.  The lungs appear grossly clear.  No osseous abnormalities are seen.                                        Assessment/Plan:     * Pancytopenia  WBC 1.6; ANC calculates to ~ 1000 (moderate neutropenia)  Platelets 139,000  Mildly reduced RBC      No obvious illness  Symptoms of nausea and fever are vague at best  No new medications  No immunosuppressant home meds    -check HIV  -await hepatitis panel  -PT/INR  -May require hematology support if source is not readily identifiable      Abnormal liver function  She reports vague history of alcohol indulging (reports no abuse) years ago  Reports history of abnormal liver function and "fatty liver" years ago but again is vague in description and not provoking in responses when probed  Review of record does not support verbalizations  No bleeding  No bruising  She has been on Tylenol of unknown amount every 6 hours x 7 days    -avoid hepatotoxic agents including tylenol  -alcohol level, UDS  -PT/INR  -hepatitis panel  -consult GI  -CMP daily      Neutropenia  WBC 1.6  No bands  Neutrophil 58%  No left shift  No obvious illness  Symptoms of nausea and fever are vague at best  No new medications  No immunosuppressant home " meds  ANC calculates to ~ 1000 (moderate neutropenia)    -check HIV  -neutropenic precautions  -Consult ID to surveil need for empiric BS antibiotics for now +/- antifungal   -Daily CBC with diff with ANC calculation    Hopeful for identification of source soon          VTE Risk Mitigation (From admission, onward)         Ordered     IP VTE LOW RISK PATIENT  Once         05/28/22 1152     Place MARIANNA hose  Until discontinued         05/28/22 1152                   Candelaria Stover NP  Department of Hospital Medicine   [place] - Emergency Dept

## 2022-05-28 NOTE — LETTER
June 1, 2022         [address] [place] [address] [place] [address]  Phone: [phone]  Fax: [phone]       Patient: Harpreet Peralta   YOB: 1970  Date of Visit: 06/01/2022    To Whom It May Concern:    Harpreet Peralta  was at [facility] on 06/01/2022. The patient may return to work/school on 06/06/2022 with no restrictions. If you have any questions or concerns, or if I can be of further assistance, please do not hesitate to contact me.    Sincerely,    Wing Nunez MD

## 2022-05-28 NOTE — PHARMACY MED REC
"Admission Medication History     The home medication history was taken by Lauren Tomas CPhT.    Medication history obtained from, Patient Verified    You may go to "Admission" then "Reconcile Home Medications" tabs to review and/or act upon these items.      The home medication list has been updated by the Pharmacy department.    Please read ALL comments highlighted in yellow.    Please address this information as you see fit.     Feel free to contact us if you have any questions or require assistance.      The medications listed below were removed from the home medication list.  Please reorder if appropriate:  Patient reports no longer taking the following medication(s):   Zofran 4 mg   Protonix 40 mg        Lauren Tomas CPhT.  Ext [phone]                .          "

## 2022-05-28 NOTE — ED PROVIDER NOTES
"Encounter Date: 5/28/2022    SCRIBE #1 NOTE: I, Natalie Olmos, am scribing for, and in the presence of,  Pari Lazar MD. . I have scribed the entire note.     I, Dr. Pari Lazar MD, personally performed the services described in this documentation. All medical record entries made by the scribe were at my direction and in my presence.  I have reviewed the chart and agree that the record reflects my personal performance and is accurate and complete. Pari Lazar MD.    History     Chief Complaint   Patient presents with    Fever     Patient presents to the ED with reports of having fevers. Reports over the past week have been seen in clinic and urgent for a "stomach virus". Symptom include nausea and vomiting.      Patient is a 51 y/o Female with a PMHx of  has a past medical history of Anxiety, Depression, Kidney stones, age 16, 38 (3/21/2018), Mixed hyperlipidemia (7/18/2019), Primary osteoarthritis of both knees (3/23/2021), and Urinary tract infection (8/30/2013). Who was recently diagnosed with Acute Gastroenteritis presenting to the ED for a fevers that have lasted for 6 days. Tmax of 102.  Associated Symptoms Include Nausea, at least on episode of vomiting a day, Right sided Headache that "feels like a Migraine", Fatigue and Decreased Appetite. Patient denies Myalgia, Cough, Sore Throat or any Diarrhea. Patient also reports her last vomit was yesterday (05/27) at 2000. Patient states taking Tylenol and Ibuprofen every three hours for the past six days which do help with but she is concerned bc her symptoms return. Patient states her last time taking medications was yesterday (05/27) at 2100.  No rashed, no neck stiffness.         Review of patient's allergies indicates:  No Known Allergies  Past Medical History:   Diagnosis Date    Anxiety     Depression     Kidney stones, age 16, 38 3/21/2018    Mixed hyperlipidemia 7/18/2019    Primary osteoarthritis of both knees 3/23/2021    Urinary tract " infection 8/30/2013    Staphylococcus saprophyticus     Past Surgical History:   Procedure Laterality Date    COLONOSCOPY N/A 11/2/2018    Procedure: COLONOSCOPY;  Surgeon: Young Pink MD;  Location: [address]);  Service: Endoscopy;  Laterality: N/A;     Family History   Problem Relation Age of Onset    Breast cancer Maternal Uncle 60    Breast cancer Paternal Aunt 70    Colon cancer Neg Hx     Ovarian cancer Neg Hx      Social History     Tobacco Use    Smoking status: Never Smoker    Smokeless tobacco: Never Used   Substance Use Topics    Alcohol use: Yes     Alcohol/week: 2.0 standard drinks     Types: 2 Glasses of wine per week     Comment: socially    Drug use: No     Review of Systems   Constitutional: Positive for appetite change, fatigue and fever. Negative for activity change, chills and diaphoresis.   HENT: Negative for congestion, drooling, ear pain, mouth sores, rhinorrhea, sinus pain, sore throat and trouble swallowing.    Eyes: Negative for pain and discharge.   Respiratory: Negative for cough, chest tightness, shortness of breath, wheezing and stridor.    Cardiovascular: Negative for chest pain, palpitations and leg swelling.   Gastrointestinal: Positive for nausea and vomiting. Negative for abdominal distention, abdominal pain, blood in stool, constipation and diarrhea.   Genitourinary: Negative for difficulty urinating, dysuria, flank pain, frequency, hematuria and urgency.   Musculoskeletal: Negative for arthralgias, back pain and myalgias.   Skin: Negative for pallor, rash and wound.   Neurological: Positive for headaches. Negative for dizziness, syncope, weakness, light-headedness and numbness.   All other systems reviewed and are negative.      Physical Exam     Initial Vitals [05/28/22 0639]   BP Pulse Resp Temp SpO2   138/89 108 16 99.2 °F (37.3 °C) 96 %      MAP       --         Physical Exam    Nursing note and vitals reviewed.  Constitutional: She appears  well-developed and well-nourished. She is not diaphoretic.   Her current temp is 99.2   HENT:   Head: Normocephalic and atraumatic.   Right Ear: External ear normal.   Left Ear: External ear normal.   Nose: Nose normal.   Mouth/Throat: Oropharynx is clear and moist.   Eyes: Conjunctivae and EOM are normal. Pupils are equal, round, and reactive to light. No scleral icterus.   Neck: Neck supple. No JVD present.   Normal range of motion.  Cardiovascular: Normal rate, regular rhythm, normal heart sounds and intact distal pulses. Exam reveals no gallop and no friction rub.    No murmur heard.  Pulmonary/Chest: Breath sounds normal. No stridor. No respiratory distress. She has no wheezes. She exhibits no tenderness.   Abdominal: Abdomen is soft. Bowel sounds are normal. She exhibits no distension and no mass. There is no abdominal tenderness. There is no rebound and no guarding.   Musculoskeletal:         General: No tenderness or edema. Normal range of motion.      Cervical back: Normal range of motion and neck supple.      Comments: Back is nontender to palpation.      Neurological: She is alert and oriented to person, place, and time. She has normal strength. No cranial nerve deficit.   Skin: Skin is warm and dry. Capillary refill takes less than 2 seconds. No rash noted. No pallor.   Psychiatric: She has a normal mood and affect. Thought content normal.         ED Course   Procedures  Labs Reviewed   CBC W/ AUTO DIFFERENTIAL - Abnormal; Notable for the following components:       Result Value    WBC 1.67 (*)     RBC 3.73 (*)     Hematocrit 35.2 (*)     MCH 32.7 (*)     Platelets 139 (*)     Immature Granulocytes 0.6 (*)     Gran # (ANC) 1.0 (*)     Lymph # 0.5 (*)     Mono # 0.2 (*)     Platelet Estimate Decreased (*)     All other components within normal limits    Narrative:     WBCIR critical result(s) called and verbal readback obtained from   Viktoriya Galdamez RN by JOBY 05/28/2022 08:42   COMPREHENSIVE METABOLIC PANEL  - Abnormal; Notable for the following components:    Sodium 135 (*)     Total Bilirubin 2.2 (*)     Alkaline Phosphatase 199 (*)      (*)      (*)     All other components within normal limits   URINALYSIS, REFLEX TO URINE CULTURE - Abnormal; Notable for the following components:    Protein, UA 2+ (*)     Bilirubin (UA) 1+ (*)     Occult Blood UA 1+ (*)     Urobilinogen, UA >=8.0 (*)     All other components within normal limits    Narrative:     Specimen Source->Urine   URINALYSIS MICROSCOPIC - Abnormal; Notable for the following components:    RBC, UA 11 (*)     All other components within normal limits    Narrative:     Specimen Source->Urine   GAMMA GT - Abnormal; Notable for the following components:     (*)     All other components within normal limits   INFLUENZA A & B BY MOLECULAR   CULTURE, BLOOD   CULTURE, BLOOD   LACTIC ACID, PLASMA   LIPASE   PROCALCITONIN   SARS-COV-2 RNA AMPLIFICATION, QUAL   HEPATITIS PANEL, ACUTE   GAMMA GT   RAPID HIV   HEPATITIS PANEL, ACUTE   HIV 1 / 2 ANTIBODY   LACTIC ACID, PLASMA          Imaging Results          US Abdomen Limited (Final result)  Result time 05/28/22 10:12:03    Final result by Reese Young Jr., MD (05/28/22 10:12:03)                 Impression:      Mild splenomegaly      Electronically signed by: Reese Mobley Jr  Date:    05/28/2022  Time:    10:12             Narrative:    EXAMINATION:  US ABDOMEN LIMITED    CLINICAL HISTORY:  elevated T bili, LFTs, fevers;    TECHNIQUE:  Limited ultrasound of the right upper quadrant of the abdomen (including pancreas, liver, gallbladder, common bile duct, and spleen) was performed.    COMPARISON:  None.    FINDINGS:  Liver: Normal in size . Homogeneous echotexture. No focal hepatic lesions.    Gallbladder: Unremarkable.    Biliary system: The common duct is normal caliber.  No intrahepatic ductal dilatation.    Spleen: Mildly enlarged measuring 14 cm.  Normal echotexture.    Pancreas:  Unremarkable.                               X-Ray Chest AP Portable (Final result)  Result time 05/28/22 09:11:32    Final result by Dunia Mares MD (05/28/22 09:11:32)                 Impression:      Clear lungs.      Electronically signed by: Dunia Mares MD  Date:    05/28/2022  Time:    09:11             Narrative:    EXAMINATION:  XR CHEST AP PORTABLE    CLINICAL HISTORY:  Sepsis;    TECHNIQUE:  Single frontal view of the chest was performed.    COMPARISON:  None    FINDINGS:  The mediastinal structures are midline.  The heart is not enlarged.  The lungs appear grossly clear.  No osseous abnormalities are seen.                                 Medications   sodium chloride 0.9% flush 10 mL (has no administration in time range)   melatonin tablet 6 mg (has no administration in time range)   famotidine tablet 20 mg (has no administration in time range)   ondansetron disintegrating tablet 8 mg (has no administration in time range)   oxyCODONE immediate release tablet 10 mg (has no administration in time range)   prochlorperazine injection Soln 10 mg (10 mg Intravenous Given 5/28/22 0801)   sodium chloride 0.9% bolus 250 mL (0 mLs Intravenous Stopped 5/28/22 0919)   ondansetron injection 4 mg (4 mg Intravenous Given 5/28/22 0801)   piperacillin-tazobactam 4.5 g in dextrose 5 % 100 mL IVPB (ready to mix system) (0 g Intravenous Stopped 5/28/22 1033)                 ED Course as of 05/28/22 1216   Sat May 28, 2022   0841 Leukocytes, UA: Negative [JA]   0842 NITRITE UA: Negative [JA]   0842 Procalcitonin: 0.10  Normal [JA]   1038 I spoke with Dr Pascal re:the pts presentation and workup, he agrees given fever, leukopenia and transaminatis appropriate for admission. He accepts for admission.  [JA]      ED Course User Index  [JA] Pari Lazar MD             Clinical Impression:   Final diagnoses:  [R50.9] Fever          ED Disposition Condition    Admit               Pari Lazar MD  05/28/22  1211

## 2022-05-29 LAB
ALBUMIN SERPL BCP-MCNC: 3.5 G/DL (ref 3.5–5.2)
ALP SERPL-CCNC: 228 U/L (ref 55–135)
ALT SERPL W/O P-5'-P-CCNC: 540 U/L (ref 10–44)
ANION GAP SERPL CALC-SCNC: 11 MMOL/L (ref 8–16)
AST SERPL-CCNC: 448 U/L (ref 10–40)
BASOPHILS # BLD AUTO: ABNORMAL K/UL (ref 0–0.2)
BASOPHILS NFR BLD: 0 % (ref 0–1.9)
BILIRUB SERPL-MCNC: 2 MG/DL (ref 0.1–1)
BUN SERPL-MCNC: 8 MG/DL (ref 6–20)
CALCIUM SERPL-MCNC: 9.4 MG/DL (ref 8.7–10.5)
CHLORIDE SERPL-SCNC: 102 MMOL/L (ref 95–110)
CO2 SERPL-SCNC: 24 MMOL/L (ref 23–29)
CREAT SERPL-MCNC: 0.8 MG/DL (ref 0.5–1.4)
DIFFERENTIAL METHOD: ABNORMAL
EOSINOPHIL # BLD AUTO: ABNORMAL K/UL (ref 0–0.5)
EOSINOPHIL NFR BLD: 2 % (ref 0–8)
ERYTHROCYTE [DISTWIDTH] IN BLOOD BY AUTOMATED COUNT: 11.9 % (ref 11.5–14.5)
EST. GFR  (AFRICAN AMERICAN): >60 ML/MIN/1.73 M^2
EST. GFR  (NON AFRICAN AMERICAN): >60 ML/MIN/1.73 M^2
GLUCOSE SERPL-MCNC: 126 MG/DL (ref 70–110)
HCT VFR BLD AUTO: 36.5 % (ref 37–48.5)
HGB BLD-MCNC: 12.6 G/DL (ref 12–16)
IMM GRANULOCYTES # BLD AUTO: ABNORMAL K/UL (ref 0–0.04)
IMM GRANULOCYTES NFR BLD AUTO: ABNORMAL % (ref 0–0.5)
INR PPP: 1 (ref 0.8–1.2)
LYMPHOCYTES # BLD AUTO: ABNORMAL K/UL (ref 1–4.8)
LYMPHOCYTES NFR BLD: 27 % (ref 18–48)
MCH RBC QN AUTO: 32.5 PG (ref 27–31)
MCHC RBC AUTO-ENTMCNC: 34.5 G/DL (ref 32–36)
MCV RBC AUTO: 94 FL (ref 82–98)
MONOCYTES # BLD AUTO: ABNORMAL K/UL (ref 0.3–1)
MONOCYTES NFR BLD: 5 % (ref 4–15)
NEUTROPHILS NFR BLD: 43 % (ref 38–73)
NEUTS BAND NFR BLD MANUAL: 23 %
NRBC BLD-RTO: 0 /100 WBC
PLATELET # BLD AUTO: 147 K/UL (ref 150–450)
PLATELET BLD QL SMEAR: ABNORMAL
PMV BLD AUTO: 9.5 FL (ref 9.2–12.9)
POTASSIUM SERPL-SCNC: 4.1 MMOL/L (ref 3.5–5.1)
PROT SERPL-MCNC: 6.8 G/DL (ref 6–8.4)
PROTHROMBIN TIME: 10.7 SEC (ref 9–12.5)
RBC # BLD AUTO: 3.88 M/UL (ref 4–5.4)
SODIUM SERPL-SCNC: 137 MMOL/L (ref 136–145)
WBC # BLD AUTO: 2.62 K/UL (ref 3.9–12.7)

## 2022-05-29 PROCEDURE — 94761 N-INVAS EAR/PLS OXIMETRY MLT: CPT

## 2022-05-29 PROCEDURE — 25000242 PHARM REV CODE 250 ALT 637 W/ HCPCS: Performed by: NURSE PRACTITIONER

## 2022-05-29 PROCEDURE — 25000003 PHARM REV CODE 250: Performed by: NURSE PRACTITIONER

## 2022-05-29 PROCEDURE — 99232 PR SUBSEQUENT HOSPITAL CARE,LEVL II: ICD-10-PCS | Mod: ,,, | Performed by: INTERNAL MEDICINE

## 2022-05-29 PROCEDURE — 99232 SBSQ HOSP IP/OBS MODERATE 35: CPT | Mod: ,,, | Performed by: INTERNAL MEDICINE

## 2022-05-29 PROCEDURE — 99222 1ST HOSP IP/OBS MODERATE 55: CPT | Mod: ,,, | Performed by: INTERNAL MEDICINE

## 2022-05-29 PROCEDURE — 27000207 HC ISOLATION

## 2022-05-29 PROCEDURE — 99223 PR INITIAL HOSPITAL CARE,LEVL III: ICD-10-PCS | Mod: ,,, | Performed by: INTERNAL MEDICINE

## 2022-05-29 PROCEDURE — 11000001 HC ACUTE MED/SURG PRIVATE ROOM

## 2022-05-29 PROCEDURE — 85027 COMPLETE CBC AUTOMATED: CPT | Performed by: NURSE PRACTITIONER

## 2022-05-29 PROCEDURE — 80053 COMPREHEN METABOLIC PANEL: CPT | Performed by: NURSE PRACTITIONER

## 2022-05-29 PROCEDURE — 85610 PROTHROMBIN TIME: CPT | Performed by: NURSE PRACTITIONER

## 2022-05-29 PROCEDURE — 99222 PR INITIAL HOSPITAL CARE,LEVL II: ICD-10-PCS | Mod: ,,, | Performed by: INTERNAL MEDICINE

## 2022-05-29 PROCEDURE — 85007 BL SMEAR W/DIFF WBC COUNT: CPT | Performed by: NURSE PRACTITIONER

## 2022-05-29 PROCEDURE — 99223 1ST HOSP IP/OBS HIGH 75: CPT | Mod: ,,, | Performed by: INTERNAL MEDICINE

## 2022-05-29 RX ORDER — BUTALBITAL, ACETAMINOPHEN AND CAFFEINE 50; 325; 40 MG/1; MG/1; MG/1
1 TABLET ORAL EVERY 6 HOURS PRN
Status: DISCONTINUED | OUTPATIENT
Start: 2022-05-29 | End: 2022-05-31

## 2022-05-29 RX ORDER — ACETYLCYSTEINE 200 MG/ML
70 SOLUTION ORAL; RESPIRATORY (INHALATION) 3 TIMES DAILY
Status: DISCONTINUED | OUTPATIENT
Start: 2022-05-29 | End: 2022-06-01

## 2022-05-29 RX ADMIN — ACETYLCYSTEINE 6680 MG: 200 SOLUTION ORAL; RESPIRATORY (INHALATION) at 02:05

## 2022-05-29 RX ADMIN — FAMOTIDINE 20 MG: 20 TABLET, FILM COATED ORAL at 10:05

## 2022-05-29 RX ADMIN — ACETYLCYSTEINE 6680 MG: 200 SOLUTION ORAL; RESPIRATORY (INHALATION) at 09:05

## 2022-05-29 RX ADMIN — FAMOTIDINE 20 MG: 20 TABLET, FILM COATED ORAL at 09:05

## 2022-05-29 RX ADMIN — OXYCODONE 10 MG: 5 TABLET ORAL at 03:05

## 2022-05-29 RX ADMIN — BUTALBITAL, ACETAMINOPHEN, AND CAFFEINE 1 TABLET: 50; 325; 40 TABLET ORAL at 07:05

## 2022-05-29 RX ADMIN — ACETYLCYSTEINE 6680 MG: 200 SOLUTION ORAL; RESPIRATORY (INHALATION) at 10:05

## 2022-05-29 NOTE — SUBJECTIVE & OBJECTIVE
Past Medical History:   Diagnosis Date    Anxiety     Depression     Kidney stones, age 16, 38 3/21/2018    Mixed hyperlipidemia 7/18/2019    Primary osteoarthritis of both knees 3/23/2021    Urinary tract infection 8/30/2013    Staphylococcus saprophyticus       Past Surgical History:   Procedure Laterality Date    COLONOSCOPY N/A 11/2/2018    Procedure: COLONOSCOPY;  Surgeon: Young Pink MD;  Location: [address]);  Service: Endoscopy;  Laterality: N/A;       Review of patient's allergies indicates:  No Known Allergies    Medications:  Medications Prior to Admission   Medication Sig    aspirin-acetaminophen-caffeine 250-250-65 mg (EXCEDRIN MIGRAINE) 250-250-65 mg per tablet Take 1 tablet by mouth every 6 (six) hours as needed for Pain.    citalopram (CELEXA) 40 MG tablet Take 1 tablet (40 mg total) by mouth once daily.    mv,Ca,min-iron-FA-guarana-caff (ONE-A-DAY WOMEN'S ACTIVE) 18 mg iron- 400 mcg-180 mg Tab Take 1 tablet by mouth once daily.     Antibiotics (From admission, onward)                None          Antifungals (From admission, onward)                None          Antivirals (From admission, onward)      None             Immunization History   Administered Date(s) Administered    COVID-19, vector-nr, rS-Ad26, PF ([employer]) 04/09/2021, 01/29/2022    Influenza 10/10/2018       Family History       Problem Relation (Age of Onset)    Breast cancer Maternal Uncle (60), Paternal Aunt (70)    Hypertension Mother, Father          Social History     Socioeconomic History    Marital status:    Tobacco Use    Smoking status: Never Smoker    Smokeless tobacco: Never Used   Substance and Sexual Activity    Alcohol use: Yes     Alcohol/week: 2.0 standard drinks     Types: 2 Glasses of wine per week     Comment: socially    Drug use: No    Sexual activity: Yes     Partners: Male     Birth control/protection: None     Comment: Essure procedure 13 yrs ago   Social History Narrative    2018    Newly  wed in 2016    Gardens and cook together    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, safe from take overs, happy with job    No children     Social Determinants of Health     Financial Resource Strain: Low Risk     Difficulty of Paying Living Expenses: Not hard at all   Food Insecurity: No Food Insecurity    Worried About Running Out of Food in the Last Year: Never true    Ran Out of Food in the Last Year: Never true   Transportation Needs: No Transportation Needs    Lack of Transportation (Medical): No    Lack of Transportation (Non-Medical): No   Physical Activity: Sufficiently Active    Days of Exercise per Week: 4 days    Minutes of Exercise per Session: 40 min   Stress: No Stress Concern Present    Feeling of Stress : Not at all   Social Connections: Unknown    Frequency of Communication with Friends and Family: Never    Frequency of Social Gatherings with Friends and Family: Never    Active Member of Clubs or Organizations: Yes    Attends Club or Organization Meetings: 1 to 4 times per year    Marital Status:    Housing Stability: Low Risk     Unable to Pay for Housing in the Last Year: No    Number of Places Lived in the Last Year: 1    Unstable Housing in the Last Year: No     Review of Systems   Constitutional:  Positive for fever. Negative for activity change, appetite change, chills, diaphoresis, fatigue and unexpected weight change.   HENT: Negative.     Eyes: Negative.    Respiratory: Negative.     Cardiovascular: Negative.    Gastrointestinal:  Positive for constipation (chronic) and nausea. Negative for abdominal distention, abdominal pain, anal bleeding, blood in stool, diarrhea and vomiting.   Endocrine: Negative.    Genitourinary: Negative.    Musculoskeletal: Negative.    Skin: Negative.    Allergic/Immunologic: Negative.    Neurological: Negative.    Hematological: Negative.    Psychiatric/Behavioral:  Negative for self-injury, sleep disturbance and  suicidal ideas. The patient is nervous/anxious.    Objective:     Vital Signs (Most Recent):  Temp: 97.9 °F (36.6 °C) (05/29/22 1140)  Pulse: 70 (05/29/22 1140)  Resp: 20 (05/29/22 1140)  BP: 130/82 (05/29/22 1140)  SpO2: 98 % (05/29/22 1200) Vital Signs (24h Range):  Temp:  [97.5 °F (36.4 °C)-98.2 °F (36.8 °C)] 97.9 °F (36.6 °C)  Pulse:  [] 70  Resp:  [18-20] 20  SpO2:  [94 %-98 %] 98 %  BP: (123-150)/(82-86) 130/82     Weight: 97.2 kg (214 lb 4.6 oz)  Body mass index is 29.89 kg/m².    Estimated Creatinine Clearance: 103.3 mL/min (based on SCr of 0.8 mg/dL).    Physical Exam  Constitutional:       General: She is not in acute distress.     Appearance: She is not ill-appearing, toxic-appearing or diaphoretic.      Comments: Mildly guarded during questioning, vague responses requiring probing, healthy appearing, good hygiene   HENT:      Head: Normocephalic and atraumatic.      Nose: Nose normal.      Mouth/Throat:      Mouth: Mucous membranes are moist.   Eyes:      General: Scleral icterus (very mild) present.      Extraocular Movements: Extraocular movements intact.      Pupils: Pupils are equal, round, and reactive to light.   Cardiovascular:      Rate and Rhythm: Normal rate and regular rhythm.      Pulses: Normal pulses.      Heart sounds: Normal heart sounds.   Pulmonary:      Effort: Pulmonary effort is normal.      Breath sounds: Normal breath sounds.   Abdominal:      Palpations: Abdomen is soft.      Tenderness: There is no abdominal tenderness. There is no guarding or rebound.      Hernia: No hernia is present.   Genitourinary:     Comments: deferred  Musculoskeletal:         General: Normal range of motion.      Cervical back: Normal range of motion and neck supple.   Skin:     General: Skin is warm and dry.      Capillary Refill: Capillary refill takes less than 2 seconds.   Neurological:      General: No focal deficit present.      Mental Status: She is alert and oriented to person, place, and  time.   Psychiatric:      Comments: See constitutional       Significant Labs: All pertinent labs within the past 24 hours have been reviewed.    Significant Imaging: I have reviewed all pertinent imaging results/findings within the past 24 hours.

## 2022-05-29 NOTE — HPI
"52 year old female with a PMH of depression on Celexa who presented to the ED with CC of a one week history of acute onset nausea with associated daily fevers, frontal headache earlier in the week that has since resolved, & up to 102F not relieved with home alternating Tylenol and Ibuprofen every 3 hours. She explains awakening Monday with symptoms as above. She visited Urgent care who told her she likely had a "stomach virus" and to alternate Tylenol and Ibuprofen for fevers. When symptoms were no better and fever was not breaking with alternating meds as below, she presented to the ED for further evaluation "something is not right". She denies abdominal pain, chest pain, cough, dyspnea, trauma, diarrhea, prolonged headaches, myalgias, neck pain, changes to bladder habits, previous episodes. She denies out of the country travel or sick contacts. 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 Denies new medications. She was found in the ED to have pancytopenia with WBC of 1.6, platelets of 139,000 on CBC, ALk phos 199 (baseline 70's)  (baseline 26)  (baseline 33), normal procalcitonin, normal lactic acid, UA with proteinuria/occult blood/&RBCs. Abdominal US with mild splenomegaly (14 cms) with normal appearing liver and biliary system. ANC calculation ~ 1000.     She has 2 dogs and 2 cats at home. Briefly fostered a cat 1 week prior to symptoms. Feels improved today.   "

## 2022-05-29 NOTE — CONSULTS
"[facility]  Infectious Disease  Consult Note    Patient Name: Harpreet Peralta  MRN: [id number]  Admission Date: 5/28/2022  Hospital Length of Stay: 1 days  Attending Physician: Kody Pascal, *  Primary Care Provider: Katelin Guerra MD     Isolation Status: Neutropenic    Patient information was obtained from patient and past medical records.      Consults  Assessment/Plan:     Abnormal liver function  Await pending tests    Neutropenia  On admission had an ANC of roughly 1000    -this would qualify as mild neutropenia  -today her ANC has recover to above 1500  -unclear cause but given hx mostly likely cause would be viral illness  -agree HIV and Hepatitis testing although suspicion is low  -given improvement in symptoms likely self limited viral infection        Thank you for your consult. I will follow-up with patient. Please contact us if you have any additional questions.    Cuauhtemoc Odonnell MD  Infectious Disease  [facility] S[facility]    Subjective:     Principal Problem: Pancytopenia    HPI: 52 year old female with a PMH of depression on Celexa who presented to the ED with CC of a one week history of acute onset nausea with associated daily fevers, frontal headache earlier in the week that has since resolved, & up to 102F not relieved with home alternating Tylenol and Ibuprofen every 3 hours. She explains awakening Monday with symptoms as above. She visited Urgent care who told her she likely had a "stomach virus" and to alternate Tylenol and Ibuprofen for fevers. When symptoms were no better and fever was not breaking with alternating meds as below, she presented to the ED for further evaluation "something is not right". She denies abdominal pain, chest pain, cough, dyspnea, trauma, diarrhea, prolonged headaches, myalgias, neck pain, changes to bladder habits, previous episodes. She denies out of the country travel or sick contacts. 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 Denies new " medications. She was found in the ED to have pancytopenia with WBC of 1.6, platelets of 139,000 on CBC, ALk phos 199 (baseline 70's)  (baseline 26)  (baseline 33), normal procalcitonin, normal lactic acid, UA with proteinuria/occult blood/&RBCs. Abdominal US with mild splenomegaly (14 cms) with normal appearing liver and biliary system. ANC calculation ~ 1000.     She has 2 dogs and 2 cats at home. Briefly fostered a cat 1 week prior to symptoms. Feels improved today.       Past Medical History:   Diagnosis Date    Anxiety     Depression     Kidney stones, age 16, 38 3/21/2018    Mixed hyperlipidemia 7/18/2019    Primary osteoarthritis of both knees 3/23/2021    Urinary tract infection 8/30/2013    Staphylococcus saprophyticus       Past Surgical History:   Procedure Laterality Date    COLONOSCOPY N/A 11/2/2018    Procedure: COLONOSCOPY;  Surgeon: Young Pink MD;  Location: [address]);  Service: Endoscopy;  Laterality: N/A;       Review of patient's allergies indicates:  No Known Allergies    Medications:  Medications Prior to Admission   Medication Sig    aspirin-acetaminophen-caffeine 250-250-65 mg (EXCEDRIN MIGRAINE) 250-250-65 mg per tablet Take 1 tablet by mouth every 6 (six) hours as needed for Pain.    citalopram (CELEXA) 40 MG tablet Take 1 tablet (40 mg total) by mouth once daily.    mv,Ca,min-iron-FA-guarana-caff (ONE-A-DAY WOMEN'S ACTIVE) 18 mg iron- 400 mcg-180 mg Tab Take 1 tablet by mouth once daily.     Antibiotics (From admission, onward)                None          Antifungals (From admission, onward)                None          Antivirals (From admission, onward)      None             Immunization History   Administered Date(s) Administered    COVID-19, vector-nr, rS-Ad26, PF ([employer]) 04/09/2021, 01/29/2022    Influenza 10/10/2018       Family History       Problem Relation (Age of Onset)    Breast cancer Maternal Uncle (60), Paternal Aunt (70)     Hypertension Mother, Father          Social History     Socioeconomic History    Marital status:    Tobacco Use    Smoking status: Never Smoker    Smokeless tobacco: Never Used   Substance and Sexual Activity    Alcohol use: Yes     Alcohol/week: 2.0 standard drinks     Types: 2 Glasses of wine per week     Comment: socially    Drug use: No    Sexual activity: Yes     Partners: Male     Birth control/protection: None     Comment: Essure procedure 13 yrs ago   Social History Narrative    2018    Newly wed in 2016    Gardens and cook together    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, safe from take overs, happy with job    No children     Social Determinants of Health     Financial Resource Strain: Low Risk     Difficulty of Paying Living Expenses: Not hard at all   Food Insecurity: No Food Insecurity    Worried About Running Out of Food in the Last Year: Never true    Ran Out of Food in the Last Year: Never true   Transportation Needs: No Transportation Needs    Lack of Transportation (Medical): No    Lack of Transportation (Non-Medical): No   Physical Activity: Sufficiently Active    Days of Exercise per Week: 4 days    Minutes of Exercise per Session: 40 min   Stress: No Stress Concern Present    Feeling of Stress : Not at all   Social Connections: Unknown    Frequency of Communication with Friends and Family: Never    Frequency of Social Gatherings with Friends and Family: Never    Active Member of Clubs or Organizations: Yes    Attends Club or Organization Meetings: 1 to 4 times per year    Marital Status:    Housing Stability: Low Risk     Unable to Pay for Housing in the Last Year: No    Number of Places Lived in the Last Year: 1    Unstable Housing in the Last Year: No     Review of Systems   Constitutional:  Positive for fever. Negative for activity change, appetite change, chills, diaphoresis, fatigue and unexpected weight change.   HENT:  Negative.     Eyes: Negative.    Respiratory: Negative.     Cardiovascular: Negative.    Gastrointestinal:  Positive for constipation (chronic) and nausea. Negative for abdominal distention, abdominal pain, anal bleeding, blood in stool, diarrhea and vomiting.   Endocrine: Negative.    Genitourinary: Negative.    Musculoskeletal: Negative.    Skin: Negative.    Allergic/Immunologic: Negative.    Neurological: Negative.    Hematological: Negative.    Psychiatric/Behavioral:  Negative for self-injury, sleep disturbance and suicidal ideas. The patient is nervous/anxious.    Objective:     Vital Signs (Most Recent):  Temp: 97.9 °F (36.6 °C) (05/29/22 1140)  Pulse: 70 (05/29/22 1140)  Resp: 20 (05/29/22 1140)  BP: 130/82 (05/29/22 1140)  SpO2: 98 % (05/29/22 1200) Vital Signs (24h Range):  Temp:  [97.5 °F (36.4 °C)-98.2 °F (36.8 °C)] 97.9 °F (36.6 °C)  Pulse:  [] 70  Resp:  [18-20] 20  SpO2:  [94 %-98 %] 98 %  BP: (123-150)/(82-86) 130/82     Weight: 97.2 kg (214 lb 4.6 oz)  Body mass index is 29.89 kg/m².    Estimated Creatinine Clearance: 103.3 mL/min (based on SCr of 0.8 mg/dL).    Physical Exam  Constitutional:       General: She is not in acute distress.     Appearance: She is not ill-appearing, toxic-appearing or diaphoretic.      Comments: Mildly guarded during questioning, vague responses requiring probing, healthy appearing, good hygiene   HENT:      Head: Normocephalic and atraumatic.      Nose: Nose normal.      Mouth/Throat:      Mouth: Mucous membranes are moist.   Eyes:      General: Scleral icterus (very mild) present.      Extraocular Movements: Extraocular movements intact.      Pupils: Pupils are equal, round, and reactive to light.   Cardiovascular:      Rate and Rhythm: Normal rate and regular rhythm.      Pulses: Normal pulses.      Heart sounds: Normal heart sounds.   Pulmonary:      Effort: Pulmonary effort is normal.      Breath sounds: Normal breath sounds.   Abdominal:      Palpations:  Abdomen is soft.      Tenderness: There is no abdominal tenderness. There is no guarding or rebound.      Hernia: No hernia is present.   Genitourinary:     Comments: deferred  Musculoskeletal:         General: Normal range of motion.      Cervical back: Normal range of motion and neck supple.   Skin:     General: Skin is warm and dry.      Capillary Refill: Capillary refill takes less than 2 seconds.   Neurological:      General: No focal deficit present.      Mental Status: She is alert and oriented to person, place, and time.   Psychiatric:      Comments: See constitutional       Significant Labs: All pertinent labs within the past 24 hours have been reviewed.    Significant Imaging: I have reviewed all pertinent imaging results/findings within the past 24 hours.

## 2022-05-29 NOTE — ASSESSMENT & PLAN NOTE
On admission had an ANC of roughly 1000    -this would qualify as mild neutropenia  -today her ANC has recover to above 1500  -unclear cause but given hx mostly likely cause would be viral illness  -agree HIV and Hepatitis testing although suspicion is low  -given improvement in symptoms likely self limited viral infection

## 2022-05-29 NOTE — ASSESSMENT & PLAN NOTE
WBC 1.6  No bands  Neutrophil 58%  No left shift  No obvious illness  Symptoms of nausea and fever are vague at best  No new medications  No immunosuppressant home meds  ANC calculates to ~ 1000 (moderate neutropenia)    -check HIV  -neutropenic precautions  -Consult ID to surveil need for empiric BS antibiotics for now +/- antifungal   -Daily CBC with diff with ANC calculation      Resolved   Await HIV   Await hematology evaluation  Hopeful for identification of source soon

## 2022-05-29 NOTE — CONSULTS
[facility] Surg  Gastroenterology  Consult Note    Patient Name: Harpreet Peralta  MRN: [id number]  Admission Date: 5/28/2022  Hospital Length of Stay: 1 days  Code Status: Full Code   Attending Provider: Kody Pascal, *   Consulting Provider: Shayna Vela MD  Primary Care Physician: Katelin Guerra MD  Principal Problem:Pancytopenia    Inpatient consult to Gastroenterology  Consult performed by: Shayna Vela MD  Consult ordered by: Candelaria Stover NP  Reason for consult: Elevated LFTs  Assessment/Recommendations: Please see A/P below        Subjective:     HPI:  53 yo F admitted with FUO found to have elevated LFTs and decreased WBC/platelets.  She states that 6 days ago she began having chills and fever without many other symptoms.  She developed headaches and nausea, but no focal symptoms to identify source for fever.  She went to ED and was told it was likely a virus and told to take Tylenol and NSAIDS, which she did around the clock but fever would not subside.  Since admission she states that she is starting to feel a little better.      Past Medical History:   Diagnosis Date    Anxiety     Depression     Kidney stones, age 16, 38 3/21/2018    Mixed hyperlipidemia 7/18/2019    Primary osteoarthritis of both knees 3/23/2021    Urinary tract infection 8/30/2013    Staphylococcus saprophyticus       Past Surgical History:   Procedure Laterality Date    COLONOSCOPY N/A 11/2/2018    Procedure: COLONOSCOPY;  Surgeon: Young Pink MD;  Location: [address];  Service: Endoscopy;  Laterality: N/A;       Review of patient's allergies indicates:  No Known Allergies  Family History       Problem Relation (Age of Onset)    Breast cancer Maternal Uncle (60), Paternal Aunt (70)    Hypertension Mother, Father          Tobacco Use    Smoking status: Never Smoker    Smokeless tobacco: Never Used   Substance and Sexual Activity    Alcohol use: Yes     Alcohol/week: 2.0 standard drinks      Types: 2 Glasses of wine per week     Comment: socially    Drug use: No    Sexual activity: Yes     Partners: Male     Birth control/protection: None     Comment: Essure procedure 13 yrs ago     Review of Systems   Constitutional:  Positive for appetite change, chills, fatigue and fever.   Eyes:  Negative for photophobia and visual disturbance.   Respiratory:  Negative for chest tightness, shortness of breath and wheezing.    Cardiovascular:  Negative for chest pain, palpitations and leg swelling.   Gastrointestinal:  Positive for nausea. Negative for abdominal pain and blood in stool.   Genitourinary:  Negative for dysuria, flank pain and hematuria.   Musculoskeletal:  Negative for joint swelling and myalgias.   Skin:  Negative for color change and rash.   Neurological:  Negative for dizziness and speech difficulty.   Psychiatric/Behavioral:  Negative for confusion and hallucinations.    Objective:     Vital Signs (Most Recent):  Temp: 97.9 °F (36.6 °C) (05/29/22 1140)  Pulse: 70 (05/29/22 1140)  Resp: 20 (05/29/22 1140)  BP: 130/82 (05/29/22 1140)  SpO2: 98 % (05/29/22 1140) Vital Signs (24h Range):  Temp:  [97.5 °F (36.4 °C)-98.2 °F (36.8 °C)] 97.9 °F (36.6 °C)  Pulse:  [] 70  Resp:  [18-20] 20  SpO2:  [94 %-98 %] 98 %  BP: (123-150)/(82-86) 130/82     Weight: 97.2 kg (214 lb 4.6 oz) (05/29/22 0930)  Body mass index is 29.89 kg/m².      Intake/Output Summary (Last 24 hours) at 5/29/2022 1204  Last data filed at 5/29/2022 0900  Gross per 24 hour   Intake 120 ml   Output --   Net 120 ml       Lines/Drains/Airways       Peripheral Intravenous Line  Duration                  Peripheral IV - Single Lumen 05/28/22 0648 20 G Left Antecubital 1 day                    Physical Exam  Constitutional:       Appearance: Normal appearance. She is well-developed. She is not toxic-appearing.   HENT:      Head: Normocephalic and atraumatic.   Eyes:      Extraocular Movements: Extraocular movements intact.      Pupils:  Pupils are equal, round, and reactive to light.   Pulmonary:      Effort: Pulmonary effort is normal. No respiratory distress.   Abdominal:      General: There is no distension.      Palpations: Abdomen is soft.   Musculoskeletal:         General: No signs of injury. Normal range of motion.      Cervical back: Normal range of motion and neck supple.   Neurological:      General: No focal deficit present.      Mental Status: She is alert and oriented to person, place, and time.   Psychiatric:         Mood and Affect: Mood normal.         Behavior: Behavior normal.         Thought Content: Thought content normal.         Judgment: Judgment normal.       Significant Labs:  CBC:   Recent Labs   Lab 05/28/22  0743 05/29/22 0612   WBC 1.67* 2.62*   HGB 12.2 12.6   HCT 35.2* 36.5*   * 147*     CMP:   Recent Labs   Lab 05/29/22 0612   *   CALCIUM 9.4   ALBUMIN 3.5   PROT 6.8      K 4.1   CO2 24      BUN 8   CREATININE 0.8   ALKPHOS 228*   *   *   BILITOT 2.0*     Coagulation:   Recent Labs   Lab 05/29/22 0612   INR 1.0     Lab Results   Component Value Date    HEPCAB Negative 07/30/2020     Acute hep panel pending    Significant Imaging:  Imaging results within the past 24 hours have been reviewed.  U/s with splenomegaly only, normal appearing liver and biliary system.    Assessment/Plan:     Abnormal liver function  - This is likely reactive to whatever else is going on.  Hopefully this is a virus but myelodysplastic conditions can also cause this.  Agree with ID and Heme evaluation.  She has no risk factors for Hep B/C and has not presented like Hep A, so this is not likely hepatic in origin.  - Avoid hepatotoxic agents  - I had asked the team to NAC her yesterday given her Tylenol use, but she did not get a dose until this morning.  That means we are likely outside the window for NAC to help, but given that her LFT went up slightly, it is best to finish the therapy unless she  can not tolerate it.        Thank you for your consult. Our team will follow-up with patient. Please contact us if you have any additional questions.    Shayna Vela MD  Gastroenterology  [facility] Surg

## 2022-05-29 NOTE — HOSPITAL COURSE
Presented with acute onset persistent fevers up to 102F and nauseas without abdominal pain  Found to have abnormal liver function and pancytopenia with moderate neutropenia  She denies any new medications, known sick contacts, or recent illness  Abdominal US showed normal liver and ducts and mild splenomegaly  HIV, Hepatitis Panel, INR requested  GI and Hematology requested  GI briefly reviewed records and considering ATc Tylenol usage recommended Mucomyst initiation until full consult to follow  Unfortunately, Mucomyst perceived as started, not officially started until this am    5/29/2022  Hepatitis A/B/C negative   HIV pending  GI consulted who no longer thinks this is tylenol related but recommends continued mucomyst for now; hopeful for viral etiology but concern for myelodysplastic conditions exist  She is no longer neutropenic today; ANC 2000  Plan for discontinuation of neutropenic precaution if she remains with stable ANC tomorrow  ID evaluated the patient and believes her presenting symptoms are consistent with viral process; considering her resolution of neutropenia had no more to add to case  Hematology consult requested and pending evaluation    5/30/2022  Still with some nausea  HIV negative  Hep A/B/C non reactive  She is no longer neutropenic  Leukopenia persists but improving  ID, GI, hematology believes abnormal liver function could be viral in nature  Hepatic function trending downward but still pretty elevated  Blood cultures NGTD  GI recommends continued hepatic trend for now  OK to stop Mucomyst tomorrow, continue today--> per GI  She has a sever migraine with photophobia currently, prefers to take her Excedrin today but willing to try migraine cocktail (Toradol/compazine/Benadryl IV) if Excedrin not successful with aborting migraine     5/31/22  Bili trending down, however noted to have worsening ALT likely 2/2 continued ingestion of tylenol (excedrin migraine and fioricet)  Discontinue all  use of tylenol  Continue mucomyst for now    6/1/22  Bili and LFTs trending down since removing all tylenol containing meds  Patient remains stable. Sumatriptan given for migraine  Cleared for discharge home with GI/hepatology follow up. Avoid all tylenol containing medications

## 2022-05-29 NOTE — ASSESSMENT & PLAN NOTE
- This is likely reactive to whatever else is going on.  Hopefully this is a virus but myelodysplastic conditions can also cause this.  Agree with ID and Heme evaluation.  She has no risk factors for Hep B/C and has not presented like Hep A, so this is not likely hepatic in origin.  - Avoid hepatotoxic agents  - I had asked the team to NAC her yesterday given her Tylenol use, but she did not get a dose until this morning.  That means we are likely outside the window for NAC to help, but given that her LFT went up slightly, it is best to finish the therapy unless she can not tolerate it.

## 2022-05-29 NOTE — SUBJECTIVE & OBJECTIVE
Interval History:   No fevers  Nausea and fevers resolved  Leukopenia/Neutropenia resolved  Hep A/B/C negative  HIV pending  Liver function still on upward trend  GI and ID note reviewed  Awaiting hematology evaluation    Review of Systems   Constitutional: Negative.    HENT: Negative.     Eyes: Negative.  Negative for visual disturbance.   Respiratory: Negative.     Cardiovascular: Negative.    Gastrointestinal: Negative.    Endocrine: Negative.    Genitourinary: Negative.    Musculoskeletal:  Negative for arthralgias, back pain, gait problem, joint swelling, myalgias, neck pain and neck stiffness.   Skin: Negative.    Allergic/Immunologic: Negative.    Neurological:  Positive for headaches (intermittent). Negative for dizziness, tremors, seizures, syncope, facial asymmetry, speech difficulty, weakness, light-headedness and numbness.   Hematological: Negative.    Psychiatric/Behavioral: Negative.     Objective:     Vital Signs (Most Recent):  Temp: 97.9 °F (36.6 °C) (05/29/22 1140)  Pulse: 78 (05/29/22 1200)  Resp: 20 (05/29/22 1140)  BP: 130/82 (05/29/22 1140)  SpO2: 98 % (05/29/22 1200) Vital Signs (24h Range):  Temp:  [97.5 °F (36.4 °C)-98.2 °F (36.8 °C)] 97.9 °F (36.6 °C)  Pulse:  [] 78  Resp:  [18-20] 20  SpO2:  [94 %-98 %] 98 %  BP: (123-150)/(82-86) 130/82     Weight: 97.2 kg (214 lb 4.6 oz)  Body mass index is 29.89 kg/m².    Intake/Output Summary (Last 24 hours) at 5/29/2022 1328  Last data filed at 5/29/2022 0900  Gross per 24 hour   Intake 120 ml   Output --   Net 120 ml      Physical Exam  Constitutional:       Appearance: Normal appearance.   HENT:      Head: Normocephalic and atraumatic.      Nose: Nose normal.      Mouth/Throat:      Mouth: Mucous membranes are moist.   Eyes:      General: No scleral icterus.     Extraocular Movements: Extraocular movements intact.      Pupils: Pupils are equal, round, and reactive to light.   Cardiovascular:      Rate and Rhythm: Normal rate and regular  rhythm.      Pulses: Normal pulses.      Heart sounds: Normal heart sounds.   Pulmonary:      Effort: Pulmonary effort is normal.      Breath sounds: Normal breath sounds.   Abdominal:      General: Bowel sounds are normal.      Palpations: Abdomen is soft.      Tenderness: There is no abdominal tenderness. There is no guarding or rebound.      Hernia: No hernia is present.   Genitourinary:     Comments: deferred  Musculoskeletal:         General: Normal range of motion.      Cervical back: Normal range of motion and neck supple.   Skin:     General: Skin is warm and dry.      Capillary Refill: Capillary refill takes less than 2 seconds.   Neurological:      Mental Status: She is alert and oriented to person, place, and time.   Psychiatric:         Mood and Affect: Mood normal.       Significant Labs: All pertinent labs within the past 24 hours have been reviewed.  CBC:   Recent Labs   Lab 05/28/22  0743 05/29/22  0612   WBC 1.67* 2.62*   HGB 12.2 12.6   HCT 35.2* 36.5*   * 147*     CMP:   Recent Labs   Lab 05/28/22 0743 05/29/22  0612   * 137   K 3.9 4.1    102   CO2 23 24    126*   BUN 6 8   CREATININE 0.8 0.8   CALCIUM 9.4 9.4   PROT 7.1 6.8   ALBUMIN 3.5 3.5   BILITOT 2.2* 2.0*   ALKPHOS 199* 228*   * 448*   * 540*   ANIONGAP 11 11   EGFRNONAA >60 >60     Lab Results   Component Value Date    HEPCAB Negative 07/30/2020         Microbiology Results (last 7 days)       Procedure Component Value Units Date/Time    Blood culture x two cultures. Draw prior to antibiotics. [[id number]] Collected: 05/28/22 0742    Order Status: Completed Specimen: Blood from Peripheral, Antecubital, Left Updated: 05/29/22 1012     Blood Culture, Routine No Growth to date      No Growth to date    Narrative:      Aerobic and anaerobic    Blood culture x two cultures. Draw prior to antibiotics. [[id number]] Collected: 05/28/22 0802    Order Status: Completed Specimen: Blood from Peripheral, Forearm,  Right Updated: 05/29/22 0115     Blood Culture, Routine No Growth to date    Narrative:      Aerobic and anaerobic    Influenza A & B by Molecular [[id number]] Collected: 05/28/22 0739    Order Status: Completed Specimen: Nasopharyngeal Swab Updated: 05/28/22 0853     Influenza A, Molecular Negative     Influenza B, Molecular Negative     Flu A & B Source Nasal swab            Significant Imaging: I have reviewed all pertinent imaging results/findings within the past 24 hours.

## 2022-05-29 NOTE — PROGRESS NOTES
"[facility]  Progress Note    Patient Name: Harpreet Peralta  MRN: [id number]  Patient Class: IP- Inpatient   Admission Date: 5/28/2022  Length of Stay: 1 days  Attending Physician: Kody Pascal, *  Primary Care Provider: Katelin Guerra MD        Subjective:     Principal Problem:Pancytopenia        HPI:  This is a pleasant 52 year old WF with a history of depression in remission on Celexa who presented to the ED with CC of a one week  history of acute onset nausea with associated daily fevers, frontal headache earlier in the week that has since resolved, & up to 102F not relieved with home alternating Tylenol and Ibuprofen every 3 hours. She explains awakening Monday with symptoms as above. She visited Urgent care who told her she likely had a "stomach virus" and to alternate Tylenol and Ibuprofen for fevers. She chatted with her PCP's colleague (PCP retired) who advised her to do the same. She visited with a virtual nurse via phone who also confirmed treatment as above. By this am, when symptoms were no better and fever was not breaking with alternating meds as below, she presented to the ED for further evaluation "something is not right". She denies abdominal pain, chest pain, cough, dyspnea, trauma, diarrhea, prolonged headaches, myalgias, neck pain, changes to bladder habits, previous episodes. She denies out of the country travel or sick contacts. 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 No abnormal bruising or bleeding. Denies new medications. She reports elevated liver enzymes years ago with her PCP who told her to stop her weekend drinking. She reports abnormal liver imaging at that time "my doctor said it was fat". Review of records does not show any of reported "years" prior liver function serum or imaging. Hepatic panel was normal one year ago. No pertinent FH. She was found in the ED to have pancytopenia with WBC " of 1.6, platelets of 139,000 on CBC, ALk phos 199 (baseline 70's)  (baseline 26)  (baseline 33), normal procalcitonin, normal lactic acid, UA with proteinuria/occult blood/&RBCs. Abdominal US with mild splenomegaly (14 cms) with normal appearing liver and biliary system. ANC calculation ~ 1000. Blood cultures were collected. ID consulted for neutropenia. Hepatitis panel requested. She was admitted to the medical surgical unit for evaluation of abnormal liver function with moderate neutropenia + pancytopenia. Awaiting ID and GI evaluation.       Overview/Hospital Course:  Presented with acute onset persistent fevers up to 102F and nauseas without abdominal pain  Found to have abnormal liver function and pancytopenia with moderate neutropenia  She denies any new medications, known sick contacts, or recent illness  Abdominal US showed normal liver and ducts and mild splenomegaly  HIV, Hepatitis Panel, INR requested  GI and Hematology requested  GI briefly reviewed records and considering ATc Tylenol usage recommended Mucomyst initiation until full consult to follow  Unfortunately, Mucomyst perceived as started, not officially started until this am    5/29/2022  Hepatitis A/B/C negative   HIV pending  GI consulted who no longer thinks this is tylenol related but recommends continued mucomyst for now; hopeful for viral etiology but concern for myelodysplastic conditions exist  She is no longer neutropenic today; ANC 2000  Plan for discontinuation of neutropenic precaution if she remains with stable ANC tomorrow  ID evaluated the patient and believes her presenting symptoms are consistent with viral process; considering her resolution of neutropenia had no more to add to case  Hematology consult requested and pending evaluation      Interval History:   No fevers  Nausea and fevers resolved  Leukopenia/Neutropenia resolved  Hep A/B/C negative  HIV pending  Liver function still on upward trend  GI and ID note  reviewed  Awaiting hematology evaluation    Review of Systems   Constitutional: Negative.    HENT: Negative.     Eyes: Negative.  Negative for visual disturbance.   Respiratory: Negative.     Cardiovascular: Negative.    Gastrointestinal: Negative.    Endocrine: Negative.    Genitourinary: Negative.    Musculoskeletal:  Negative for arthralgias, back pain, gait problem, joint swelling, myalgias, neck pain and neck stiffness.   Skin: Negative.    Allergic/Immunologic: Negative.    Neurological:  Positive for headaches (intermittent). Negative for dizziness, tremors, seizures, syncope, facial asymmetry, speech difficulty, weakness, light-headedness and numbness.   Hematological: Negative.    Psychiatric/Behavioral: Negative.     Objective:     Vital Signs (Most Recent):  Temp: 97.9 °F (36.6 °C) (05/29/22 1140)  Pulse: 78 (05/29/22 1200)  Resp: 20 (05/29/22 1140)  BP: 130/82 (05/29/22 1140)  SpO2: 98 % (05/29/22 1200) Vital Signs (24h Range):  Temp:  [97.5 °F (36.4 °C)-98.2 °F (36.8 °C)] 97.9 °F (36.6 °C)  Pulse:  [] 78  Resp:  [18-20] 20  SpO2:  [94 %-98 %] 98 %  BP: (123-150)/(82-86) 130/82     Weight: 97.2 kg (214 lb 4.6 oz)  Body mass index is 29.89 kg/m².    Intake/Output Summary (Last 24 hours) at 5/29/2022 1328  Last data filed at 5/29/2022 0900  Gross per 24 hour   Intake 120 ml   Output --   Net 120 ml      Physical Exam  Constitutional:       Appearance: Normal appearance.   HENT:      Head: Normocephalic and atraumatic.      Nose: Nose normal.      Mouth/Throat:      Mouth: Mucous membranes are moist.   Eyes:      General: No scleral icterus.     Extraocular Movements: Extraocular movements intact.      Pupils: Pupils are equal, round, and reactive to light.   Cardiovascular:      Rate and Rhythm: Normal rate and regular rhythm.      Pulses: Normal pulses.      Heart sounds: Normal heart sounds.   Pulmonary:      Effort: Pulmonary effort is normal.      Breath sounds: Normal breath sounds.   Abdominal:       General: Bowel sounds are normal.      Palpations: Abdomen is soft.      Tenderness: There is no abdominal tenderness. There is no guarding or rebound.      Hernia: No hernia is present.   Genitourinary:     Comments: deferred  Musculoskeletal:         General: Normal range of motion.      Cervical back: Normal range of motion and neck supple.   Skin:     General: Skin is warm and dry.      Capillary Refill: Capillary refill takes less than 2 seconds.   Neurological:      Mental Status: She is alert and oriented to person, place, and time.   Psychiatric:         Mood and Affect: Mood normal.       Significant Labs: All pertinent labs within the past 24 hours have been reviewed.  CBC:   Recent Labs   Lab 05/28/22 0743 05/29/22  0612   WBC 1.67* 2.62*   HGB 12.2 12.6   HCT 35.2* 36.5*   * 147*     CMP:   Recent Labs   Lab 05/28/22  0743 05/29/22  0612   * 137   K 3.9 4.1    102   CO2 23 24    126*   BUN 6 8   CREATININE 0.8 0.8   CALCIUM 9.4 9.4   PROT 7.1 6.8   ALBUMIN 3.5 3.5   BILITOT 2.2* 2.0*   ALKPHOS 199* 228*   * 448*   * 540*   ANIONGAP 11 11   EGFRNONAA >60 >60     Lab Results   Component Value Date    HEPCAB Negative 07/30/2020         Microbiology Results (last 7 days)       Procedure Component Value Units Date/Time    Blood culture x two cultures. Draw prior to antibiotics. [[id number]] Collected: 05/28/22 0742    Order Status: Completed Specimen: Blood from Peripheral, Antecubital, Left Updated: 05/29/22 1012     Blood Culture, Routine No Growth to date      No Growth to date    Narrative:      Aerobic and anaerobic    Blood culture x two cultures. Draw prior to antibiotics. [[id number]] Collected: 05/28/22 0802    Order Status: Completed Specimen: Blood from Peripheral, Forearm, Right Updated: 05/29/22 0115     Blood Culture, Routine No Growth to date    Narrative:      Aerobic and anaerobic    Influenza A & B by Molecular [[id number]] Collected: 05/28/22 0739  "   Order Status: Completed Specimen: Nasopharyngeal Swab Updated: 05/28/22 0853     Influenza A, Molecular Negative     Influenza B, Molecular Negative     Flu A & B Source Nasal swab            Significant Imaging: I have reviewed all pertinent imaging results/findings within the past 24 hours.      Assessment/Plan:      * Pancytopenia  WBC 1.6; ANC calculates to ~ 1000 (moderate neutropenia)  Platelets 139,000  Mildly reduced RBC      No obvious illness  Symptoms of nausea and fever are vague at best  No new medications  No immunosuppressant home meds    Improving  Await hematology evaluation    -check HIV  -await hepatitis panel  -PT/INR  -May require hematology support if source is not readily identifiable      Abnormal liver function  She reports vague history of alcohol indulging (reports no abuse) years ago  Reports history of abnormal liver function and "fatty liver" years ago but again is vague in description and not provoking in responses when probed  Review of record does not support verbalizations  No bleeding  No bruising  She has been on Tylenol of unknown amount every 6 hours x 7 days    -avoid hepatotoxic agents including tylenol  -alcohol level, UDS  -PT/INR  -hepatitis panel  -consult GI  -CMP daily    Hep A/B/C negative  Gi note reviewed  Await hematology evaluation   Hopefully viral       Neutropenia  WBC 1.6  No bands  Neutrophil 58%  No left shift  No obvious illness  Symptoms of nausea and fever are vague at best  No new medications  No immunosuppressant home meds  ANC calculates to ~ 1000 (moderate neutropenia)    -check HIV  -neutropenic precautions  -Consult ID to surveil need for empiric BS antibiotics for now +/- antifungal   -Daily CBC with diff with ANC calculation      Resolved   Await HIV   Await hematology evaluation  Hopeful for identification of source soon          VTE Risk Mitigation (From admission, onward)         Ordered     IP VTE LOW RISK PATIENT  Once         05/28/22 1152 "     Place MARIANNA hose  Until discontinued         05/28/22 1152                Discharge Planning   SHARAD:      Code Status: Full Code   Is the patient medically ready for discharge?:     Reason for patient still in hospital (select all that apply): Laboratory test, Treatment and Consult recommendations                     Candelaria Stover NP  Department of Hospital Medicine   [facility]

## 2022-05-29 NOTE — HPI
53 yo F admitted with FUO found to have elevated LFTs and decreased WBC/platelets.  She states that 6 days ago she began having chills and fever without many other symptoms.  She developed headaches and nausea, but no focal symptoms to identify source for fever.  She went to ED and was told it was likely a virus and told to take Tylenol and NSAIDS, which she did around the clock but fever would not subside.  Since admission she states that she is starting to feel a little better.

## 2022-05-29 NOTE — SUBJECTIVE & OBJECTIVE
Past Medical History:   Diagnosis Date    Anxiety     Depression     Kidney stones, age 16, 38 3/21/2018    Mixed hyperlipidemia 7/18/2019    Primary osteoarthritis of both knees 3/23/2021    Urinary tract infection 8/30/2013    Staphylococcus saprophyticus       Past Surgical History:   Procedure Laterality Date    COLONOSCOPY N/A 11/2/2018    Procedure: COLONOSCOPY;  Surgeon: Young Pink MD;  Location: [address]);  Service: Endoscopy;  Laterality: N/A;       Review of patient's allergies indicates:  No Known Allergies  Family History       Problem Relation (Age of Onset)    Breast cancer Maternal Uncle (60), Paternal Aunt (70)    Hypertension Mother, Father          Tobacco Use    Smoking status: Never Smoker    Smokeless tobacco: Never Used   Substance and Sexual Activity    Alcohol use: Yes     Alcohol/week: 2.0 standard drinks     Types: 2 Glasses of wine per week     Comment: socially    Drug use: No    Sexual activity: Yes     Partners: Male     Birth control/protection: None     Comment: Essure procedure 13 yrs ago     Review of Systems   Constitutional:  Positive for appetite change, chills, fatigue and fever.   Eyes:  Negative for photophobia and visual disturbance.   Respiratory:  Negative for chest tightness, shortness of breath and wheezing.    Cardiovascular:  Negative for chest pain, palpitations and leg swelling.   Gastrointestinal:  Positive for nausea. Negative for abdominal pain and blood in stool.   Genitourinary:  Negative for dysuria, flank pain and hematuria.   Musculoskeletal:  Negative for joint swelling and myalgias.   Skin:  Negative for color change and rash.   Neurological:  Negative for dizziness and speech difficulty.   Psychiatric/Behavioral:  Negative for confusion and hallucinations.    Objective:     Vital Signs (Most Recent):  Temp: 97.9 °F (36.6 °C) (05/29/22 1140)  Pulse: 70 (05/29/22 1140)  Resp: 20 (05/29/22 1140)  BP: 130/82 (05/29/22 1140)  SpO2: 98 % (05/29/22  1140) Vital Signs (24h Range):  Temp:  [97.5 °F (36.4 °C)-98.2 °F (36.8 °C)] 97.9 °F (36.6 °C)  Pulse:  [] 70  Resp:  [18-20] 20  SpO2:  [94 %-98 %] 98 %  BP: (123-150)/(82-86) 130/82     Weight: 97.2 kg (214 lb 4.6 oz) (05/29/22 0930)  Body mass index is 29.89 kg/m².      Intake/Output Summary (Last 24 hours) at 5/29/2022 1204  Last data filed at 5/29/2022 0900  Gross per 24 hour   Intake 120 ml   Output --   Net 120 ml       Lines/Drains/Airways       Peripheral Intravenous Line  Duration                  Peripheral IV - Single Lumen 05/28/22 0648 20 G Left Antecubital 1 day                    Physical Exam  Constitutional:       Appearance: Normal appearance. She is well-developed. She is not toxic-appearing.   HENT:      Head: Normocephalic and atraumatic.   Eyes:      Extraocular Movements: Extraocular movements intact.      Pupils: Pupils are equal, round, and reactive to light.   Pulmonary:      Effort: Pulmonary effort is normal. No respiratory distress.   Abdominal:      General: There is no distension.      Palpations: Abdomen is soft.   Musculoskeletal:         General: No signs of injury. Normal range of motion.      Cervical back: Normal range of motion and neck supple.   Neurological:      General: No focal deficit present.      Mental Status: She is alert and oriented to person, place, and time.   Psychiatric:         Mood and Affect: Mood normal.         Behavior: Behavior normal.         Thought Content: Thought content normal.         Judgment: Judgment normal.       Significant Labs:  CBC:   Recent Labs   Lab 05/28/22  0743 05/29/22 0612   WBC 1.67* 2.62*   HGB 12.2 12.6   HCT 35.2* 36.5*   * 147*     CMP:   Recent Labs   Lab 05/29/22 0612   *   CALCIUM 9.4   ALBUMIN 3.5   PROT 6.8      K 4.1   CO2 24      BUN 8   CREATININE 0.8   ALKPHOS 228*   *   *   BILITOT 2.0*     Coagulation:   Recent Labs   Lab 05/29/22 0612   INR 1.0     Lab Results    Component Value Date    HEPCAB Negative 07/30/2020     Acute hep panel pending    Significant Imaging:  Imaging results within the past 24 hours have been reviewed.  U/s with splenomegaly only, normal appearing liver and biliary system.

## 2022-05-29 NOTE — ASSESSMENT & PLAN NOTE
"She reports vague history of alcohol indulging (reports no abuse) years ago  Reports history of abnormal liver function and "fatty liver" years ago but again is vague in description and not provoking in responses when probed  Review of record does not support verbalizations  No bleeding  No bruising  She has been on Tylenol of unknown amount every 6 hours x 7 days    -avoid hepatotoxic agents including tylenol  -alcohol level, UDS  -PT/INR  -hepatitis panel  -consult GI  -CMP daily    Hep A/B/C negative  Gi note reviewed  Await hematology evaluation   Hopefully viral     "

## 2022-05-29 NOTE — PLAN OF CARE
Pt awake and alert throughout shift. Complaints of migraine with mild relief from PRN oxycodone. Regular diet, tolerated well. Pt ambulated up to toilet independently. Tele monitor NSR. Pt remained afebrile throughout shift. Liver enzymes remain elevated. Plan of care updated.       Problem: Adult Inpatient Plan of Care  Goal: Plan of Care Review  Outcome: Ongoing, Progressing     Problem: Adjustment to Illness (Sepsis/Septic Shock)  Goal: Optimal Coping  Outcome: Ongoing, Progressing     Problem: Bleeding (Sepsis/Septic Shock)  Goal: Absence of Bleeding  Outcome: Ongoing, Progressing     Problem: Infection Progression (Sepsis/Septic Shock)  Goal: Absence of Infection Signs and Symptoms  Outcome: Ongoing, Progressing

## 2022-05-29 NOTE — ASSESSMENT & PLAN NOTE
WBC 1.6; ANC calculates to ~ 1000 (moderate neutropenia)  Platelets 139,000  Mildly reduced RBC      No obvious illness  Symptoms of nausea and fever are vague at best  No new medications  No immunosuppressant home meds    Improving  Await hematology evaluation    -check HIV  -await hepatitis panel  -PT/INR  -May require hematology support if source is not readily identifiable

## 2022-05-30 LAB
ALBUMIN SERPL BCP-MCNC: 3.3 G/DL (ref 3.5–5.2)
ALP SERPL-CCNC: 287 U/L (ref 55–135)
ALT SERPL W/O P-5'-P-CCNC: 580 U/L (ref 10–44)
ANION GAP SERPL CALC-SCNC: 12 MMOL/L (ref 8–16)
AST SERPL-CCNC: 360 U/L (ref 10–40)
BASOPHILS NFR BLD: 3 % (ref 0–1.9)
BILIRUB SERPL-MCNC: 1.5 MG/DL (ref 0.1–1)
BUN SERPL-MCNC: 7 MG/DL (ref 6–20)
CALCIUM SERPL-MCNC: 8.8 MG/DL (ref 8.7–10.5)
CHLORIDE SERPL-SCNC: 103 MMOL/L (ref 95–110)
CO2 SERPL-SCNC: 23 MMOL/L (ref 23–29)
CREAT SERPL-MCNC: 0.7 MG/DL (ref 0.5–1.4)
DACRYOCYTES BLD QL SMEAR: ABNORMAL
DIFFERENTIAL METHOD: ABNORMAL
EOSINOPHIL NFR BLD: 0 % (ref 0–8)
ERYTHROCYTE [DISTWIDTH] IN BLOOD BY AUTOMATED COUNT: 11.8 % (ref 11.5–14.5)
EST. GFR  (AFRICAN AMERICAN): >60 ML/MIN/1.73 M^2
EST. GFR  (NON AFRICAN AMERICAN): >60 ML/MIN/1.73 M^2
GLUCOSE SERPL-MCNC: 116 MG/DL (ref 70–110)
HAV IGM SERPL QL IA: NEGATIVE
HBV CORE IGM SERPL QL IA: NEGATIVE
HBV SURFACE AG SERPL QL IA: NEGATIVE
HCT VFR BLD AUTO: 34.5 % (ref 37–48.5)
HCV AB SERPL QL IA: NEGATIVE
HGB BLD-MCNC: 11.7 G/DL (ref 12–16)
HIV 1+2 AB+HIV1 P24 AG SERPL QL IA: NEGATIVE
IMM GRANULOCYTES # BLD AUTO: ABNORMAL K/UL (ref 0–0.04)
IMM GRANULOCYTES NFR BLD AUTO: ABNORMAL % (ref 0–0.5)
LYMPHOCYTES NFR BLD: 41 % (ref 18–48)
MCH RBC QN AUTO: 32.2 PG (ref 27–31)
MCHC RBC AUTO-ENTMCNC: 33.9 G/DL (ref 32–36)
MCV RBC AUTO: 95 FL (ref 82–98)
MONOCYTES NFR BLD: 8 % (ref 4–15)
NEUTROPHILS NFR BLD: 33 % (ref 38–73)
NEUTS BAND NFR BLD MANUAL: 15 %
NRBC BLD-RTO: 0 /100 WBC
PLATELET # BLD AUTO: 173 K/UL (ref 150–450)
PLATELET BLD QL SMEAR: ABNORMAL
PMV BLD AUTO: 9.1 FL (ref 9.2–12.9)
POTASSIUM SERPL-SCNC: 3.8 MMOL/L (ref 3.5–5.1)
PROT SERPL-MCNC: 6.7 G/DL (ref 6–8.4)
RBC # BLD AUTO: 3.63 M/UL (ref 4–5.4)
SODIUM SERPL-SCNC: 138 MMOL/L (ref 136–145)
WBC # BLD AUTO: 3.35 K/UL (ref 3.9–12.7)

## 2022-05-30 PROCEDURE — 27000207 HC ISOLATION

## 2022-05-30 PROCEDURE — 36415 COLL VENOUS BLD VENIPUNCTURE: CPT | Performed by: NURSE PRACTITIONER

## 2022-05-30 PROCEDURE — 80053 COMPREHEN METABOLIC PANEL: CPT | Performed by: NURSE PRACTITIONER

## 2022-05-30 PROCEDURE — 94761 N-INVAS EAR/PLS OXIMETRY MLT: CPT

## 2022-05-30 PROCEDURE — 99900035 HC TECH TIME PER 15 MIN (STAT)

## 2022-05-30 PROCEDURE — 99232 PR SUBSEQUENT HOSPITAL CARE,LEVL II: ICD-10-PCS | Mod: NSCH,,, | Performed by: INTERNAL MEDICINE

## 2022-05-30 PROCEDURE — 25000242 PHARM REV CODE 250 ALT 637 W/ HCPCS: Performed by: NURSE PRACTITIONER

## 2022-05-30 PROCEDURE — 25000003 PHARM REV CODE 250: Performed by: NURSE PRACTITIONER

## 2022-05-30 PROCEDURE — 85007 BL SMEAR W/DIFF WBC COUNT: CPT | Performed by: NURSE PRACTITIONER

## 2022-05-30 PROCEDURE — 99232 SBSQ HOSP IP/OBS MODERATE 35: CPT | Mod: NSCH,,, | Performed by: INTERNAL MEDICINE

## 2022-05-30 PROCEDURE — 11000001 HC ACUTE MED/SURG PRIVATE ROOM

## 2022-05-30 PROCEDURE — 85027 COMPLETE CBC AUTOMATED: CPT | Performed by: NURSE PRACTITIONER

## 2022-05-30 RX ADMIN — BUTALBITAL, ACETAMINOPHEN, AND CAFFEINE 1 TABLET: 50; 325; 40 TABLET ORAL at 08:05

## 2022-05-30 RX ADMIN — ACETYLCYSTEINE 6680 MG: 200 SOLUTION ORAL; RESPIRATORY (INHALATION) at 05:05

## 2022-05-30 RX ADMIN — FAMOTIDINE 20 MG: 20 TABLET, FILM COATED ORAL at 09:05

## 2022-05-30 RX ADMIN — ACETYLCYSTEINE 6680 MG: 200 SOLUTION ORAL; RESPIRATORY (INHALATION) at 09:05

## 2022-05-30 RX ADMIN — FAMOTIDINE 20 MG: 20 TABLET, FILM COATED ORAL at 08:05

## 2022-05-30 RX ADMIN — BUTALBITAL, ACETAMINOPHEN, AND CAFFEINE 1 TABLET: 50; 325; 40 TABLET ORAL at 02:05

## 2022-05-30 RX ADMIN — ACETYLCYSTEINE 6680 MG: 200 SOLUTION ORAL; RESPIRATORY (INHALATION) at 01:05

## 2022-05-30 NOTE — PROGRESS NOTES
"[facility]    CC: pancytopenia    HPI 52 y.o. female with PMH significant for depression, presented with a 1 week history of acute onset nausea with associated daily fevers, frontal headache earlier in the week that has since resolved, & up to 102F not relieved with home alternating Tylenol and Ibuprofen every 3 hours. In the ED she was found to have pancytopenia with WBC of 1.6, platelets of 139,000 on CBC, ALk phos 199 (baseline 70's)  (baseline 26)  (baseline 33), normal procalcitonin, normal lactic acid, UA with proteinuria/occult blood/&RBCs. Abdominal US with mild splenomegaly (14 cms) with normal appearing liver and biliary system. ANC calculation ~ 1000. She was admitted to the medical surgical unit for evaluation of abnormal liver function with moderate neutropenia + pancytopenia.    Patient reports 1 episode of vomiting this morning, and nausea, but denies abdominal pain, blood in the vomit or stools, or diarrhea. She reports taking excedrin once a month for migraines, but normally does not take any medications. She had a colonoscopy in 11/2018, found to have a polyp and told to repeat in 5 years. Denies weight loss, appetite changes. Remains afebrile during this admission.    Past Medical History  Past Medical History:   Diagnosis Date    Anxiety     Depression     Kidney stones, age 16, 38 3/21/2018    Mixed hyperlipidemia 7/18/2019    Primary osteoarthritis of both knees 3/23/2021    Urinary tract infection 8/30/2013    Staphylococcus saprophyticus         Review of Systems  General ROS: negative for chills, fever or weight loss  Cardiovascular ROS: no chest pain or dyspnea on exertion  Gastrointestinal ROS: no abdominal pain, change in bowel habits, or black/ bloody stools; +N/V    Physical Examination  /89 (BP Location: Left arm, Patient Position: Sitting)   Pulse 72   Temp 98.3 °F (36.8 °C) (Oral)   Resp 17   Ht 5' 11" (1.803 m)   Wt 93.2 kg (205 lb 7.5 oz)   " SpO2 95%   BMI 28.66 kg/m²   General appearance: alert, cooperative, no distress  HENT: Normocephalic, atraumatic, neck symmetrical, no nasal discharge  Eyes: no scleral icterus, EOMI, PERRL  Lungs: clear to auscultation bilaterally, no dullness to percussion bilaterally  Heart: regular rate and rhythm without rub; no displacement of the PMI   Abdomen: soft, non-tender; non-distended, dullness to percussion, bowel sounds normoactive; no organomegaly  Extremities: extremities symmetric; no clubbing, cyanosis, or edema  Neurologic: Alert and oriented X 3, normal sensation, normal coordination    Recent Labs   Lab 05/30/22  0533   WBC 3.35*   RBC 3.63*   HGB 11.7*   HCT 34.5*      MCV 95   MCH 32.2*   MCHC 33.9       Recent Labs   Lab 05/30/22  0533      K 3.8      CO2 23   *   BUN 7   CREATININE 0.7     Recent Labs   Lab 05/30/22  0533   PROT 6.7   ALBUMIN 3.3*   BILITOT 1.5*   *   *   ALKPHOS 287*       Review and summarization of old records    Assessment:   Harpreet Peralta is a 51 yo F with PMH significant for depression, presented with a 1 week history of acute onset nausea with associated daily fevers, frontal headache, found to have pancytopenia with WBC of 1.6, platelets of 139,000 on CBC, ALk phos 199 (baseline 70's)  (baseline 26)  (baseline 33), normal procalcitonin, normal lactic acid, UA with proteinuria/occult blood/&RBCs. Abdominal US with mild splenomegaly (14 cms) with normal appearing liver and biliary system. Patient endorsed Tylenol use in the past week for fevers, and started on NAC as elevated liver enzymes may be due to excessive Tylenol use.     Plan:  - Abnormal liver function may be 2/2 excessive Tylenol use vs NAFLD  - improving liver enzymes and bilirubin; continue to monitor  - complete NAC tx today, can stop tomorrow   - Avoid hepatotoxic meds  - Recommend further hematologic eval for leukopenia   - Due for repeat colonoscopy  11/2023    Attestation to follow which may differ from above plan. Please appreciate.    Lu Anaya  [facility], PGY-2

## 2022-05-30 NOTE — PLAN OF CARE
Pt awake and alert between care throughout the shift. C/o migraine at beginning of shift and throughout the night, PRN Fiorcet administered with minimal relief. Regular diet, tolerated well. Pt ambulated up to toilet independently. Tele monitor NSR. Pt remained afebrile throughout shift. Liver enzymes remain elevated. Plan of care updated.     Problem: Adult Inpatient Plan of Care  Goal: Plan of Care Review  Outcome: Ongoing, Progressing     Problem: Bleeding (Sepsis/Septic Shock)  Goal: Absence of Bleeding  Outcome: Ongoing, Progressing     Problem: Nutrition Impaired (Sepsis/Septic Shock)  Goal: Optimal Nutrition Intake  Outcome: Ongoing, Progressing     Problem: Nausea and Vomiting  Goal: Fluid and Electrolyte Balance  Outcome: Ongoing, Progressing

## 2022-05-30 NOTE — PLAN OF CARE
Pt is AAOx4. Pt given medications as ordered per MAR. PRN Fiorcet given for pain. Pt tolerating regular diet well. Pt ambulating to toilet independently. Cardiac monitoring in place. Neutropenic precautions remain in place. Safety maintained. Instructed to usee call light for assistance. Will continue to monitor.           Problem: Adult Inpatient Plan of Care  Goal: Optimal Comfort and Wellbeing  Outcome: Ongoing, Progressing     Problem: Pain Acute  Goal: Acceptable Pain Control and Functional Ability  Outcome: Ongoing, Progressing

## 2022-05-30 NOTE — ASSESSMENT & PLAN NOTE
-possibly viral in etiology  -continue supportive care for now  -trend daily CBC's  -will cont to follow with you

## 2022-05-30 NOTE — CONSULTS
[facility]  H[facility]/On[facility]  Consult Note    Patient Name: Harpreet Peralta  MRN: [id number]  Admission Date: 5/28/2022  Hospital Length of Stay: 1 days  Code Status: Full Code   Attending Provider: Kody Pascal, *  Consulting Provider: Jl Miles MD  Primary Care Physician: Katelin Guerra MD  Principal Problem:Pancytopenia    Inpatient consult to Hematology/Oncology  Consult performed by: Jl Miles MD  Consult ordered by: Candelaria Stover NP        Subjective:     HPI:  51 yo female presented to ER with nausea, heddaches, fever (Tmax 102 F). Found to have elevated LFTs and decreased WBC/platelets.      Oncology Treatment Plan:   [Could not find a treatment plan. This SmartLink may be configured incorrectly. Contact a  for help.]    Medications:  Continuous Infusions:  Scheduled Meds:   acetylcysteine 200 mg/ml (20%)  70 mg/kg (Dosing Weight) Oral TID    famotidine  20 mg Oral BID     PRN Meds:butalbital-acetaminophen-caffeine -40 mg, melatonin, ondansetron, oxyCODONE, sodium chloride 0.9%     Review of patient's allergies indicates:  No Known Allergies     Past Medical History:   Diagnosis Date    Anxiety     Depression     Kidney stones, age 16, 38 3/21/2018    Mixed hyperlipidemia 7/18/2019    Primary osteoarthritis of both knees 3/23/2021    Urinary tract infection 8/30/2013    Staphylococcus saprophyticus     Past Surgical History:   Procedure Laterality Date    COLONOSCOPY N/A 11/2/2018    Procedure: COLONOSCOPY;  Surgeon: Young Pink MD;  Location: [address];  Service: Endoscopy;  Laterality: N/A;     Family History       Problem Relation (Age of Onset)    Breast cancer Maternal Uncle (60), Paternal Aunt (70)    Hypertension Mother, Father          Tobacco Use    Smoking status: Never Smoker    Smokeless tobacco: Never Used   Substance and Sexual Activity    Alcohol use: Yes     Alcohol/week: 2.0 standard drinks     Types: 2  Glasses of wine per week     Comment: socially    Drug use: No    Sexual activity: Yes     Partners: Male     Birth control/protection: None     Comment: Essure procedure 13 yrs ago       Review of Systems   Constitutional:  Positive for fatigue and fever.   Musculoskeletal:  Positive for arthralgias and myalgias.   Psychiatric/Behavioral:  The patient is nervous/anxious.    All other systems reviewed and are negative.  Objective:     Vital Signs (Most Recent):  Temp: 98.1 °F (36.7 °C) (05/29/22 2312)  Pulse: 73 (05/29/22 2312)  Resp: 18 (05/29/22 1911)  BP: 112/71 (05/29/22 2312)  SpO2: (!) 93 % (05/29/22 2312)   Vital Signs (24h Range):  Temp:  [97.3 °F (36.3 °C)-99.1 °F (37.3 °C)] 98.1 °F (36.7 °C)  Pulse:  [] 73  Resp:  [18-20] 18  SpO2:  [93 %-98 %] 93 %  BP: (112-150)/(71-88) 112/71     Weight: 93.2 kg (205 lb 7.5 oz)  Body mass index is 28.66 kg/m².  Body surface area is 2.16 meters squared.      Intake/Output Summary (Last 24 hours) at 5/29/2022 2352  Last data filed at 5/29/2022 1700  Gross per 24 hour   Intake 240 ml   Output --   Net 240 ml       Physical Exam  Vitals and nursing note reviewed.   Constitutional:       General: She is awake. She is not in acute distress.     Appearance: Normal appearance. She is well-developed.   HENT:      Head: Normocephalic and atraumatic.   Cardiovascular:      Rate and Rhythm: Normal rate and regular rhythm.   Pulmonary:      Effort: Pulmonary effort is normal. No respiratory distress.      Breath sounds: Normal breath sounds. No stridor. No rhonchi.   Neurological:      General: No focal deficit present.      Mental Status: She is alert and oriented to person, place, and time. Mental status is at baseline.       Significant Labs:   All pertinent labs from the last 24 hours have been reviewed.    Diagnostic Results:  I have reviewed all pertinent imaging results/findings within the past 24 hours.    Assessment/Plan:     * Pancytopenia  -possibly viral in  etiology  -continue supportive care for now  -trend daily CBC's  -will cont to follow with you        Thank you for your consult. I will follow-up with patient. Please contact us if you have any additional questions.    Jl Miles MD  Hematology/Oncology  [facility]

## 2022-05-30 NOTE — SUBJECTIVE & OBJECTIVE
Oncology Treatment Plan:   [Could not find a treatment plan. This SmartLink may be configured incorrectly. Contact a  for help.]    Medications:  Continuous Infusions:  Scheduled Meds:   acetylcysteine 200 mg/ml (20%)  70 mg/kg (Dosing Weight) Oral TID    famotidine  20 mg Oral BID     PRN Meds:butalbital-acetaminophen-caffeine -40 mg, melatonin, ondansetron, oxyCODONE, sodium chloride 0.9%     Review of patient's allergies indicates:  No Known Allergies     Past Medical History:   Diagnosis Date    Anxiety     Depression     Kidney stones, age 16, 38 3/21/2018    Mixed hyperlipidemia 7/18/2019    Primary osteoarthritis of both knees 3/23/2021    Urinary tract infection 8/30/2013    Staphylococcus saprophyticus     Past Surgical History:   Procedure Laterality Date    COLONOSCOPY N/A 11/2/2018    Procedure: COLONOSCOPY;  Surgeon: Young Pink MD;  Location: [address];  Service: Endoscopy;  Laterality: N/A;     Family History       Problem Relation (Age of Onset)    Breast cancer Maternal Uncle (60), Paternal Aunt (70)    Hypertension Mother, Father          Tobacco Use    Smoking status: Never Smoker    Smokeless tobacco: Never Used   Substance and Sexual Activity    Alcohol use: Yes     Alcohol/week: 2.0 standard drinks     Types: 2 Glasses of wine per week     Comment: socially    Drug use: No    Sexual activity: Yes     Partners: Male     Birth control/protection: None     Comment: Essure procedure 13 yrs ago       Review of Systems   Constitutional:  Positive for fatigue and fever.   Musculoskeletal:  Positive for arthralgias and myalgias.   Psychiatric/Behavioral:  The patient is nervous/anxious.    All other systems reviewed and are negative.  Objective:     Vital Signs (Most Recent):  Temp: 98.1 °F (36.7 °C) (05/29/22 2312)  Pulse: 73 (05/29/22 2312)  Resp: 18 (05/29/22 1911)  BP: 112/71 (05/29/22 2312)  SpO2: (!) 93 % (05/29/22 2312)   Vital Signs (24h Range):  Temp:   [97.3 °F (36.3 °C)-99.1 °F (37.3 °C)] 98.1 °F (36.7 °C)  Pulse:  [] 73  Resp:  [18-20] 18  SpO2:  [93 %-98 %] 93 %  BP: (112-150)/(71-88) 112/71     Weight: 93.2 kg (205 lb 7.5 oz)  Body mass index is 28.66 kg/m².  Body surface area is 2.16 meters squared.      Intake/Output Summary (Last 24 hours) at 5/29/2022 2352  Last data filed at 5/29/2022 1700  Gross per 24 hour   Intake 240 ml   Output --   Net 240 ml       Physical Exam  Vitals and nursing note reviewed.   Constitutional:       General: She is awake. She is not in acute distress.     Appearance: Normal appearance. She is well-developed.   HENT:      Head: Normocephalic and atraumatic.   Cardiovascular:      Rate and Rhythm: Normal rate and regular rhythm.   Pulmonary:      Effort: Pulmonary effort is normal. No respiratory distress.      Breath sounds: Normal breath sounds. No stridor. No rhonchi.   Neurological:      General: No focal deficit present.      Mental Status: She is alert and oriented to person, place, and time. Mental status is at baseline.       Significant Labs:   All pertinent labs from the last 24 hours have been reviewed.    Diagnostic Results:  I have reviewed all pertinent imaging results/findings within the past 24 hours.

## 2022-05-30 NOTE — SUBJECTIVE & OBJECTIVE
Interval History:   No fevers  Nausea persistent but better than presentation  Neutropenia resolved  Hep A/B/C + HIV negative  Liver function trending downward today  GI recommends one more day of Mucomyst with discontinuation tomorrow and to trend liver function   Awaiting hematology evaluation    Review of Systems   Constitutional: Negative.    HENT: Negative.     Eyes: Negative.  Negative for visual disturbance.   Respiratory: Negative.     Cardiovascular: Negative.    Gastrointestinal: Negative.    Endocrine: Negative.    Genitourinary: Negative.    Musculoskeletal:  Negative for arthralgias, back pain, gait problem, joint swelling, myalgias, neck pain and neck stiffness.   Skin: Negative.    Allergic/Immunologic: Negative.    Neurological:  Positive for headaches (intermittent). Negative for dizziness, tremors, seizures, syncope, facial asymmetry, speech difficulty, weakness, light-headedness and numbness.   Hematological: Negative.    Psychiatric/Behavioral: Negative.     Objective:     Vital Signs (Most Recent):  Temp: 98.7 °F (37.1 °C) (05/30/22 1145)  Pulse: (!) 59 (05/30/22 1145)  Resp: 17 (05/30/22 1145)  BP: (!) 140/76 (05/30/22 1145)  SpO2: 97 % (05/30/22 1145) Vital Signs (24h Range):  Temp:  [97.3 °F (36.3 °C)-99.1 °F (37.3 °C)] 98.7 °F (37.1 °C)  Pulse:  [59-92] 59  Resp:  [17-18] 17  SpO2:  [93 %-98 %] 97 %  BP: (112-145)/(71-89) 140/76     Weight: 93.2 kg (205 lb 7.5 oz)  Body mass index is 28.66 kg/m².    Intake/Output Summary (Last 24 hours) at 5/30/2022 1414  Last data filed at 5/29/2022 1700  Gross per 24 hour   Intake 120 ml   Output --   Net 120 ml        Physical Exam  Constitutional:       Appearance: Normal appearance.   HENT:      Head: Normocephalic and atraumatic.      Nose: Nose normal.      Mouth/Throat:      Mouth: Mucous membranes are moist.   Eyes:      General: No scleral icterus.     Extraocular Movements: Extraocular movements intact.      Pupils: Pupils are equal, round, and  reactive to light.   Cardiovascular:      Rate and Rhythm: Normal rate and regular rhythm.      Pulses: Normal pulses.      Heart sounds: Normal heart sounds.   Pulmonary:      Effort: Pulmonary effort is normal.      Breath sounds: Normal breath sounds.   Abdominal:      General: Bowel sounds are normal.      Palpations: Abdomen is soft.      Tenderness: There is no abdominal tenderness. There is no guarding or rebound.      Hernia: No hernia is present.   Genitourinary:     Comments: deferred  Musculoskeletal:         General: Normal range of motion.      Cervical back: Normal range of motion and neck supple.   Skin:     General: Skin is warm and dry.      Capillary Refill: Capillary refill takes less than 2 seconds.   Neurological:      Mental Status: She is alert and oriented to person, place, and time.   Psychiatric:         Mood and Affect: Mood normal.       Significant Labs: All pertinent labs within the past 24 hours have been reviewed.  CBC:   Recent Labs   Lab 05/29/22  0612 05/30/22  0533   WBC 2.62* 3.35*   HGB 12.6 11.7*   HCT 36.5* 34.5*   * 173       CMP:   Recent Labs   Lab 05/29/22  0612 05/30/22  0533    138   K 4.1 3.8    103   CO2 24 23   * 116*   BUN 8 7   CREATININE 0.8 0.7   CALCIUM 9.4 8.8   PROT 6.8 6.7   ALBUMIN 3.5 3.3*   BILITOT 2.0* 1.5*   ALKPHOS 228* 287*   * 360*   * 580*   ANIONGAP 11 12   EGFRNONAA >60 >60       Lab Results   Component Value Date    HEPAIGM Negative 05/28/2022    HEPBIGM Negative 05/28/2022    HEPCAB Negative 05/28/2022         Microbiology Results (last 7 days)       Procedure Component Value Units Date/Time    Blood culture x two cultures. Draw prior to antibiotics. [[id number]] Collected: 05/28/22 0742    Order Status: Completed Specimen: Blood from Peripheral, Antecubital, Left Updated: 05/30/22 1012     Blood Culture, Routine No Growth to date      No Growth to date      No Growth to date    Narrative:      Aerobic and  anaerobic    Blood culture x two cultures. Draw prior to antibiotics. [[id number]] Collected: 05/28/22 0802    Order Status: Completed Specimen: Blood from Peripheral, Forearm, Right Updated: 05/29/22 1812     Blood Culture, Routine No Growth to date      No Growth to date    Narrative:      Aerobic and anaerobic    Influenza A & B by Molecular [[id number]] Collected: 05/28/22 0739    Order Status: Completed Specimen: Nasopharyngeal Swab Updated: 05/28/22 0853     Influenza A, Molecular Negative     Influenza B, Molecular Negative     Flu A & B Source Nasal swab            Significant Imaging: I have reviewed all pertinent imaging results/findings within the past 24 hours.

## 2022-05-30 NOTE — PLAN OF CARE
CM met with pt and SO Reese Schumacher  [phone]   independent prior to admit - no dme, no hh      dx:  Leukopenia       pt's pcp has retired.      Future Appointments   Date Time Provider Department Center   6/13/2022  4:00 PM Claudia Fountain MD [facility]        05/30/22 1805   Discharge Planning   Assessment Type Discharge Planning Brief Assessment   Resource/Environmental Concerns none   Support Systems Spouse/significant other   Equipment Currently Used at Home none   Current Living Arrangements home/apartment/condo   Patient/Family Anticipates Transition to home;home with family   Patient/Family Anticipated Services at Transition none   DME Needed Upon Discharge  none   Discharge Plan A Home;Home with family

## 2022-05-30 NOTE — HPI
53 yo female presented to ER with nausea, heddaches, fever (Tmax 102 F). Found to have elevated LFTs and decreased WBC/platelets.

## 2022-05-30 NOTE — PROGRESS NOTES
"[facility]  Progress Note    Patient Name: Harpreet Peralta  MRN: [id number]  Patient Class: IP- Inpatient   Admission Date: 5/28/2022  Length of Stay: 2 days  Attending Physician: Kody Pascal, *  Primary Care Provider: Katelin Guerra MD        Subjective:     Principal Problem:Pancytopenia        HPI:  This is a pleasant 52 year old WF with a history of depression in remission on Celexa who presented to the ED with CC of a one week  history of acute onset nausea with associated daily fevers, frontal headache earlier in the week that has since resolved, & up to 102F not relieved with home alternating Tylenol and Ibuprofen every 3 hours. She explains awakening Monday with symptoms as above. She visited Urgent care who told her she likely had a "stomach virus" and to alternate Tylenol and Ibuprofen for fevers. She chatted with her PCP's colleague (PCP retired) who advised her to do the same. She visited with a virtual nurse via phone who also confirmed treatment as above. By this am, when symptoms were no better and fever was not breaking with alternating meds as below, she presented to the ED for further evaluation "something is not right". She denies abdominal pain, chest pain, cough, dyspnea, trauma, diarrhea, prolonged headaches, myalgias, neck pain, changes to bladder habits, previous episodes. She denies out of the country travel or sick contacts. 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 No abnormal bruising or bleeding. Denies new medications. She reports elevated liver enzymes years ago with her PCP who told her to stop her weekend drinking. She reports abnormal liver imaging at that time "my doctor said it was fat". Review of records does not show any of reported "years" prior liver function serum or imaging. Hepatic panel was normal one year ago. No pertinent FH. She was found in the ED to have pancytopenia with WBC " of 1.6, platelets of 139,000 on CBC, ALk phos 199 (baseline 70's)  (baseline 26)  (baseline 33), normal procalcitonin, normal lactic acid, UA with proteinuria/occult blood/&RBCs. Abdominal US with mild splenomegaly (14 cms) with normal appearing liver and biliary system. ANC calculation ~ 1000. Blood cultures were collected. ID consulted for neutropenia. Hepatitis panel requested. She was admitted to the medical surgical unit for evaluation of abnormal liver function with moderate neutropenia + pancytopenia. Awaiting ID and GI evaluation.       Overview/Hospital Course:  Presented with acute onset persistent fevers up to 102F and nauseas without abdominal pain  Found to have abnormal liver function and pancytopenia with moderate neutropenia  She denies any new medications, known sick contacts, or recent illness  Abdominal US showed normal liver and ducts and mild splenomegaly  HIV, Hepatitis Panel, INR requested  GI and Hematology requested  GI briefly reviewed records and considering ATc Tylenol usage recommended Mucomyst initiation until full consult to follow  Unfortunately, Mucomyst perceived as started, not officially started until this am    5/29/2022  Hepatitis A/B/C negative   HIV pending  GI consulted who no longer thinks this is tylenol related but recommends continued mucomyst for now; hopeful for viral etiology but concern for myelodysplastic conditions exist  She is no longer neutropenic today; ANC 2000  Plan for discontinuation of neutropenic precaution if she remains with stable ANC tomorrow  ID evaluated the patient and believes her presenting symptoms are consistent with viral process; considering her resolution of neutropenia had no more to add to case  Hematology consult requested and pending evaluation    5/30/2022  Still with some nausea  HIV negative  Hep A/B/C non reactive  She is no longer neutropenic  Leukopenia persists but improving  ID, GI, hematology believes abnormal liver  function could be viral in nature  Hepatic function trending downward but still pretty elevated  Blood cultures NGTD  GI recommends continued hepatic trend for now  OK to stop Mucomyst tomorrow, continue today--> per GI  She has a sever migraine with photophobia currently, prefers to take her Excedrin today but willing to try migraine cocktail (Toradol/compazine/Benadryl IV) if Excedrin not successful with aborting migraine       Interval History:   No fevers  Nausea persistent but better than presentation  Neutropenia resolved  Hep A/B/C + HIV negative  Liver function trending downward today  GI recommends one more day of Mucomyst with discontinuation tomorrow and to trend liver function   Awaiting hematology evaluation    Review of Systems   Constitutional: Negative.    HENT: Negative.     Eyes: Negative.  Negative for visual disturbance.   Respiratory: Negative.     Cardiovascular: Negative.    Gastrointestinal: Negative.    Endocrine: Negative.    Genitourinary: Negative.    Musculoskeletal:  Negative for arthralgias, back pain, gait problem, joint swelling, myalgias, neck pain and neck stiffness.   Skin: Negative.    Allergic/Immunologic: Negative.    Neurological:  Positive for headaches (intermittent). Negative for dizziness, tremors, seizures, syncope, facial asymmetry, speech difficulty, weakness, light-headedness and numbness.   Hematological: Negative.    Psychiatric/Behavioral: Negative.     Objective:     Vital Signs (Most Recent):  Temp: 98.7 °F (37.1 °C) (05/30/22 1145)  Pulse: (!) 59 (05/30/22 1145)  Resp: 17 (05/30/22 1145)  BP: (!) 140/76 (05/30/22 1145)  SpO2: 97 % (05/30/22 1145) Vital Signs (24h Range):  Temp:  [97.3 °F (36.3 °C)-99.1 °F (37.3 °C)] 98.7 °F (37.1 °C)  Pulse:  [59-92] 59  Resp:  [17-18] 17  SpO2:  [93 %-98 %] 97 %  BP: (112-145)/(71-89) 140/76     Weight: 93.2 kg (205 lb 7.5 oz)  Body mass index is 28.66 kg/m².    Intake/Output Summary (Last 24 hours) at 5/30/2022 1414  Last data  filed at 5/29/2022 1700  Gross per 24 hour   Intake 120 ml   Output --   Net 120 ml        Physical Exam  Constitutional:       Appearance: Normal appearance.   HENT:      Head: Normocephalic and atraumatic.      Nose: Nose normal.      Mouth/Throat:      Mouth: Mucous membranes are moist.   Eyes:      General: No scleral icterus.     Extraocular Movements: Extraocular movements intact.      Pupils: Pupils are equal, round, and reactive to light.   Cardiovascular:      Rate and Rhythm: Normal rate and regular rhythm.      Pulses: Normal pulses.      Heart sounds: Normal heart sounds.   Pulmonary:      Effort: Pulmonary effort is normal.      Breath sounds: Normal breath sounds.   Abdominal:      General: Bowel sounds are normal.      Palpations: Abdomen is soft.      Tenderness: There is no abdominal tenderness. There is no guarding or rebound.      Hernia: No hernia is present.   Genitourinary:     Comments: deferred  Musculoskeletal:         General: Normal range of motion.      Cervical back: Normal range of motion and neck supple.   Skin:     General: Skin is warm and dry.      Capillary Refill: Capillary refill takes less than 2 seconds.   Neurological:      Mental Status: She is alert and oriented to person, place, and time.   Psychiatric:         Mood and Affect: Mood normal.       Significant Labs: All pertinent labs within the past 24 hours have been reviewed.  CBC:   Recent Labs   Lab 05/29/22  0612 05/30/22  0533   WBC 2.62* 3.35*   HGB 12.6 11.7*   HCT 36.5* 34.5*   * 173       CMP:   Recent Labs   Lab 05/29/22  0612 05/30/22  0533    138   K 4.1 3.8    103   CO2 24 23   * 116*   BUN 8 7   CREATININE 0.8 0.7   CALCIUM 9.4 8.8   PROT 6.8 6.7   ALBUMIN 3.5 3.3*   BILITOT 2.0* 1.5*   ALKPHOS 228* 287*   * 360*   * 580*   ANIONGAP 11 12   EGFRNONAA >60 >60       Lab Results   Component Value Date    HEPAIGM Negative 05/28/2022    HEPBIGM Negative 05/28/2022    HEPCAB  "Negative 05/28/2022         Microbiology Results (last 7 days)       Procedure Component Value Units Date/Time    Blood culture x two cultures. Draw prior to antibiotics. [[id number]] Collected: 05/28/22 0742    Order Status: Completed Specimen: Blood from Peripheral, Antecubital, Left Updated: 05/30/22 1012     Blood Culture, Routine No Growth to date      No Growth to date      No Growth to date    Narrative:      Aerobic and anaerobic    Blood culture x two cultures. Draw prior to antibiotics. [[id number]] Collected: 05/28/22 0802    Order Status: Completed Specimen: Blood from Peripheral, Forearm, Right Updated: 05/29/22 1812     Blood Culture, Routine No Growth to date      No Growth to date    Narrative:      Aerobic and anaerobic    Influenza A & B by Molecular [[id number]] Collected: 05/28/22 0739    Order Status: Completed Specimen: Nasopharyngeal Swab Updated: 05/28/22 0853     Influenza A, Molecular Negative     Influenza B, Molecular Negative     Flu A & B Source Nasal swab            Significant Imaging: I have reviewed all pertinent imaging results/findings within the past 24 hours.      Assessment/Plan:      * Pancytopenia  WBC 1.6; ANC calculates to ~ 1000 (moderate neutropenia)  Platelets 139,000  Mildly reduced RBC      No obvious illness  Symptoms of nausea and fever are vague at best  No new medications  No immunosuppressant home meds    Improving  Await hematology evaluation    -check HIV  -await hepatitis panel  -PT/INR  -May require hematology support if source is not readily identifiable      Abnormal liver function  She reports vague history of alcohol indulging (reports no abuse) years ago  Reports history of abnormal liver function and "fatty liver" years ago but again is vague in description and not provoking in responses when probed  Review of record does not support verbalizations  No bleeding  No bruising  She has been on Tylenol of unknown amount every 6 hours x 7 days    -avoid " hepatotoxic agents including tylenol  -alcohol level, UDS  -PT/INR  -hepatitis panel  -consult GI  -CMP daily    Hep A/B/C negative  Gi note reviewed  Await hematology evaluation   Hopefully viral       Neutropenia  WBC 1.6  No bands  Neutrophil 58%  No left shift  No obvious illness  Symptoms of nausea and fever are vague at best  No new medications  No immunosuppressant home meds  ANC calculates to ~ 1000 (moderate neutropenia)    -check HIV  -neutropenic precautions  -Consult ID to surveil need for empiric BS antibiotics for now +/- antifungal   -Daily CBC with diff with ANC calculation      Resolved   Await HIV   Await hematology evaluation  Hopeful for identification of source soon          VTE Risk Mitigation (From admission, onward)         Ordered     IP VTE LOW RISK PATIENT  Once         05/28/22 1152     Place MARIANNA hose  Until discontinued         05/28/22 1152                Discharge Planning   SHARAD:      Code Status: Full Code   Is the patient medically ready for discharge?:     Reason for patient still in hospital (select all that apply): Patient trending condition and Laboratory test                     Candelaria Stover NP  Department of Hospital Medicine   [facility]

## 2022-05-31 LAB
ALBUMIN SERPL BCP-MCNC: 3.2 G/DL (ref 3.5–5.2)
ALBUMIN SERPL BCP-MCNC: 3.5 G/DL (ref 3.5–5.2)
ALP SERPL-CCNC: 293 U/L (ref 55–135)
ALP SERPL-CCNC: 326 U/L (ref 55–135)
ALT SERPL W/O P-5'-P-CCNC: 550 U/L (ref 10–44)
ALT SERPL W/O P-5'-P-CCNC: 625 U/L (ref 10–44)
ANION GAP SERPL CALC-SCNC: 10 MMOL/L (ref 8–16)
ANION GAP SERPL CALC-SCNC: 13 MMOL/L (ref 8–16)
AST SERPL-CCNC: 272 U/L (ref 10–40)
AST SERPL-CCNC: 342 U/L (ref 10–40)
BASOPHILS # BLD AUTO: 0.03 K/UL (ref 0–0.2)
BASOPHILS NFR BLD: 0.7 % (ref 0–1.9)
BILIRUB SERPL-MCNC: 0.7 MG/DL (ref 0.1–1)
BILIRUB SERPL-MCNC: 0.9 MG/DL (ref 0.1–1)
BUN SERPL-MCNC: 13 MG/DL (ref 6–20)
BUN SERPL-MCNC: 15 MG/DL (ref 6–20)
CALCIUM SERPL-MCNC: 9.1 MG/DL (ref 8.7–10.5)
CALCIUM SERPL-MCNC: 9.3 MG/DL (ref 8.7–10.5)
CHLORIDE SERPL-SCNC: 102 MMOL/L (ref 95–110)
CHLORIDE SERPL-SCNC: 103 MMOL/L (ref 95–110)
CO2 SERPL-SCNC: 23 MMOL/L (ref 23–29)
CO2 SERPL-SCNC: 27 MMOL/L (ref 23–29)
CREAT SERPL-MCNC: 0.7 MG/DL (ref 0.5–1.4)
CREAT SERPL-MCNC: 0.7 MG/DL (ref 0.5–1.4)
DIFFERENTIAL METHOD: ABNORMAL
EOSINOPHIL # BLD AUTO: 0.1 K/UL (ref 0–0.5)
EOSINOPHIL NFR BLD: 2.1 % (ref 0–8)
ERYTHROCYTE [DISTWIDTH] IN BLOOD BY AUTOMATED COUNT: 11.9 % (ref 11.5–14.5)
EST. GFR  (AFRICAN AMERICAN): >60 ML/MIN/1.73 M^2
EST. GFR  (AFRICAN AMERICAN): >60 ML/MIN/1.73 M^2
EST. GFR  (NON AFRICAN AMERICAN): >60 ML/MIN/1.73 M^2
EST. GFR  (NON AFRICAN AMERICAN): >60 ML/MIN/1.73 M^2
GLUCOSE SERPL-MCNC: 100 MG/DL (ref 70–110)
GLUCOSE SERPL-MCNC: 94 MG/DL (ref 70–110)
HCT VFR BLD AUTO: 36.2 % (ref 37–48.5)
HGB BLD-MCNC: 12.1 G/DL (ref 12–16)
IMM GRANULOCYTES # BLD AUTO: 0.03 K/UL (ref 0–0.04)
IMM GRANULOCYTES NFR BLD AUTO: 0.7 % (ref 0–0.5)
LYMPHOCYTES # BLD AUTO: 1.9 K/UL (ref 1–4.8)
LYMPHOCYTES NFR BLD: 44.1 % (ref 18–48)
MCH RBC QN AUTO: 32.2 PG (ref 27–31)
MCHC RBC AUTO-ENTMCNC: 33.4 G/DL (ref 32–36)
MCV RBC AUTO: 96 FL (ref 82–98)
MONOCYTES # BLD AUTO: 0.4 K/UL (ref 0.3–1)
MONOCYTES NFR BLD: 9.2 % (ref 4–15)
NEUTROPHILS # BLD AUTO: 1.9 K/UL (ref 1.8–7.7)
NEUTROPHILS NFR BLD: 43.2 % (ref 38–73)
NRBC BLD-RTO: 0 /100 WBC
PLATELET # BLD AUTO: 232 K/UL (ref 150–450)
PMV BLD AUTO: 9.7 FL (ref 9.2–12.9)
POTASSIUM SERPL-SCNC: 3.9 MMOL/L (ref 3.5–5.1)
POTASSIUM SERPL-SCNC: 4.6 MMOL/L (ref 3.5–5.1)
PROT SERPL-MCNC: 7 G/DL (ref 6–8.4)
PROT SERPL-MCNC: 7.1 G/DL (ref 6–8.4)
RBC # BLD AUTO: 3.76 M/UL (ref 4–5.4)
SODIUM SERPL-SCNC: 139 MMOL/L (ref 136–145)
SODIUM SERPL-SCNC: 139 MMOL/L (ref 136–145)
WBC # BLD AUTO: 4.33 K/UL (ref 3.9–12.7)

## 2022-05-31 PROCEDURE — 25000003 PHARM REV CODE 250: Performed by: NURSE PRACTITIONER

## 2022-05-31 PROCEDURE — 80053 COMPREHEN METABOLIC PANEL: CPT | Mod: 91 | Performed by: INTERNAL MEDICINE

## 2022-05-31 PROCEDURE — 99232 PR SUBSEQUENT HOSPITAL CARE,LEVL II: ICD-10-PCS | Mod: NSCH,,, | Performed by: INTERNAL MEDICINE

## 2022-05-31 PROCEDURE — 99232 SBSQ HOSP IP/OBS MODERATE 35: CPT | Mod: NSCH,,, | Performed by: INTERNAL MEDICINE

## 2022-05-31 PROCEDURE — 21400001 HC TELEMETRY ROOM

## 2022-05-31 PROCEDURE — 36415 COLL VENOUS BLD VENIPUNCTURE: CPT | Performed by: INTERNAL MEDICINE

## 2022-05-31 PROCEDURE — 94761 N-INVAS EAR/PLS OXIMETRY MLT: CPT

## 2022-05-31 PROCEDURE — 25000242 PHARM REV CODE 250 ALT 637 W/ HCPCS: Performed by: NURSE PRACTITIONER

## 2022-05-31 PROCEDURE — 85025 COMPLETE CBC W/AUTO DIFF WBC: CPT | Performed by: NURSE PRACTITIONER

## 2022-05-31 PROCEDURE — 80053 COMPREHEN METABOLIC PANEL: CPT | Performed by: NURSE PRACTITIONER

## 2022-05-31 RX ORDER — IBUPROFEN 400 MG/1
400 TABLET ORAL EVERY 6 HOURS PRN
Status: DISCONTINUED | OUTPATIENT
Start: 2022-05-31 | End: 2022-06-01 | Stop reason: HOSPADM

## 2022-05-31 RX ADMIN — FAMOTIDINE 20 MG: 20 TABLET, FILM COATED ORAL at 08:05

## 2022-05-31 RX ADMIN — ACETYLCYSTEINE 6680 MG: 200 SOLUTION ORAL; RESPIRATORY (INHALATION) at 05:05

## 2022-05-31 RX ADMIN — FAMOTIDINE 20 MG: 20 TABLET, FILM COATED ORAL at 09:05

## 2022-05-31 RX ADMIN — ACETYLCYSTEINE 6680 MG: 200 SOLUTION ORAL; RESPIRATORY (INHALATION) at 08:05

## 2022-05-31 RX ADMIN — ACETYLCYSTEINE 6680 MG: 200 SOLUTION ORAL; RESPIRATORY (INHALATION) at 02:05

## 2022-05-31 NOTE — PLAN OF CARE
Chart reveiwed / case discussed in am MD meeting   GI consulted for elevated liver enzymes  - poss Tylenol OD    monitoring labs;  plan per GI:  if liver enzymes do not improve tomorrow after complete avoidance of hepatotoxic meds, will consider further imaging   will need f/u with GI/pcp       SO Reese Schumacher  [phone]   independent prior to admit - no dme, no hh       dx:  Leukopenia            05/31/22 1728   Post-Acute Status   Post-Acute Authorization Other   Other Status No Post-Acute Service Needs

## 2022-05-31 NOTE — PROGRESS NOTES
"[facility]    CC: elevation in liver enzymes    HPI 52 y.o. female with PMH significant for depression GI consulted for acute, mild elevation in liver enzymes noted on arrival to ER that is associated with nausea, emesisi, and recent tylenol ingestion in setting of headaches and fever    Past Medical History  Past Medical History:   Diagnosis Date    Anxiety     Depression     Kidney stones, age 16, 38 3/21/2018    Mixed hyperlipidemia 7/18/2019    Primary osteoarthritis of both knees 3/23/2021    Urinary tract infection 8/30/2013    Staphylococcus saprophyticus         Review of Systems  General ROS: negative for chills, fever or weight loss  Cardiovascular ROS: no chest pain or dyspnea on exertion  Gastrointestinal ROS: no abdominal pain, change in bowel habits, or black/ bloody stools    Physical Examination  /72   Pulse 70   Temp 98.3 °F (36.8 °C)   Resp 17   Ht 5' 11" (1.803 m)   Wt 93.2 kg (205 lb 7.5 oz)   SpO2 97%   BMI 28.66 kg/m²   General appearance: alert, cooperative, no distress  HENT: Normocephalic, atraumatic, neck symmetrical, no nasal discharge  Eyes: no scleral icterus, EOMI, PERRL  Lungs: clear to auscultation bilaterally, no dullness to percussion bilaterally  Heart: regular rate and rhythm without rub; no displacement of the PMI   Abdomen: soft, non-tender; non-distended, dullness to percussion, bowel sounds normoactive; no organomegaly  Extremities: extremities symmetric; no clubbing, cyanosis, or edema  Neurologic: Alert and oriented X 3, normal sensation, normal coordination    Recent Labs   Lab 05/31/22  0509   WBC 4.33   RBC 3.76*   HGB 12.1   HCT 36.2*      MCV 96   MCH 32.2*   MCHC 33.4       Recent Labs   Lab 05/31/22  0509      K 4.6      CO2 27   GLU 94   BUN 13   CREATININE 0.7     Recent Labs   Lab 05/31/22  0509   PROT 7.1   ALBUMIN 3.5   BILITOT 0.9   *   *   ALKPHOS 326*       Review and summarization of old " records    Assessment:   52 y.o. female with PMH significant for depression GI consulted for elevation in liver enzymes associated with nausea and non bloody emesis. Tylenol ingestion for fever. Colonoscopy 2018 with one polyp and told to repeat in 5 years.    #Elevated liver studies - etiology most likely from recent tylenol ingestion vs infectious vs ischemia vs metabolic (ie, fatty liver) vs choledocholithiasis that passed on its own. Acute hepatitis panel negative. RUQ U/S only denotes mild splenomegaly    Plan:  -completely avoid hepatotoxic meds; GI stopped PRN fiorcet as this contains tylenol  -extend NAC therapy for 24 more hours  -if liver enzymes do not improve tomorrow after complete avoidance of hepatotoxic meds, will consider further imaging      Attestation to follow which may differ from above plan. Please appreciate.    Noel Razo MD  [facility]

## 2022-06-01 ENCOUNTER — TELEPHONE (OUTPATIENT)
Dept: GASTROENTEROLOGY | Facility: CLINIC | Age: 52
End: 2022-06-01
Payer: COMMERCIAL

## 2022-06-01 VITALS
TEMPERATURE: 98 F | RESPIRATION RATE: 18 BRPM | SYSTOLIC BLOOD PRESSURE: 132 MMHG | HEART RATE: 78 BPM | BODY MASS INDEX: 29.66 KG/M2 | WEIGHT: 211.88 LBS | HEIGHT: 71 IN | OXYGEN SATURATION: 93 % | DIASTOLIC BLOOD PRESSURE: 75 MMHG

## 2022-06-01 LAB
ALBUMIN SERPL BCP-MCNC: 3.3 G/DL (ref 3.5–5.2)
ALP SERPL-CCNC: 326 U/L (ref 55–135)
ALT SERPL W/O P-5'-P-CCNC: 508 U/L (ref 10–44)
ANION GAP SERPL CALC-SCNC: 14 MMOL/L (ref 8–16)
AST SERPL-CCNC: 199 U/L (ref 10–40)
BASOPHILS # BLD AUTO: 0.04 K/UL (ref 0–0.2)
BASOPHILS NFR BLD: 0.7 % (ref 0–1.9)
BILIRUB SERPL-MCNC: 0.7 MG/DL (ref 0.1–1)
BUN SERPL-MCNC: 10 MG/DL (ref 6–20)
CALCIUM SERPL-MCNC: 9 MG/DL (ref 8.7–10.5)
CHLORIDE SERPL-SCNC: 105 MMOL/L (ref 95–110)
CO2 SERPL-SCNC: 21 MMOL/L (ref 23–29)
CREAT SERPL-MCNC: 0.6 MG/DL (ref 0.5–1.4)
DIFFERENTIAL METHOD: ABNORMAL
EOSINOPHIL # BLD AUTO: 0.1 K/UL (ref 0–0.5)
EOSINOPHIL NFR BLD: 2.3 % (ref 0–8)
ERYTHROCYTE [DISTWIDTH] IN BLOOD BY AUTOMATED COUNT: 12 % (ref 11.5–14.5)
EST. GFR  (AFRICAN AMERICAN): >60 ML/MIN/1.73 M^2
EST. GFR  (NON AFRICAN AMERICAN): >60 ML/MIN/1.73 M^2
GLUCOSE SERPL-MCNC: 100 MG/DL (ref 70–110)
HCT VFR BLD AUTO: 33.3 % (ref 37–48.5)
HGB BLD-MCNC: 11.2 G/DL (ref 12–16)
IMM GRANULOCYTES # BLD AUTO: 0.05 K/UL (ref 0–0.04)
IMM GRANULOCYTES NFR BLD AUTO: 0.9 % (ref 0–0.5)
LYMPHOCYTES # BLD AUTO: 2 K/UL (ref 1–4.8)
LYMPHOCYTES NFR BLD: 35.9 % (ref 18–48)
MCH RBC QN AUTO: 32.2 PG (ref 27–31)
MCHC RBC AUTO-ENTMCNC: 33.6 G/DL (ref 32–36)
MCV RBC AUTO: 96 FL (ref 82–98)
MONOCYTES # BLD AUTO: 0.5 K/UL (ref 0.3–1)
MONOCYTES NFR BLD: 9.4 % (ref 4–15)
NEUTROPHILS # BLD AUTO: 2.9 K/UL (ref 1.8–7.7)
NEUTROPHILS NFR BLD: 50.8 % (ref 38–73)
NRBC BLD-RTO: 0 /100 WBC
PLATELET # BLD AUTO: 285 K/UL (ref 150–450)
PMV BLD AUTO: 9.6 FL (ref 9.2–12.9)
POTASSIUM SERPL-SCNC: 4.2 MMOL/L (ref 3.5–5.1)
PROT SERPL-MCNC: 6.6 G/DL (ref 6–8.4)
RBC # BLD AUTO: 3.48 M/UL (ref 4–5.4)
SODIUM SERPL-SCNC: 140 MMOL/L (ref 136–145)
WBC # BLD AUTO: 5.66 K/UL (ref 3.9–12.7)

## 2022-06-01 PROCEDURE — 85025 COMPLETE CBC W/AUTO DIFF WBC: CPT | Performed by: NURSE PRACTITIONER

## 2022-06-01 PROCEDURE — 25000242 PHARM REV CODE 250 ALT 637 W/ HCPCS: Performed by: NURSE PRACTITIONER

## 2022-06-01 PROCEDURE — 80053 COMPREHEN METABOLIC PANEL: CPT | Performed by: NURSE PRACTITIONER

## 2022-06-01 PROCEDURE — 25000003 PHARM REV CODE 250: Performed by: INTERNAL MEDICINE

## 2022-06-01 PROCEDURE — 94761 N-INVAS EAR/PLS OXIMETRY MLT: CPT

## 2022-06-01 PROCEDURE — 25000003 PHARM REV CODE 250: Performed by: NURSE PRACTITIONER

## 2022-06-01 PROCEDURE — 36415 COLL VENOUS BLD VENIPUNCTURE: CPT | Performed by: NURSE PRACTITIONER

## 2022-06-01 RX ORDER — SUMATRIPTAN SUCCINATE 25 MG/1
50 TABLET ORAL ONCE
Status: COMPLETED | OUTPATIENT
Start: 2022-06-01 | End: 2022-06-01

## 2022-06-01 RX ORDER — SUMATRIPTAN 20 MG/1
1 SPRAY NASAL
Qty: 6 EACH | Refills: 1 | Status: SHIPPED | OUTPATIENT
Start: 2022-06-01 | End: 2022-10-04

## 2022-06-01 RX ADMIN — FAMOTIDINE 20 MG: 20 TABLET, FILM COATED ORAL at 08:06

## 2022-06-01 RX ADMIN — SUMATRIPTAN SUCCINATE 50 MG: 25 TABLET ORAL at 12:06

## 2022-06-01 RX ADMIN — ACETYLCYSTEINE 6680 MG: 200 SOLUTION ORAL; RESPIRATORY (INHALATION) at 05:06

## 2022-06-01 RX ADMIN — IBUPROFEN 400 MG: 400 TABLET, FILM COATED ORAL at 11:06

## 2022-06-01 NOTE — ASSESSMENT & PLAN NOTE
WBC 1.6  No bands  Neutrophil 58%  No left shift  No obvious illness  Symptoms of nausea and fever are vague at best  No new medications  No immunosuppressant home meds  ANC calculates to ~ 1000 (moderate neutropenia)  Resolved     -check HIV  -neutropenic precautions  -Consult ID to surveil need for empiric BS antibiotics for now +/- antifungal   -Daily CBC with diff with ANC calculation      Resolved   Await HIV   Await hematology evaluation  Hopeful for identification of source soon

## 2022-06-01 NOTE — TREATMENT PLAN
[facility] RN met with patient and pt's spouse regarding [facility] hospital follow up upon discharge. Pt agreeable to hospital follow up .RN informed pt of scheduled appointment and that appointment will also appear on d/c AVS. Patient informed to bring portable home O2 if used and all medication bottles to P[facility] follow up appointment. Pt states she will drive herself to appointment.    Patient Contact info:[phone]    Person providing transportation contact info: [phone]     Barriers to attending P[facility] visit: none identified    Future Appointments   Date Time Provider Department Center   6/13/2022  4:00 PM Claudia Fountain MD [facility] OBGYN [facility]   6/15/2022  1:00 PM Linda Kerr MD [facility] IMPRI [facility]   6/23/2022  3:00 PM Jl Miles MD [facility] HEM ONC [facility]

## 2022-06-01 NOTE — SUBJECTIVE & OBJECTIVE
Interval History: Remains afebrile. Denies abd pain, n/v. Bili cont to trend down, however LFT trending up. Pt has been taking fioricet as well as excedrin containing tylenol which is likely exacerbating care home. Mucomyst continued.     Review of Systems   Constitutional: Negative.    HENT: Negative.     Eyes: Negative.  Negative for visual disturbance.   Respiratory: Negative.     Cardiovascular: Negative.    Gastrointestinal: Negative.    Endocrine: Negative.    Genitourinary: Negative.    Musculoskeletal:  Negative for arthralgias, back pain, gait problem, joint swelling, myalgias, neck pain and neck stiffness.   Skin: Negative.    Allergic/Immunologic: Negative.    Neurological:  Positive for headaches (intermittent). Negative for dizziness, tremors, seizures, syncope, facial asymmetry, speech difficulty, weakness, light-headedness and numbness.   Hematological: Negative.    Psychiatric/Behavioral: Negative.     Objective:     Vital Signs (Most Recent):  Temp: 98 °F (36.7 °C) (05/31/22 1919)  Pulse: 62 (05/31/22 2000)  Resp: 18 (05/31/22 1919)  BP: 134/76 (05/31/22 1919)  SpO2: 97 % (05/31/22 1919) Vital Signs (24h Range):  Temp:  [97.8 °F (36.6 °C)-98.5 °F (36.9 °C)] 98 °F (36.7 °C)  Pulse:  [58-71] 62  Resp:  [17-19] 18  SpO2:  [94 %-99 %] 97 %  BP: (125-153)/(72-92) 134/76     Weight: 93.2 kg (205 lb 7.5 oz)  Body mass index is 28.66 kg/m².    Intake/Output Summary (Last 24 hours) at 5/31/2022 2200  Last data filed at 5/31/2022 1200  Gross per 24 hour   Intake 480 ml   Output --   Net 480 ml        Physical Exam  Constitutional:       Appearance: Normal appearance.   HENT:      Head: Normocephalic and atraumatic.      Nose: Nose normal.      Mouth/Throat:      Mouth: Mucous membranes are moist.   Eyes:      General: No scleral icterus.     Extraocular Movements: Extraocular movements intact.      Pupils: Pupils are equal, round, and reactive to light.   Cardiovascular:      Rate and Rhythm: Normal rate and  regular rhythm.      Pulses: Normal pulses.      Heart sounds: Normal heart sounds.   Pulmonary:      Effort: Pulmonary effort is normal.      Breath sounds: Normal breath sounds.   Abdominal:      General: Bowel sounds are normal.      Palpations: Abdomen is soft.      Tenderness: There is no abdominal tenderness. There is no guarding or rebound.      Hernia: No hernia is present.   Genitourinary:     Comments: deferred  Musculoskeletal:         General: Normal range of motion.      Cervical back: Normal range of motion and neck supple.   Skin:     General: Skin is warm and dry.      Capillary Refill: Capillary refill takes less than 2 seconds.   Neurological:      Mental Status: She is alert and oriented to person, place, and time.   Psychiatric:         Mood and Affect: Mood normal.       Significant Labs: All pertinent labs within the past 24 hours have been reviewed.  CBC:   Recent Labs   Lab 05/30/22 0533 05/31/22  0509   WBC 3.35* 4.33   HGB 11.7* 12.1   HCT 34.5* 36.2*    232       CMP:   Recent Labs   Lab 05/30/22 0533 05/31/22  0509 05/31/22  1435    139 139   K 3.8 4.6 3.9    102 103   CO2 23 27 23   * 94 100   BUN 7 13 15   CREATININE 0.7 0.7 0.7   CALCIUM 8.8 9.3 9.1   PROT 6.7 7.1 7.0   ALBUMIN 3.3* 3.5 3.2*   BILITOT 1.5* 0.9 0.7   ALKPHOS 287* 326* 293*   * 342* 272*   * 625* 550*   ANIONGAP 12 10 13   EGFRNONAA >60 >60 >60       Lab Results   Component Value Date    HEPAIGM Negative 05/28/2022    HEPBIGM Negative 05/28/2022    HEPCAB Negative 05/28/2022         Microbiology Results (last 7 days)       Procedure Component Value Units Date/Time    Blood culture x two cultures. Draw prior to antibiotics. [[id number]] Collected: 05/28/22 0802    Order Status: Completed Specimen: Blood from Peripheral, Forearm, Right Updated: 05/31/22 1812     Blood Culture, Routine No Growth to date      No Growth to date      No Growth to date      No Growth to date     Narrative:      Aerobic and anaerobic    Blood culture x two cultures. Draw prior to antibiotics. [[id number]] Collected: 05/28/22 0742    Order Status: Completed Specimen: Blood from Peripheral, Antecubital, Left Updated: 05/31/22 1012     Blood Culture, Routine No Growth to date      No Growth to date      No Growth to date      No Growth to date    Narrative:      Aerobic and anaerobic    Influenza A & B by Molecular [[id number]] Collected: 05/28/22 0739    Order Status: Completed Specimen: Nasopharyngeal Swab Updated: 05/28/22 0853     Influenza A, Molecular Negative     Influenza B, Molecular Negative     Flu A & B Source Nasal swab            Significant Imaging: I have reviewed all pertinent imaging results/findings within the past 24 hours.

## 2022-06-01 NOTE — DISCHARGE INSTRUCTIONS
DO NOT TAKE TYLENOL OR TYLENOL CONTAINING PRODUCTS          [facility]    We are committed to providing you the best follow-up care after discharge from the hospital. You have a scheduled hospital follow up appointment with the [facility]. Our team will review your hospital diagnosis, treatment and medications. We coordinate care with your Primary Care Provider. We plan to follow you closely for approximately 30-60 days from your hospital discharge date. Your first appointment will be an hour long and each additional appointment is thirty minutes. You will see our Internal Medicine physician, Dr. Linda Kerr. Please contact us for any medically related problems, including change in your health status, and medication issues; we will make an effort to address these needs expeditiously.    [phone]

## 2022-06-01 NOTE — PROGRESS NOTES
Future Appointments   Date Time Provider Department Center   6/13/2022  4:00 PM Claudia Fountain MD [facility] OBGYN [facility]   6/23/2022  3:00 PM Jl Miles MD [facility] HEM ONC [facility]

## 2022-06-01 NOTE — PLAN OF CARE
Pt meets criteria for discharge. VSS. AVS given to pt, states understanding of discharge instructions. Telemetry and PIV removed. Prescriptions sent to pts pharmacy. Pt to be discharged to home.

## 2022-06-01 NOTE — ASSESSMENT & PLAN NOTE
"She reports vague history of alcohol indulging (reports no abuse) years ago  Reports history of abnormal liver function and "fatty liver" years ago but again is vague in description and not provoking in responses when probed  Review of record does not support verbalizations  No bleeding  No bruising  She has been on Tylenol of unknown amount every 6 hours x 7 days    -avoid hepatotoxic agents including tylenol  -alcohol level, UDS  -PT/INR  -hepatitis panel  -consult GI  -CMP daily    Hep A/B/C negative  Gi note reviewed  Hopefully viral   Likely CHCF induced by tylenol toxicity  Remove all meds containing tylenol  Cont mucomyst   Supportive care  Trend LFTs    "

## 2022-06-01 NOTE — PROGRESS NOTES
"[facility] Medicine  Progress Note    Patient Name: Harpreet Peralta  MRN: [id number]  Patient Class: IP- Inpatient   Admission Date: 5/28/2022  Length of Stay: 3 days  Attending Physician: Wing Nunez MD  Primary Care Provider: Katelin Guerra MD        Subjective:     Principal Problem:Pancytopenia        HPI:  This is a pleasant 52 year old WF with a history of depression in remission on Celexa who presented to the ED with CC of a one week  history of acute onset nausea with associated daily fevers, frontal headache earlier in the week that has since resolved, & up to 102F not relieved with home alternating Tylenol and Ibuprofen every 3 hours. She explains awakening Monday with symptoms as above. She visited Urgent care who told her she likely had a "stomach virus" and to alternate Tylenol and Ibuprofen for fevers. She chatted with her PCP's colleague (PCP retired) who advised her to do the same. She visited with a virtual nurse via phone who also confirmed treatment as above. By this am, when symptoms were no better and fever was not breaking with alternating meds as below, she presented to the ED for further evaluation "something is not right". She denies abdominal pain, chest pain, cough, dyspnea, trauma, diarrhea, prolonged headaches, myalgias, neck pain, changes to bladder habits, previous episodes. She denies out of the country travel or sick contacts. 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 No abnormal bruising or bleeding. Denies new medications. She reports elevated liver enzymes years ago with her PCP who told her to stop her weekend drinking. She reports abnormal liver imaging at that time "my doctor said it was fat". Review of records does not show any of reported "years" prior liver function serum or imaging. Hepatic panel was normal one year ago. No pertinent FH. She was found in the ED to have pancytopenia with WBC of " 1.6, platelets of 139,000 on CBC, ALk phos 199 (baseline 70's)  (baseline 26)  (baseline 33), normal procalcitonin, normal lactic acid, UA with proteinuria/occult blood/&RBCs. Abdominal US with mild splenomegaly (14 cms) with normal appearing liver and biliary system. ANC calculation ~ 1000. Blood cultures were collected. ID consulted for neutropenia. Hepatitis panel requested. She was admitted to the medical surgical unit for evaluation of abnormal liver function with moderate neutropenia + pancytopenia. Awaiting ID and GI evaluation.       Overview/Hospital Course:  Presented with acute onset persistent fevers up to 102F and nauseas without abdominal pain  Found to have abnormal liver function and pancytopenia with moderate neutropenia  She denies any new medications, known sick contacts, or recent illness  Abdominal US showed normal liver and ducts and mild splenomegaly  HIV, Hepatitis Panel, INR requested  GI and Hematology requested  GI briefly reviewed records and considering ATc Tylenol usage recommended Mucomyst initiation until full consult to follow  Unfortunately, Mucomyst perceived as started, not officially started until this am    5/29/2022  Hepatitis A/B/C negative   HIV pending  GI consulted who no longer thinks this is tylenol related but recommends continued mucomyst for now; hopeful for viral etiology but concern for myelodysplastic conditions exist  She is no longer neutropenic today; ANC 2000  Plan for discontinuation of neutropenic precaution if she remains with stable ANC tomorrow  ID evaluated the patient and believes her presenting symptoms are consistent with viral process; considering her resolution of neutropenia had no more to add to case  Hematology consult requested and pending evaluation    5/30/2022  Still with some nausea  HIV negative  Hep A/B/C non reactive  She is no longer neutropenic  Leukopenia persists but improving  ID, GI, hematology believes abnormal liver  function could be viral in nature  Hepatic function trending downward but still pretty elevated  Blood cultures NGTD  GI recommends continued hepatic trend for now  OK to stop Mucomyst tomorrow, continue today--> per GI  She has a sever migraine with photophobia currently, prefers to take her Excedrin today but willing to try migraine cocktail (Toradol/compazine/Benadryl IV) if Excedrin not successful with aborting migraine       Interval History: Remains afebrile. Denies abd pain, n/v. Bili cont to trend down, however LFT trending up. Pt has been taking fioricet as well as excedrin containing tylenol which is likely exacerbating care home. Mucomyst continued.     Review of Systems   Constitutional: Negative.    HENT: Negative.     Eyes: Negative.  Negative for visual disturbance.   Respiratory: Negative.     Cardiovascular: Negative.    Gastrointestinal: Negative.    Endocrine: Negative.    Genitourinary: Negative.    Musculoskeletal:  Negative for arthralgias, back pain, gait problem, joint swelling, myalgias, neck pain and neck stiffness.   Skin: Negative.    Allergic/Immunologic: Negative.    Neurological:  Positive for headaches (intermittent). Negative for dizziness, tremors, seizures, syncope, facial asymmetry, speech difficulty, weakness, light-headedness and numbness.   Hematological: Negative.    Psychiatric/Behavioral: Negative.     Objective:     Vital Signs (Most Recent):  Temp: 98 °F (36.7 °C) (05/31/22 1919)  Pulse: 62 (05/31/22 2000)  Resp: 18 (05/31/22 1919)  BP: 134/76 (05/31/22 1919)  SpO2: 97 % (05/31/22 1919) Vital Signs (24h Range):  Temp:  [97.8 °F (36.6 °C)-98.5 °F (36.9 °C)] 98 °F (36.7 °C)  Pulse:  [58-71] 62  Resp:  [17-19] 18  SpO2:  [94 %-99 %] 97 %  BP: (125-153)/(72-92) 134/76     Weight: 93.2 kg (205 lb 7.5 oz)  Body mass index is 28.66 kg/m².    Intake/Output Summary (Last 24 hours) at 5/31/2022 2200  Last data filed at 5/31/2022 1200  Gross per 24 hour   Intake 480 ml   Output --   Net  480 ml        Physical Exam  Constitutional:       Appearance: Normal appearance.   HENT:      Head: Normocephalic and atraumatic.      Nose: Nose normal.      Mouth/Throat:      Mouth: Mucous membranes are moist.   Eyes:      General: No scleral icterus.     Extraocular Movements: Extraocular movements intact.      Pupils: Pupils are equal, round, and reactive to light.   Cardiovascular:      Rate and Rhythm: Normal rate and regular rhythm.      Pulses: Normal pulses.      Heart sounds: Normal heart sounds.   Pulmonary:      Effort: Pulmonary effort is normal.      Breath sounds: Normal breath sounds.   Abdominal:      General: Bowel sounds are normal.      Palpations: Abdomen is soft.      Tenderness: There is no abdominal tenderness. There is no guarding or rebound.      Hernia: No hernia is present.   Genitourinary:     Comments: deferred  Musculoskeletal:         General: Normal range of motion.      Cervical back: Normal range of motion and neck supple.   Skin:     General: Skin is warm and dry.      Capillary Refill: Capillary refill takes less than 2 seconds.   Neurological:      Mental Status: She is alert and oriented to person, place, and time.   Psychiatric:         Mood and Affect: Mood normal.       Significant Labs: All pertinent labs within the past 24 hours have been reviewed.  CBC:   Recent Labs   Lab 05/30/22  0533 05/31/22  0509   WBC 3.35* 4.33   HGB 11.7* 12.1   HCT 34.5* 36.2*    232       CMP:   Recent Labs   Lab 05/30/22  0533 05/31/22  0509 05/31/22  1435    139 139   K 3.8 4.6 3.9    102 103   CO2 23 27 23   * 94 100   BUN 7 13 15   CREATININE 0.7 0.7 0.7   CALCIUM 8.8 9.3 9.1   PROT 6.7 7.1 7.0   ALBUMIN 3.3* 3.5 3.2*   BILITOT 1.5* 0.9 0.7   ALKPHOS 287* 326* 293*   * 342* 272*   * 625* 550*   ANIONGAP 12 10 13   EGFRNONAA >60 >60 >60       Lab Results   Component Value Date    HEPAIGM Negative 05/28/2022    HEPBIGM Negative 05/28/2022    HEPCAB  "Negative 05/28/2022         Microbiology Results (last 7 days)       Procedure Component Value Units Date/Time    Blood culture x two cultures. Draw prior to antibiotics. [[id number]] Collected: 05/28/22 0802    Order Status: Completed Specimen: Blood from Peripheral, Forearm, Right Updated: 05/31/22 1812     Blood Culture, Routine No Growth to date      No Growth to date      No Growth to date      No Growth to date    Narrative:      Aerobic and anaerobic    Blood culture x two cultures. Draw prior to antibiotics. [[id number]] Collected: 05/28/22 0742    Order Status: Completed Specimen: Blood from Peripheral, Antecubital, Left Updated: 05/31/22 1012     Blood Culture, Routine No Growth to date      No Growth to date      No Growth to date      No Growth to date    Narrative:      Aerobic and anaerobic    Influenza A & B by Molecular [[id number]] Collected: 05/28/22 0739    Order Status: Completed Specimen: Nasopharyngeal Swab Updated: 05/28/22 0853     Influenza A, Molecular Negative     Influenza B, Molecular Negative     Flu A & B Source Nasal swab            Significant Imaging: I have reviewed all pertinent imaging results/findings within the past 24 hours.      Assessment/Plan:      * Pancytopenia  WBC 1.6; ANC calculates to ~ 1000 (moderate neutropenia)  Platelets 139,000  Mildly reduced RBC    No obvious illness  Symptoms of nausea and fever are vague at best  No new medications  No immunosuppressant home meds    Improving  Await hematology evaluation    -HIV and hepatitis panel negative  -May require hematology support if source is not readily identifiable  -Resolving    Abnormal liver function  She reports vague history of alcohol indulging (reports no abuse) years ago  Reports history of abnormal liver function and "fatty liver" years ago but again is vague in description and not provoking in responses when probed  Review of record does not support verbalizations  No bleeding  No bruising  She has been on " Tylenol of unknown amount every 6 hours x 7 days    -avoid hepatotoxic agents including tylenol  -alcohol level, UDS  -PT/INR  -hepatitis panel  -consult GI  -CMP daily    Hep A/B/C negative  Gi note reviewed  Hopefully viral   Likely LB induced by tylenol toxicity  Remove all meds containing tylenol  Cont mucomyst   Supportive care  Trend LFTs      Neutropenia  WBC 1.6  No bands  Neutrophil 58%  No left shift  No obvious illness  Symptoms of nausea and fever are vague at best  No new medications  No immunosuppressant home meds  ANC calculates to ~ 1000 (moderate neutropenia)  Resolved     -check HIV  -neutropenic precautions  -Consult ID to surveil need for empiric BS antibiotics for now +/- antifungal   -Daily CBC with diff with ANC calculation      Resolved   Await HIV   Await hematology evaluation  Hopeful for identification of source soon          VTE Risk Mitigation (From admission, onward)         Ordered     IP VTE LOW RISK PATIENT  Once         05/28/22 1152     Place MARIANNA hose  Until discontinued         05/28/22 1152                Discharge Planning   SHARAD:      Code Status: Full Code   Is the patient medically ready for discharge?:     Reason for patient still in hospital (select all that apply): Patient trending condition, Laboratory test and Treatment  Discharge Plan A: Home, Home with family                  Wing Nunez MD  Department of Hospital Medicine   [place] - Med Surg

## 2022-06-01 NOTE — ASSESSMENT & PLAN NOTE
WBC 1.6; ANC calculates to ~ 1000 (moderate neutropenia)  Platelets 139,000  Mildly reduced RBC    No obvious illness  Symptoms of nausea and fever are vague at best  No new medications  No immunosuppressant home meds    Improving  Await hematology evaluation    -HIV and hepatitis panel negative  -May require hematology support if source is not readily identifiable  -Resolving

## 2022-06-01 NOTE — TELEPHONE ENCOUNTER
----- Message from Shayna Holder sent at 6/1/2022 10:35 AM CDT -----  Regarding: HFU  Patient is being discharged from [facility] and is requiring a hospital follow up with Dr Rowe as close to 14 days out as possible.  I am unable to schedule an appointment within that time frame. Please schedule a sooner appointment and message me back so Discharge Nurse can advise patient prior to discharge.    DX: Abdominal Pain    Thank you, Jillian    Access Navigator/Discharge

## 2022-06-01 NOTE — DISCHARGE SUMMARY
"[facility] Medicine  Discharge Summary      Patient Name: Harpreet Peralta  MRN: [id number]  Patient Class: IP- Inpatient  Admission Date: 5/28/2022  Hospital Length of Stay: 4 days  Discharge Date and Time: 6/1/2022  3:29 PM  Attending Physician: No att. providers found   Discharging Provider: Wing Nunez MD  Primary Care Provider: Katelin Guerra MD      HPI:   This is a pleasant 52 year old WF with a history of depression in remission on Celexa who presented to the ED with CC of a one week  history of acute onset nausea with associated daily fevers, frontal headache earlier in the week that has since resolved, & up to 102F not relieved with home alternating Tylenol and Ibuprofen every 3 hours. She explains awakening Monday with symptoms as above. She visited Urgent care who told her she likely had a "stomach virus" and to alternate Tylenol and Ibuprofen for fevers. She chatted with her PCP's colleague (PCP retired) who advised her to do the same. She visited with a virtual nurse via phone who also confirmed treatment as above. By this am, when symptoms were no better and fever was not breaking with alternating meds as below, she presented to the ED for further evaluation "something is not right". She denies abdominal pain, chest pain, cough, dyspnea, trauma, diarrhea, prolonged headaches, myalgias, neck pain, changes to bladder habits, previous episodes. She denies out of the country travel or sick contacts. 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 No abnormal bruising or bleeding. Denies new medications. She reports elevated liver enzymes years ago with her PCP who told her to stop her weekend drinking. She reports abnormal liver imaging at that time "my doctor said it was fat". Review of records does not show any of reported "years" prior liver function serum or imaging. Hepatic panel was normal one year ago. No pertinent FH. She was " found in the ED to have pancytopenia with WBC of 1.6, platelets of 139,000 on CBC, ALk phos 199 (baseline 70's)  (baseline 26)  (baseline 33), normal procalcitonin, normal lactic acid, UA with proteinuria/occult blood/&RBCs. Abdominal US with mild splenomegaly (14 cms) with normal appearing liver and biliary system. ANC calculation ~ 1000. Blood cultures were collected. ID consulted for neutropenia. Hepatitis panel requested. She was admitted to the medical surgical unit for evaluation of abnormal liver function with moderate neutropenia + pancytopenia. Awaiting ID and GI evaluation.       * No surgery found *      Hospital Course:   Presented with acute onset persistent fevers up to 102F and nauseas without abdominal pain  Found to have abnormal liver function and pancytopenia with moderate neutropenia  She denies any new medications, known sick contacts, or recent illness  Abdominal US showed normal liver and ducts and mild splenomegaly  HIV, Hepatitis Panel, INR requested  GI and Hematology requested  GI briefly reviewed records and considering ATc Tylenol usage recommended Mucomyst initiation until full consult to follow  Unfortunately, Mucomyst perceived as started, not officially started until this am    5/29/2022  Hepatitis A/B/C negative   HIV pending  GI consulted who no longer thinks this is tylenol related but recommends continued mucomyst for now; hopeful for viral etiology but concern for myelodysplastic conditions exist  She is no longer neutropenic today; ANC 2000  Plan for discontinuation of neutropenic precaution if she remains with stable ANC tomorrow  ID evaluated the patient and believes her presenting symptoms are consistent with viral process; considering her resolution of neutropenia had no more to add to case  Hematology consult requested and pending evaluation    5/30/2022  Still with some nausea  HIV negative  Hep A/B/C non reactive  She is no longer neutropenic  Leukopenia  persists but improving  ID, GI, hematology believes abnormal liver function could be viral in nature  Hepatic function trending downward but still pretty elevated  Blood cultures NGTD  GI recommends continued hepatic trend for now  OK to stop Mucomyst tomorrow, continue today--> per GI  She has a sever migraine with photophobia currently, prefers to take her Excedrin today but willing to try migraine cocktail (Toradol/compazine/Benadryl IV) if Excedrin not successful with aborting migraine     5/31/22  Bili trending down, however noted to have worsening ALT likely 2/2 continued ingestion of tylenol (excedrin migraine and fioricet)  Discontinue all use of tylenol  Continue mucomyst for now    6/1/22  Bili and LFTs trending down since removing all tylenol containing meds  Patient remains stable. Sumatriptan given for migraine  Cleared for discharge home with GI/hepatology follow up. Avoid all tylenol containing medications       Goals of Care Treatment Preferences:  Code Status: Full Code      Consults:   Consults (From admission, onward)        Status Ordering Provider     Inpatient consult to Hematology/Oncology  Once        Provider:  Jl Miles MD    Completed ZAINA MARIE     Inpatient consult to Gastroenterology  Once        Provider:  Shayna Vela MD    Completed ZAINA MARIE     Inpatient consult to Infectious Diseases  Once        Provider:  Cuauhtemoc Odonnell MD    Completed ZAINA MARIE.          No new Assessment & Plan notes have been filed under this hospital service since the last note was generated.  Service: Hospital Medicine    Final Active Diagnoses:    Diagnosis Date Noted POA    PRINCIPAL PROBLEM:  Pancytopenia [D61.818] 05/28/2022 Yes    Neutropenia [D70.9] 05/28/2022 Yes    Abnormal liver function [R94.5] 05/28/2022 Yes      Problems Resolved During this Admission:       Discharged Condition: good    Disposition: Home or Self Care    Follow Up:   Follow-up Information      Fantasma Rowe MD Follow up.    Specialties: Gastroenterology, Hospitalist  Why: please follow up if you have symptoms, As needed  Contact information:  [address]  [address]  [place] [address]  [phone]             Claudia Fountain MD Follow up on 6/13/2022.    Specialties: Obstetrics, Obstetrics and Gynecology  Why: 4:00 pm -  Contact information:  [address]  [address]  [place] [address]  [phone]             Jl Miles MD Follow up on 6/23/2022.    Specialties: Hematology and Oncology, Hematology  Why: 3:00 pm  Contact information:  [address]  [address]  [place] [address]  [phone]             Linda Kerr MD Follow up on 6/15/2022.    Specialty: Internal Medicine  Why: 1:00 pm  Contact information:  [address]  [address]  [place] [address]  [phone]                       Patient Instructions:      Comprehensive metabolic panel   Standing Status: Future Standing Exp. Date: 07/31/23     Protime-INR   Standing Status: Future Standing Exp. Date: 07/31/23     APTT   Standing Status: Future Standing Exp. Date: 07/31/23     Ambulatory referral/consult to Hepatology   Standing Status: Future   Referral Priority: Routine Referral Type: Consultation   Referral Reason: Specialty Services Required   Requested Specialty: Hepatology   Number of Visits Requested: 1     Diet Adult Regular     Notify your health care provider if you experience any of the following:  temperature >100.4     Notify your health care provider if you experience any of the following:  persistent nausea and vomiting or diarrhea     Notify your health care provider if you experience any of the following:  severe uncontrolled pain     Notify your health care provider if you experience any of the following:  difficulty breathing or increased cough     Notify your health care provider if you experience any of the following:  persistent dizziness, light-headedness, or visual disturbances      Notify your health care provider if you experience any of the following:  increased confusion or weakness     Activity as tolerated       Significant Diagnostic Studies: Labs: All labs within the past 24 hours have been reviewed    Pending Diagnostic Studies:     None         Medications:  Reconciled Home Medications:      Medication List      START taking these medications    SUMAtriptan 20 mg/actuation nasal spray  Commonly known as: IMITREX  1 spray (20 mg total) by Nasal route every 2 (two) hours as needed for Migraine. Do not exceed 2 sprays in a 24hr period.        CONTINUE taking these medications    citalopram 40 MG tablet  Commonly known as: CeleXA  Take 1 tablet (40 mg total) by mouth once daily.     ONE-A-DAY WOMEN'S ACTIVE 18 mg iron- 400 mcg-180 mg Tab  Generic drug: mv,Ca,min-iron-FA-guarana-caff  Take 1 tablet by mouth once daily.        STOP taking these medications    aspirin-acetaminophen-caffeine 250-250-65 mg 250-250-65 mg per tablet  Commonly known as: EXCEDRIN MIGRAINE            Indwelling Lines/Drains at time of discharge:   Lines/Drains/Airways     None                 Time spent on the discharge of patient: 37 minutes         Wing Nunez MD  Department of Hospital Medicine  [facility] Surg

## 2022-06-01 NOTE — PLAN OF CARE
CM met with pt prior to d/c - she agreed to f/u at [facility]   Future Appointments   Date Time Provider Department Center   6/13/2022  4:00 PM Claudia Fountain MD [facility] ASHLEE [facility]   6/15/2022  1:00 PM Linda Kerr MD [facility] IMPRI [place] [facility]   6/23/2022  3:00 PM Jl Miles MD [facility] HEM ONC [place] [facility]     no dme, no hh   pt has transportation to home   Discharge rounds on patient. Discussed followup appointments, blue discharge folder, discharge nurse will go over home medications and reasons for medications and final discharge instructions. All patient/caregiver questions answered. Patient verbalized understanding.       06/01/22 1644   Final Note   Assessment Type Final Discharge Note   Anticipated Discharge Disposition Home   What phone number can be called within the next 1-3 days to see how you are doing after discharge? [phone]   Hospital Resources/Appts/Education Provided Appointments scheduled and added to AVS   Post-Acute Status   Other Status No Post-Acute Service Needs   Discharge Delays None known at this time

## 2022-06-02 ENCOUNTER — TELEPHONE (OUTPATIENT)
Dept: PRIMARY CARE CLINIC | Facility: CLINIC | Age: 52
End: 2022-06-02
Payer: COMMERCIAL

## 2022-06-02 LAB
BACTERIA BLD CULT: NORMAL
BACTERIA BLD CULT: NORMAL

## 2022-06-02 NOTE — TELEPHONE ENCOUNTER
"----- Message from Ashley Gallo sent at 6/2/2022  4:09 PM CDT -----  Good afternoon    Today I called this patient to schedule her Referral from the ED to Hepatology.  Patient asked why she was going to the Hepatologist and I couldn't do anything but read the Diagnosis listed in the Referral, in this case "Acute liver failure without hepatic coma"  Patient is very upset because she told me that no one ever mention to her any liver problems and neither the need of seeing a Liver Specialist. I told the lady that the referral is listed as routine and not to worry ahead of time.  I do not have the medical knowledge to explain the why of this referral to the patient, but I'm hoping you can.  Patient schedule to see Dr Kerr on Geovanna 15 and on June 17 she is going to see the Dr. Mcnamara, so is too long for the patient to get an explanation.  Could you please assist with this  Thank you    [place]"

## 2022-06-09 ENCOUNTER — OFFICE VISIT (OUTPATIENT)
Dept: PRIMARY CARE CLINIC | Facility: CLINIC | Age: 52
End: 2022-06-09
Payer: COMMERCIAL

## 2022-06-09 ENCOUNTER — LAB VISIT (OUTPATIENT)
Dept: LAB | Facility: HOSPITAL | Age: 52
End: 2022-06-09
Attending: INTERNAL MEDICINE
Payer: COMMERCIAL

## 2022-06-09 VITALS
HEART RATE: 80 BPM | OXYGEN SATURATION: 97 % | SYSTOLIC BLOOD PRESSURE: 130 MMHG | BODY MASS INDEX: 29.96 KG/M2 | WEIGHT: 214.81 LBS | DIASTOLIC BLOOD PRESSURE: 91 MMHG

## 2022-06-09 DIAGNOSIS — B34.9 VIRAL SYNDROME: ICD-10-CM

## 2022-06-09 DIAGNOSIS — R74.8 ELEVATED LIVER ENZYMES: ICD-10-CM

## 2022-06-09 DIAGNOSIS — D69.6 THROMBOCYTOPENIA: ICD-10-CM

## 2022-06-09 DIAGNOSIS — D70.9 NEUTROPENIA, UNSPECIFIED TYPE: ICD-10-CM

## 2022-06-09 DIAGNOSIS — R50.9 FEVER, UNSPECIFIED FEVER CAUSE: ICD-10-CM

## 2022-06-09 DIAGNOSIS — R74.8 ELEVATED LIVER ENZYMES: Primary | ICD-10-CM

## 2022-06-09 PROBLEM — D61.818 PANCYTOPENIA: Status: RESOLVED | Noted: 2022-05-28 | Resolved: 2022-06-09

## 2022-06-09 PROBLEM — R94.5 ABNORMAL LIVER FUNCTION: Status: RESOLVED | Noted: 2022-05-28 | Resolved: 2022-06-09

## 2022-06-09 PROBLEM — Z12.11 ENCOUNTER FOR SCREENING COLONOSCOPY: Status: RESOLVED | Noted: 2018-11-02 | Resolved: 2022-06-09

## 2022-06-09 PROBLEM — A41.9 SEPSIS: Status: RESOLVED | Noted: 2022-05-28 | Resolved: 2022-06-09

## 2022-06-09 LAB
ALBUMIN SERPL BCP-MCNC: 4.3 G/DL (ref 3.5–5.2)
ALP SERPL-CCNC: 197 U/L (ref 55–135)
ALT SERPL W/O P-5'-P-CCNC: 123 U/L (ref 10–44)
ANION GAP SERPL CALC-SCNC: 10 MMOL/L (ref 8–16)
AST SERPL-CCNC: 39 U/L (ref 10–40)
BASOPHILS # BLD AUTO: 0.08 K/UL (ref 0–0.2)
BASOPHILS NFR BLD: 1.2 % (ref 0–1.9)
BILIRUB SERPL-MCNC: 0.7 MG/DL (ref 0.1–1)
BUN SERPL-MCNC: 16 MG/DL (ref 6–20)
CALCIUM SERPL-MCNC: 10.8 MG/DL (ref 8.7–10.5)
CHLORIDE SERPL-SCNC: 100 MMOL/L (ref 95–110)
CO2 SERPL-SCNC: 29 MMOL/L (ref 23–29)
CREAT SERPL-MCNC: 0.8 MG/DL (ref 0.5–1.4)
DIFFERENTIAL METHOD: ABNORMAL
EOSINOPHIL # BLD AUTO: 0.1 K/UL (ref 0–0.5)
EOSINOPHIL NFR BLD: 1.7 % (ref 0–8)
ERYTHROCYTE [DISTWIDTH] IN BLOOD BY AUTOMATED COUNT: 11.9 % (ref 11.5–14.5)
EST. GFR  (AFRICAN AMERICAN): >60 ML/MIN/1.73 M^2
EST. GFR  (NON AFRICAN AMERICAN): >60 ML/MIN/1.73 M^2
GLUCOSE SERPL-MCNC: 72 MG/DL (ref 70–110)
HCT VFR BLD AUTO: 41.1 % (ref 37–48.5)
HGB BLD-MCNC: 13.7 G/DL (ref 12–16)
IMM GRANULOCYTES # BLD AUTO: 0.02 K/UL (ref 0–0.04)
IMM GRANULOCYTES NFR BLD AUTO: 0.3 % (ref 0–0.5)
LYMPHOCYTES # BLD AUTO: 2.2 K/UL (ref 1–4.8)
LYMPHOCYTES NFR BLD: 34.5 % (ref 18–48)
MCH RBC QN AUTO: 32.4 PG (ref 27–31)
MCHC RBC AUTO-ENTMCNC: 33.3 G/DL (ref 32–36)
MCV RBC AUTO: 97 FL (ref 82–98)
MONOCYTES # BLD AUTO: 0.6 K/UL (ref 0.3–1)
MONOCYTES NFR BLD: 9.8 % (ref 4–15)
NEUTROPHILS # BLD AUTO: 3.4 K/UL (ref 1.8–7.7)
NEUTROPHILS NFR BLD: 52.5 % (ref 38–73)
NRBC BLD-RTO: 0 /100 WBC
PLATELET # BLD AUTO: 671 K/UL (ref 150–450)
PMV BLD AUTO: 8.8 FL (ref 9.2–12.9)
POTASSIUM SERPL-SCNC: 4.4 MMOL/L (ref 3.5–5.1)
PROT SERPL-MCNC: 8.4 G/DL (ref 6–8.4)
RBC # BLD AUTO: 4.23 M/UL (ref 4–5.4)
SODIUM SERPL-SCNC: 139 MMOL/L (ref 136–145)
TSH SERPL DL<=0.005 MIU/L-ACNC: 1.88 UIU/ML (ref 0.4–4)
WBC # BLD AUTO: 6.43 K/UL (ref 3.9–12.7)

## 2022-06-09 PROCEDURE — 85025 COMPLETE CBC W/AUTO DIFF WBC: CPT | Performed by: INTERNAL MEDICINE

## 2022-06-09 PROCEDURE — 99999 PR PBB SHADOW E&M-EST. PATIENT-LVL III: ICD-10-PCS | Mod: PBBFAC,,, | Performed by: INTERNAL MEDICINE

## 2022-06-09 PROCEDURE — 3008F PR BODY MASS INDEX (BMI) DOCUMENTED: ICD-10-PCS | Mod: CPTII,S$GLB,, | Performed by: INTERNAL MEDICINE

## 2022-06-09 PROCEDURE — 1160F PR REVIEW ALL MEDS BY PRESCRIBER/CLIN PHARMACIST DOCUMENTED: ICD-10-PCS | Mod: CPTII,S$GLB,, | Performed by: INTERNAL MEDICINE

## 2022-06-09 PROCEDURE — 3080F DIAST BP >= 90 MM HG: CPT | Mod: CPTII,S$GLB,, | Performed by: INTERNAL MEDICINE

## 2022-06-09 PROCEDURE — 3008F BODY MASS INDEX DOCD: CPT | Mod: CPTII,S$GLB,, | Performed by: INTERNAL MEDICINE

## 2022-06-09 PROCEDURE — 3075F SYST BP GE 130 - 139MM HG: CPT | Mod: CPTII,S$GLB,, | Performed by: INTERNAL MEDICINE

## 2022-06-09 PROCEDURE — 1111F PR DISCHARGE MEDS RECONCILED W/ CURRENT OUTPATIENT MED LIST: ICD-10-PCS | Mod: CPTII,S$GLB,, | Performed by: INTERNAL MEDICINE

## 2022-06-09 PROCEDURE — 84443 ASSAY THYROID STIM HORMONE: CPT | Performed by: INTERNAL MEDICINE

## 2022-06-09 PROCEDURE — 99215 PR OFFICE/OUTPT VISIT, EST, LEVL V, 40-54 MIN: ICD-10-PCS | Mod: S$GLB,,, | Performed by: INTERNAL MEDICINE

## 2022-06-09 PROCEDURE — 99215 OFFICE O/P EST HI 40 MIN: CPT | Mod: S$GLB,,, | Performed by: INTERNAL MEDICINE

## 2022-06-09 PROCEDURE — 1159F MED LIST DOCD IN RCRD: CPT | Mod: CPTII,S$GLB,, | Performed by: INTERNAL MEDICINE

## 2022-06-09 PROCEDURE — 36415 COLL VENOUS BLD VENIPUNCTURE: CPT | Performed by: INTERNAL MEDICINE

## 2022-06-09 PROCEDURE — 80053 COMPREHEN METABOLIC PANEL: CPT | Performed by: INTERNAL MEDICINE

## 2022-06-09 PROCEDURE — 99999 PR PBB SHADOW E&M-EST. PATIENT-LVL III: CPT | Mod: PBBFAC,,, | Performed by: INTERNAL MEDICINE

## 2022-06-09 PROCEDURE — 3075F PR MOST RECENT SYSTOLIC BLOOD PRESS GE 130-139MM HG: ICD-10-PCS | Mod: CPTII,S$GLB,, | Performed by: INTERNAL MEDICINE

## 2022-06-09 PROCEDURE — 1111F DSCHRG MED/CURRENT MED MERGE: CPT | Mod: CPTII,S$GLB,, | Performed by: INTERNAL MEDICINE

## 2022-06-09 PROCEDURE — 1159F PR MEDICATION LIST DOCUMENTED IN MEDICAL RECORD: ICD-10-PCS | Mod: CPTII,S$GLB,, | Performed by: INTERNAL MEDICINE

## 2022-06-09 PROCEDURE — 1160F RVW MEDS BY RX/DR IN RCRD: CPT | Mod: CPTII,S$GLB,, | Performed by: INTERNAL MEDICINE

## 2022-06-09 PROCEDURE — 3080F PR MOST RECENT DIASTOLIC BLOOD PRESSURE >= 90 MM HG: ICD-10-PCS | Mod: CPTII,S$GLB,, | Performed by: INTERNAL MEDICINE

## 2022-06-09 NOTE — PROGRESS NOTES
[facility]   New Visit Progress Note   Recent Hospital Discharge     PRESENTING HISTORY     Chief Complaint/Reason for Admission:  Follow up Hospital Discharge   PCP: Katelin Guerra MD    History of Present Illness:  Ms. Harpreet Peralta is a 52 y.o. female who was recently admitted to the hospital.    [facility]  Discharge Summary        Patient Name: Harpreet Peralta  MRN: [id number]  Patient Class: IP- Inpatient  Admission Date: 5/28/2022  Hospital Length of Stay: 4 days  Discharge Date and Time: 6/1/2022  3:29 PM  Attending Physician: No att. providers found   Discharging Provider: Wing Nunez MD  Primary Care Provider: Katelin Guerra MD  ___________________________________________________________________    Today:  Presents to [facility] for initial hospital follow up.  Recently hospitalized for evaluation of persistent fever (Tmax 102) and ABD pain.  Endorsed viral syndrome for several days prior to admission.  Found to have elevated liver enzymes ALk phos 199 , , , T bili 2.2, .  Labs also significant for neutropenia (WBC 1.67), and thrombocytopenia (platelets 139,000).   ABD US with mild splenomegaly, no other acute findings.   Mucomyst initiated due to concern for inadvertent Tylenol overuse.   Serum acetaminophen level not collected.   Patient placed in Neutropenic precautions.   Seen in consultation with GI, ID, and Hematology teams.   Infectious work up unrevealing including HIV, Acute Hepatitis panel, Influenza, and COVID.  Blood Cx without growth.   Patient improved with above interventions and supportive care.   Discharged to home.     Unaccompanied today.  Ambulatory and independent with ADL's.  No fever since hospital discharge.  All viral syndrome symptoms have resolved.  Tolerating oral intake, no NV or ABD pain.    Review of Systems  General ROS: negative for chills, fever or weight loss  Psychological ROS: negative for hallucination,  depression or suicidal ideation  Ophthalmic ROS: negative for blurry vision, photophobia or eye pain  ENT ROS: negative for epistaxis, sore throat or rhinorrhea  Respiratory ROS: no cough, shortness of breath, or wheezing  Cardiovascular ROS: no chest pain or dyspnea on exertion  Gastrointestinal ROS: no abdominal pain, change in bowel habits, or black/ bloody stools  Genito-Urinary ROS: no dysuria, trouble voiding, or hematuria  Musculoskeletal ROS: negative for gait disturbance or muscular weakness  Neurological ROS: no syncope or seizures; no ataxia  Dermatological ROS: negative for pruritis, rash and jaundice          PAST HISTORY:     Past Medical History:   Diagnosis Date    Anxiety     Depression     Kidney stones, age 16, 38 3/21/2018    Mixed hyperlipidemia 7/18/2019    Primary osteoarthritis of both knees 3/23/2021    Urinary tract infection 8/30/2013    Staphylococcus saprophyticus       Past Surgical History:   Procedure Laterality Date    COLONOSCOPY N/A 11/2/2018    Procedure: COLONOSCOPY;  Surgeon: Young Pink MD;  Location: [address]);  Service: Endoscopy;  Laterality: N/A;       Family History   Problem Relation Age of Onset    Hypertension Mother     Hypertension Father     Breast cancer Maternal Uncle 60    Breast cancer Paternal Aunt 70    Colon cancer Neg Hx     Ovarian cancer Neg Hx          MEDICATIONS & ALLERGIES:     Current Outpatient Medications on File Prior to Visit   Medication Sig Dispense Refill    citalopram (CELEXA) 40 MG tablet Take 1 tablet (40 mg total) by mouth once daily. 90 tablet 3    mv,Ca,min-iron-FA-guarana-caff (ONE-A-DAY WOMEN'S ACTIVE) 18 mg iron- 400 mcg-180 mg Tab Take 1 tablet by mouth once daily.      SUMAtriptan (IMITREX) 20 mg/actuation nasal spray 1 spray (20 mg total) by Nasal route every 2 (two) hours as needed for Migraine. Do not exceed 2 sprays in a 24hr period. 6 each 1     No current facility-administered medications on file  prior to visit.        Review of patient's allergies indicates:  No Known Allergies    OBJECTIVE:     Vital Signs:  BP (!) 130/91 (BP Location: Left arm, Patient Position: Sitting, BP Method: Large (Automatic))   Pulse 80   Wt 97.4 kg (214 lb 13.4 oz)   SpO2 97%   BMI 29.96 kg/m²   Wt Readings from Last 3 Encounters:   05/31/22 2329 96.1 kg (211 lb 13.8 oz)   05/29/22 2312 93.2 kg (205 lb 7.5 oz)   05/29/22 0930 97.2 kg (214 lb 4.6 oz)   05/29/22 0900 97.2 kg (214 lb 4.6 oz)   05/28/22 2319 97.2 kg (214 lb 4.6 oz)   05/28/22 0639 95.3 kg (210 lb)   01/26/22 0900 97.8 kg (215 lb 9.8 oz)   08/13/21 0925 96.8 kg (213 lb 6.5 oz)     Body mass index is 29.96 kg/m².        Physical Exam:  BP (!) 130/91 (BP Location: Left arm, Patient Position: Sitting, BP Method: Large (Automatic))   Pulse 80   Wt 97.4 kg (214 lb 13.4 oz)   SpO2 97%   BMI 29.96 kg/m²   General appearance: alert, cooperative, no distress  Constitutional:Oriented to person, place, and time  + appears well-developed and well-nourished.   HEENT: Normocephalic, atraumatic, neck symmetrical, no nasal discharge   Eyes: conjunctivae/corneas clear, PERRL, EOM's intact  Lungs: clear to auscultation bilaterally, no dullness to percussion bilaterally  Heart: regular rate and rhythm without rub; no displacement of the PMI   Abdomen: soft, non-tender; bowel sounds normoactive; no organomegaly  Extremities: extremities symmetric; no clubbing, cyanosis, or edema  Integument: Skin color, texture, turgor normal; no rashes; hair distrubution normal  Neurologic: Alert and oriented X 3, normal strength, normal coordination and gait  Psychiatric: no pressured speech; normal affect; no evidence of impaired cognition     Laboratory  Lab Results   Component Value Date    WBC 5.66 06/01/2022    HGB 11.2 (L) 06/01/2022    HCT 33.3 (L) 06/01/2022    MCV 96 06/01/2022     06/01/2022     BMP  Lab Results   Component Value Date     06/01/2022    K 4.2 06/01/2022      06/01/2022    CO2 21 (L) 06/01/2022    BUN 10 06/01/2022    CREATININE 0.6 06/01/2022    CALCIUM 9.0 06/01/2022    ANIONGAP 14 06/01/2022    ESTGFRAFRICA >60 06/01/2022    EGFRNONAA >60 06/01/2022     Lab Results   Component Value Date     (H) 06/01/2022     (H) 06/01/2022     (H) 05/28/2022    ALKPHOS 326 (H) 06/01/2022    BILITOT 0.7 06/01/2022     Lab Results   Component Value Date    INR 1.0 05/29/2022     Lab Results   Component Value Date    HGBA1C 5.0 06/21/2021       Diagnostic Results:    ABD US 5/28/22:  Mild splenomegaly      ASSESSMENT & PLAN:       Elevated liver enzymes  - thought 2/2 to viral syndrome  - repeat labs today  - see Hepatology 6/17/22  -     Comprehensive Metabolic Panel; Future; Expected date: 06/09/2022  -     TSH; Future; Expected date: 06/09/2022    Neutropenia, unspecified type  Thrombocytopenia  - resolved by hospital discharge  - infectious work up unrevealing  - see Hematology 6/16/22  -     CBC Auto Differential; Future; Expected date: 06/10/2022      Viral syndrome  Fever, unspecified fever cause  - etiology unclear- infectious work up unrevealing  - all symptoms have resolved  - afebrile since hospitalization     Patient will be released [facility].  Her PCP recently retired.  She will see Dr Orly Gottlieb 8/24/22 to establish new primary care.     Instructions for the patient:      Scheduled Follow-up :  Future Appointments   Date Time Provider Department Center   6/9/2022 12:00 PM APPOINTMENT LAB, [facility]   6/16/2022  9:00 AM Jl Miles MD [facility] HEM ONC [facility]   6/17/2022  8:00 AM Susu Mcnamara MD [facility] HEPAT [facility]   8/24/2022  1:00 PM Orly Gottlieb MD [facility] IM [facility]       Post Visit Medication List:     Medication List          Accurate as of June 9, 2022 11:47 AM. If you have any questions, ask your nurse or doctor.            CONTINUE taking these medications    citalopram 40 MG  tablet  Commonly known as: CeleXA  Take 1 tablet (40 mg total) by mouth once daily.     ONE-A-DAY WOMEN'S ACTIVE 18 mg iron- 400 mcg-180 mg Tab  Generic drug: mv,Ca,min-iron-FA-guarana-caff     SUMAtriptan 20 mg/actuation nasal spray  Commonly known as: IMITREX  1 spray (20 mg total) by Nasal route every 2 (two) hours as needed for Migraine. Do not exceed 2 sprays in a 24hr period.            Signing Physician:  Linda Kerr MD

## 2022-07-24 NOTE — PROGRESS NOTES
"   [facility] Clinic Follow-up Note    THIS IS A VIDEO VISIT, THEREFORE, SOME ELEMENTS OF THE PHYSICAL EXAM, SUCH AS VITAL SIGNS, HEART SOUNDS OR BREATH SOUNDS ARE NOT INCLUDED.  ANY SYMPTOMS OR SIGNS THAT WERE VISUALIZED OR STATED BY THE PATIENT MAY BE INCLUDED IN THIS NOTE..    Reason for Visit:  Diagnoses of Elevated liver enzymes and Acute liver failure without hepatic coma were pertinent to this visit.    PCP: Katelin Guerra (Inactive)   [address] / [place] [id number]    HPI:  This is a 52 y.o. female here for evaluation of: post-discharge visit after admission for acute liver failure from tylenol toxicity, having frequent intake of tylenol.    Interval history: 7/26/22:  Missed an appointment for lab draw.  Feeling fine, appetite is good, no complaints.    AST, ALT were in the 300s upon admission, increased to 448 and 580, resp. And have declined to AST 39, .  T bili peak 2.0, declined to 0.7.    Will get CMP, and if normal, will have patient return PRN. If abnormal, will continue to monitor till enzymes become normal.       I have explained to patient to be cautious about taking acetaminophen containing products, even in small amounts, as the past episode of hepatic failure occurred while she was taking "normal" amount.Also cautioned her about the wide inclusion of acetaminophen in various OTC products, and to read labels before using them.          Discharge summary from last admission:    Admission Date: 5/28/2022  Hospital Length of Stay: 4 days  Discharge Date and Time: 6/1/2022  3:29 PM    HPI:   This is a pleasant 52 year old WF with a history of depression in remission on Celexa who presented to the ED with CC of a one week  history of acute onset nausea with associated daily fevers, frontal headache earlier in the week that has since resolved, & up to 102F not relieved with home alternating Tylenol and Ibuprofen every 3 hours. She explains awakening Monday with " "symptoms as above. She visited Urgent care who told her she likely had a "stomach virus" and to alternate Tylenol and Ibuprofen for fevers. She chatted with her PCP's colleague (PCP retired) who advised her to do the same. She visited with a virtual nurse via phone who also confirmed treatment as above. By this am, when symptoms were no better and fever was not breaking with alternating meds as below, she presented to the ED for further evaluation "something is not right". She denies abdominal pain, chest pain, cough, dyspnea, trauma, diarrhea, prolonged headaches, myalgias, neck pain, changes to bladder habits, previous episodes. She denies out of the country travel or sick contacts. 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 No abnormal bruising or bleeding. Denies new medications. She reports elevated liver enzymes years ago with her PCP who told her to stop her weekend drinking. She reports abnormal liver imaging at that time "my doctor said it was fat". Review of records does not show any of reported "years" prior liver function serum or imaging. Hepatic panel was normal one year ago. No pertinent FH. She was found in the ED to have pancytopenia with WBC of 1.6, platelets of 139,000 on CBC, ALk phos 199 (baseline 70's)  (baseline 26)  (baseline 33), normal procalcitonin, normal lactic acid, UA with proteinuria/occult blood/&RBCs. Abdominal US with mild splenomegaly (14 cms) with normal appearing liver and biliary system. ANC calculation ~ 1000. Blood cultures were collected. ID consulted for neutropenia. Hepatitis panel requested. She was admitted to the medical surgical unit for evaluation of abnormal liver function with moderate neutropenia + pancytopenia. Awaiting ID and GI evaluation.       * No surgery found *      Hospital Course:   Presented with acute onset persistent fevers up to 102F and nauseas without abdominal pain  Found to " have abnormal liver function and pancytopenia with moderate neutropenia  She denies any new medications, known sick contacts, or recent illness  Abdominal US showed normal liver and ducts and mild splenomegaly  HIV, Hepatitis Panel, INR requested  GI and Hematology requested  GI briefly reviewed records and considering ATc Tylenol usage recommended Mucomyst initiation until full consult to follow  Unfortunately, Mucomyst perceived as started, not officially started until this am    5/29/2022  Hepatitis A/B/C negative   HIV pending  GI consulted who no longer thinks this is tylenol related but recommends continued mucomyst for now; hopeful for viral etiology but concern for myelodysplastic conditions exist  She is no longer neutropenic today; ANC 2000  Plan for discontinuation of neutropenic precaution if she remains with stable ANC tomorrow  ID evaluated the patient and believes her presenting symptoms are consistent with viral process; considering her resolution of neutropenia had no more to add to case  Hematology consult requested and pending evaluation    5/30/2022  Still with some nausea  HIV negative  Hep A/B/C non reactive  She is no longer neutropenic  Leukopenia persists but improving  ID, GI, hematology believes abnormal liver function could be viral in nature  Hepatic function trending downward but still pretty elevated  Blood cultures NGTD  GI recommends continued hepatic trend for now  OK to stop Mucomyst tomorrow, continue today--> per GI  She has a sever migraine with photophobia currently, prefers to take her Excedrin today but willing to try migraine cocktail (Toradol/compazine/Benadryl IV) if Excedrin not successful with aborting migraine     5/31/22  Bili trending down, however noted to have worsening ALT likely 2/2 continued ingestion of tylenol (excedrin migraine and fioricet)  Discontinue all use of tylenol  Continue mucomyst for now    6/1/22  Bili and LFTs trending down since removing all  tylenol containing meds  Patient remains stable. Sumatriptan given for migraine  Cleared for discharge home with GI/hepatology follow up. Avoid all tylenol containing medications       Final Active Diagnoses:    Diagnosis Date Noted POA    PRINCIPAL PROBLEM:  Pancytopenia [D61.818] 05/28/2022 Yes    Neutropenia [D70.9] 05/28/2022 Yes    Abnormal liver function [R94.5] 05/28/2022 Yes         Elevated liver enzymes: Yes  Abnormal imaging: No  Cirrhosis: No  Hepatitis C: No  Hepatitis B: No  Fatty liver: No  Encephalopathy: No  Post-hospital discharge: Yes  Symptoms: none    Primary hepatic manifestations:  Fatigue:No  Edema:No  Ascites:No  Encephalopathy:No  Abdominal pain:No  GI bleeds: No  Pruritus:No  Weight Changes:No  Changes in Bowel habits: No  Muscle cramps:No    Risk factors for liver disease:  No jaundice  No transfusions  No IVDU  Did not snort cocaine or similar agents  Did not live with anyone with hepatitis B or C  Sexual partner not tested  No hepatotoxic medications  No exposure to industrial toxins  Alcohol: no      ROS:  Constitutional: No fevers, chills, weight changes, fatigue  ENT: No allergies, nosebleeds,   CV: No chest pain  Pulm: No cough, shortness of breath  Ophtho: No vision changes  GI/Liver: see HPI  Derm: No rash, itching  Heme: No swollen glands, bruising  MSK: No joint pains, joint swelling  : No dysuria, hematuria, decrease in urine output  Endo: No hot or cold intolerance  Neuro: No confusion, disorientation, difficulty with sleep, memory, concentration, syncope, seizure  Psych: No anxiety, depression    Medical History:  has a past medical history of Anxiety, Depression, Kidney stones, age 16, 38 (3/21/2018), Mixed hyperlipidemia (7/18/2019), Primary osteoarthritis of both knees (3/23/2021), and Urinary tract infection (8/30/2013).    Surgical History:  has a past surgical history that includes Colonoscopy (N/A, 11/2/2018).    Family History: family history includes Breast  cancer (age of onset: 60) in her maternal uncle; Breast cancer (age of onset: 70) in her paternal aunt; Hypertension in her father and mother..     Social History:  reports that she has never smoked. She has never used smokeless tobacco. She reports current alcohol use of about 2.0 standard drinks of alcohol per week. She reports that she does not use drugs.    Review of patient's allergies indicates:  No Known Allergies    Current Outpatient Rx   Medication Sig Dispense Refill    citalopram (CELEXA) 40 MG tablet Take 1 tablet (40 mg total) by mouth once daily. 90 tablet 3    mv,Ca,min-iron-FA-guarana-caff (ONE-A-DAY WOMEN'S ACTIVE) 18 mg iron- 400 mcg-180 mg Tab Take 1 tablet by mouth once daily.      SUMAtriptan (IMITREX) 20 mg/actuation nasal spray 1 spray (20 mg total) by Nasal route every 2 (two) hours as needed for Migraine. Do not exceed 2 sprays in a 24hr period. (Patient not taking: Reported on 6/9/2022) 6 each 1       Objective Findings:    Vital Signs:  There were no vitals taken for this visit.  There is no height or weight on file to calculate BMI.    Physical Exam:  General Appearance: Well appearing in no acute distress  Eyes:    No scleral icterus, EOMI  Neurologic: Alert, oriented x 3.       Labs:  Lab Results   Component Value Date    WBC 6.43 06/09/2022    HGB 13.7 06/09/2022    HCT 41.1 06/09/2022     (H) 06/09/2022    CHOL 237 (H) 06/21/2021    TRIG 244 (H) 06/21/2021    HDL 51 06/21/2021    INR 1.0 05/29/2022    CREATININE 0.8 06/09/2022    BUN 16 06/09/2022    BILITOT 0.7 06/09/2022     (H) 06/09/2022    AST 39 06/09/2022    ALKPHOS 197 (H) 06/09/2022     06/09/2022    K 4.4 06/09/2022     06/09/2022    CO2 29 06/09/2022    TSH 1.881 06/09/2022    HGBA1C 5.0 06/21/2021       Imaging:       Endoscopy:      Assessment:  1. Elevated liver enzymes    2. Acute liver failure without hepatic coma    Most likely acetaminophen induced hepatitis.     Missed an appointment  "for lab draw.  Feeling fine, appetite is good, no complaints.    AST, ALT were in the 300s upon admission, increased to 448 and 580, resp. And have declined to AST 39, .  T bili peak 2.0, declined to 0.7.    Will get CMP, and if normal, will have patient return PRN. If abnormal, will continue to monitor till enzymes become normal.       I have explained to patient to be cautious about taking acetaminophen containing products, even in small amounts, as the past episode of hepatic failure occurred while she was taking "normal" amount.Also cautioned her about the wide inclusion of acetaminophen in various OTC products, and to read labels before using them.           Recommendations:  -  Labs this week: CMP  -  Avoid Tylenol   -  Return PRN      No follow-ups on file.      Order summary:  Orders Placed This Encounter   Procedures    Comprehensive Metabolic Panel       Thank you so much for allowing me to participate in the care of Harpreet Mcnamara MD    "

## 2022-07-26 ENCOUNTER — OFFICE VISIT (OUTPATIENT)
Dept: HEPATOLOGY | Facility: CLINIC | Age: 52
End: 2022-07-26
Payer: COMMERCIAL

## 2022-07-26 DIAGNOSIS — K72.00 ACUTE LIVER FAILURE WITHOUT HEPATIC COMA: ICD-10-CM

## 2022-07-26 DIAGNOSIS — R74.8 ELEVATED LIVER ENZYMES: ICD-10-CM

## 2022-07-26 PROCEDURE — 99203 PR OFFICE/OUTPT VISIT, NEW, LEVL III, 30-44 MIN: ICD-10-PCS | Mod: 95,,, | Performed by: INTERNAL MEDICINE

## 2022-07-26 PROCEDURE — 99203 OFFICE O/P NEW LOW 30 MIN: CPT | Mod: 95,,, | Performed by: INTERNAL MEDICINE

## 2022-07-28 ENCOUNTER — TELEPHONE (OUTPATIENT)
Dept: HEPATOLOGY | Facility: CLINIC | Age: 52
End: 2022-07-28
Payer: COMMERCIAL

## 2022-07-28 NOTE — TELEPHONE ENCOUNTER
----- Message from Susu Mcnamara MD sent at 7/26/2022  9:25 AM CDT -----  Recommendations:  -  Labs this week: CMP  -  Avoid Tylenol   -  Return PRN

## 2022-07-29 ENCOUNTER — LAB VISIT (OUTPATIENT)
Dept: LAB | Facility: HOSPITAL | Age: 52
End: 2022-07-29
Attending: INTERNAL MEDICINE
Payer: COMMERCIAL

## 2022-07-29 ENCOUNTER — PATIENT MESSAGE (OUTPATIENT)
Dept: HEPATOLOGY | Facility: CLINIC | Age: 52
End: 2022-07-29
Payer: COMMERCIAL

## 2022-07-29 DIAGNOSIS — K72.00 ACUTE LIVER FAILURE WITHOUT HEPATIC COMA: ICD-10-CM

## 2022-07-29 DIAGNOSIS — R74.8 ELEVATED LIVER ENZYMES: ICD-10-CM

## 2022-07-29 LAB
ALBUMIN SERPL BCP-MCNC: 3.9 G/DL (ref 3.5–5.2)
ALP SERPL-CCNC: 99 U/L (ref 55–135)
ALT SERPL W/O P-5'-P-CCNC: 86 U/L (ref 10–44)
ANION GAP SERPL CALC-SCNC: 10 MMOL/L (ref 8–16)
AST SERPL-CCNC: 46 U/L (ref 10–40)
BILIRUB SERPL-MCNC: 0.6 MG/DL (ref 0.1–1)
BUN SERPL-MCNC: 11 MG/DL (ref 6–20)
CALCIUM SERPL-MCNC: 9.8 MG/DL (ref 8.7–10.5)
CHLORIDE SERPL-SCNC: 102 MMOL/L (ref 95–110)
CO2 SERPL-SCNC: 27 MMOL/L (ref 23–29)
CREAT SERPL-MCNC: 0.7 MG/DL (ref 0.5–1.4)
EST. GFR  (AFRICAN AMERICAN): >60 ML/MIN/1.73 M^2
EST. GFR  (NON AFRICAN AMERICAN): >60 ML/MIN/1.73 M^2
GLUCOSE SERPL-MCNC: 92 MG/DL (ref 70–110)
POTASSIUM SERPL-SCNC: 4.1 MMOL/L (ref 3.5–5.1)
PROT SERPL-MCNC: 7.4 G/DL (ref 6–8.4)
SODIUM SERPL-SCNC: 139 MMOL/L (ref 136–145)

## 2022-07-29 PROCEDURE — 80053 COMPREHEN METABOLIC PANEL: CPT | Performed by: INTERNAL MEDICINE

## 2022-07-29 PROCEDURE — 36415 COLL VENOUS BLD VENIPUNCTURE: CPT | Performed by: INTERNAL MEDICINE

## 2022-08-03 ENCOUNTER — PATIENT MESSAGE (OUTPATIENT)
Dept: HEPATOLOGY | Facility: CLINIC | Age: 52
End: 2022-08-03
Payer: COMMERCIAL

## 2022-09-27 ENCOUNTER — PATIENT MESSAGE (OUTPATIENT)
Dept: PRIMARY CARE CLINIC | Facility: CLINIC | Age: 52
End: 2022-09-27
Payer: COMMERCIAL

## 2022-10-04 ENCOUNTER — OFFICE VISIT (OUTPATIENT)
Dept: FAMILY MEDICINE | Facility: CLINIC | Age: 52
End: 2022-10-04
Payer: COMMERCIAL

## 2022-10-04 VITALS
OXYGEN SATURATION: 97 % | HEIGHT: 71 IN | HEART RATE: 75 BPM | DIASTOLIC BLOOD PRESSURE: 92 MMHG | WEIGHT: 215.38 LBS | BODY MASS INDEX: 30.15 KG/M2 | SYSTOLIC BLOOD PRESSURE: 118 MMHG

## 2022-10-04 DIAGNOSIS — Z23 ENCOUNTER FOR IMMUNIZATION: ICD-10-CM

## 2022-10-04 DIAGNOSIS — E66.09 CLASS 1 OBESITY DUE TO EXCESS CALORIES WITHOUT SERIOUS COMORBIDITY WITH BODY MASS INDEX (BMI) OF 30.0 TO 30.9 IN ADULT: ICD-10-CM

## 2022-10-04 DIAGNOSIS — Z76.89 ENCOUNTER TO ESTABLISH CARE WITH NEW DOCTOR: Primary | ICD-10-CM

## 2022-10-04 DIAGNOSIS — F41.1 GAD (GENERALIZED ANXIETY DISORDER): ICD-10-CM

## 2022-10-04 DIAGNOSIS — E78.2 MIXED HYPERLIPIDEMIA: ICD-10-CM

## 2022-10-04 PROCEDURE — 1159F MED LIST DOCD IN RCRD: CPT | Mod: CPTII,S$GLB,, | Performed by: FAMILY MEDICINE

## 2022-10-04 PROCEDURE — 3080F DIAST BP >= 90 MM HG: CPT | Mod: CPTII,S$GLB,, | Performed by: FAMILY MEDICINE

## 2022-10-04 PROCEDURE — 99214 OFFICE O/P EST MOD 30 MIN: CPT | Mod: 25,S$GLB,, | Performed by: FAMILY MEDICINE

## 2022-10-04 PROCEDURE — 3008F BODY MASS INDEX DOCD: CPT | Mod: CPTII,S$GLB,, | Performed by: FAMILY MEDICINE

## 2022-10-04 PROCEDURE — 1159F PR MEDICATION LIST DOCUMENTED IN MEDICAL RECORD: ICD-10-PCS | Mod: CPTII,S$GLB,, | Performed by: FAMILY MEDICINE

## 2022-10-04 PROCEDURE — 99214 PR OFFICE/OUTPT VISIT, EST, LEVL IV, 30-39 MIN: ICD-10-PCS | Mod: 25,S$GLB,, | Performed by: FAMILY MEDICINE

## 2022-10-04 PROCEDURE — 3008F PR BODY MASS INDEX (BMI) DOCUMENTED: ICD-10-PCS | Mod: CPTII,S$GLB,, | Performed by: FAMILY MEDICINE

## 2022-10-04 PROCEDURE — 90471 IMMUNIZATION ADMIN: CPT | Mod: S$GLB,,, | Performed by: FAMILY MEDICINE

## 2022-10-04 PROCEDURE — 3074F SYST BP LT 130 MM HG: CPT | Mod: CPTII,S$GLB,, | Performed by: FAMILY MEDICINE

## 2022-10-04 PROCEDURE — 99999 PR PBB SHADOW E&M-EST. PATIENT-LVL III: CPT | Mod: PBBFAC,,, | Performed by: FAMILY MEDICINE

## 2022-10-04 PROCEDURE — 3074F PR MOST RECENT SYSTOLIC BLOOD PRESSURE < 130 MM HG: ICD-10-PCS | Mod: CPTII,S$GLB,, | Performed by: FAMILY MEDICINE

## 2022-10-04 PROCEDURE — 90715 TDAP VACCINE 7 YRS/> IM: CPT | Mod: S$GLB,,, | Performed by: FAMILY MEDICINE

## 2022-10-04 PROCEDURE — 90471 TDAP VACCINE GREATER THAN OR EQUAL TO 7YO IM: ICD-10-PCS | Mod: S$GLB,,, | Performed by: FAMILY MEDICINE

## 2022-10-04 PROCEDURE — 3080F PR MOST RECENT DIASTOLIC BLOOD PRESSURE >= 90 MM HG: ICD-10-PCS | Mod: CPTII,S$GLB,, | Performed by: FAMILY MEDICINE

## 2022-10-04 PROCEDURE — 99999 PR PBB SHADOW E&M-EST. PATIENT-LVL III: ICD-10-PCS | Mod: PBBFAC,,, | Performed by: FAMILY MEDICINE

## 2022-10-04 PROCEDURE — 90715 TDAP VACCINE GREATER THAN OR EQUAL TO 7YO IM: ICD-10-PCS | Mod: S$GLB,,, | Performed by: FAMILY MEDICINE

## 2022-10-04 RX ORDER — CITALOPRAM 40 MG/1
40 TABLET, FILM COATED ORAL DAILY
Qty: 90 TABLET | Refills: 3 | Status: SHIPPED | OUTPATIENT
Start: 2022-10-04 | End: 2023-12-11

## 2022-10-04 NOTE — PROGRESS NOTES
(Portions of this note were dictated using voice recognition software and may contain dictation related errors in spelling/grammar/syntax not found on text review)    CC:   Chief Complaint   Patient presents with    Establish Care    Medication Refill       HPI: 52 y.o. female presented to [facility] as a new patient and medication refills, previous PCP is retired.  She has medical history significant for mixed hyperlipidemia, anxiety, depression.  Patient takes Celexa 40 mg for anxiety and depression, reports symptoms are controlled and she needs refills.  She was never on any medication for hyperlipidemia, she was trying to do lifestyle modification with exercise and cholesterol was a little bit better, however, currently she is not doing any exercise. She is due for blood workup.  She missed mammogram earlier this year due to having COVID infection, plans to reschedule it.  She is up-to-date with Pap smear and colonoscopy.  She does not smoke, has toxic habits.  She denies having any symptoms today.    Past Medical History:   Diagnosis Date    Anxiety     Depression     Kidney stones, age 16, 38 3/21/2018    Mixed hyperlipidemia 7/18/2019    Primary osteoarthritis of both knees 3/23/2021    Urinary tract infection 8/30/2013    Staphylococcus saprophyticus       Past Surgical History:   Procedure Laterality Date    COLONOSCOPY N/A 11/2/2018    Procedure: COLONOSCOPY;  Surgeon: Young Pink MD;  Location: [address]);  Service: Endoscopy;  Laterality: N/A;       Family History   Problem Relation Age of Onset    Hypertension Mother     Hypertension Father     Breast cancer Maternal Uncle 60    Breast cancer Paternal Aunt 70    Colon cancer Neg Hx     Ovarian cancer Neg Hx        Social History     Tobacco Use    Smoking status: Never    Smokeless tobacco: Never   Substance Use Topics    Alcohol use: Yes     Alcohol/week: 2.0 standard drinks     Types: 2 Glasses of wine per week     Comment: socially     Drug use: No       Lab Results   Component Value Date    WBC 6.43 06/09/2022    HGB 13.7 06/09/2022    HCT 41.1 06/09/2022    MCV 97 06/09/2022     (H) 06/09/2022    CHOL 237 (H) 06/21/2021    TRIG 244 (H) 06/21/2021    HDL 51 06/21/2021    ALT 86 (H) 07/29/2022    AST 46 (H) 07/29/2022    BILITOT 0.6 07/29/2022    ALKPHOS 99 07/29/2022     07/29/2022    K 4.1 07/29/2022     07/29/2022    CREATININE 0.7 07/29/2022    ESTGFRAFRICA >60 07/29/2022    EGFRNONAA >60 07/29/2022    CALCIUM 9.8 07/29/2022    ALBUMIN 3.9 07/29/2022    BUN 11 07/29/2022    CO2 27 07/29/2022    TSH 1.881 06/09/2022    INR 1.0 05/29/2022    HGBA1C 5.0 06/21/2021    LDLCALC 137.2 06/21/2021    GLU 92 07/29/2022             Vital signs reviewed  PE:   APPEARANCE: Well nourished, well developed, in no acute distress.    HEAD: Normocephalic, atraumatic.  EYES: EOMI.  Conjunctivae noninjected.  NECK: Supple with no cervical lymphadenopathy.    CHEST: Good inspiratory effort. Lungs clear to auscultation with no wheezes or crackles.  CARDIOVASCULAR: Normal S1, S2. No rubs, murmurs, or gallops.  ABDOMEN: Bowel sounds normal. Not distended. Soft. No tenderness or masses. No organomegaly.  EXTREMITIES: No edema, cyanosis, or clubbing.    Review of Systems   Constitutional:  Negative for chills, fatigue and fever.   HENT: Negative.     Respiratory:  Negative for cough, shortness of breath and wheezing.    Cardiovascular:  Negative for chest pain, palpitations and leg swelling.   Gastrointestinal: Negative.    Genitourinary: Negative.    Neurological: Negative.    Psychiatric/Behavioral: Negative.     All other systems reviewed and are negative.    IMPRESSION  1. Encounter to establish care with new doctor    2. Encounter for immunization    3. Class 1 obesity due to excess calories without serious comorbidity with body mass index (BMI) of 30.0 to 30.9 in adult    4. SANDHYA (generalized anxiety disorder)    5. Mixed hyperlipidemia           PLAN      1. Encounter to establish care with new doctor  - CBC Auto Differential; Future  - Comprehensive Metabolic Panel; Future  - TSH; Future  - Lipid Panel; Future      2. Encounter for immunization  - (In Office Administered) Tdap Vaccine      3. Class 1 obesity due to excess calories without serious comorbidity with body mass index (BMI) of 30.0 to 30.9 in adult  BMI 30  Counseling provided on healthy lifestyle, encouraged to do moderate intensity regular exercise 30 minutes every day 5 days a week, to include vegetables and fruits and cut back on saturated fats and carbohydrates.       4. SANDHYA (generalized anxiety disorder)  - citalopram (CELEXA) 40 MG tablet; Take 1 tablet (40 mg total) by mouth once daily.  Dispense: 90 tablet; Refill: 3       5. Mixed hyperlipidemia  Cholesterol 237,   Encouraged to do moderate intensity regular exercise 30 minutes every day 5 days a week, to include vegetables and fruits and cut back on saturated fats and carbohydrates.         SCREENINGS      Immunizations:   UTD with Covid vaccine  Denies flu vaccine  Tdap today    Age/demographic appropriate health maintenance:    Health Maintenance Due   Topic Date Due    Pneumococcal Vaccines (Age 0-64) (1 - PCV) Never done    TETANUS VACCINE  Never done    Shingles Vaccine (1 of 2) Never done    COVID-19 Vaccine (3 - Booster for Herbie series) 03/26/2022    Mammogram  06/06/2022    Influenza Vaccine (1) 09/01/2022           Marlon Awan   10/4/2022

## 2022-10-10 ENCOUNTER — PATIENT MESSAGE (OUTPATIENT)
Dept: ADMINISTRATIVE | Facility: HOSPITAL | Age: 52
End: 2022-10-10
Payer: COMMERCIAL

## 2022-10-18 ENCOUNTER — LAB VISIT (OUTPATIENT)
Dept: LAB | Facility: HOSPITAL | Age: 52
End: 2022-10-18
Attending: FAMILY MEDICINE
Payer: COMMERCIAL

## 2022-10-18 DIAGNOSIS — Z76.89 ENCOUNTER TO ESTABLISH CARE WITH NEW DOCTOR: ICD-10-CM

## 2022-10-18 LAB
ALBUMIN SERPL BCP-MCNC: 3.7 G/DL (ref 3.5–5.2)
ALP SERPL-CCNC: 76 U/L (ref 55–135)
ALT SERPL W/O P-5'-P-CCNC: 33 U/L (ref 10–44)
ANION GAP SERPL CALC-SCNC: 7 MMOL/L (ref 8–16)
AST SERPL-CCNC: 25 U/L (ref 10–40)
BASOPHILS # BLD AUTO: 0.04 K/UL (ref 0–0.2)
BASOPHILS NFR BLD: 0.8 % (ref 0–1.9)
BILIRUB SERPL-MCNC: 0.7 MG/DL (ref 0.1–1)
BUN SERPL-MCNC: 16 MG/DL (ref 6–20)
CALCIUM SERPL-MCNC: 9.3 MG/DL (ref 8.7–10.5)
CHLORIDE SERPL-SCNC: 106 MMOL/L (ref 95–110)
CHOLEST SERPL-MCNC: 224 MG/DL (ref 120–199)
CHOLEST/HDLC SERPL: 4.7 {RATIO} (ref 2–5)
CO2 SERPL-SCNC: 24 MMOL/L (ref 23–29)
CREAT SERPL-MCNC: 0.8 MG/DL (ref 0.5–1.4)
DIFFERENTIAL METHOD: ABNORMAL
EOSINOPHIL # BLD AUTO: 0.1 K/UL (ref 0–0.5)
EOSINOPHIL NFR BLD: 2 % (ref 0–8)
ERYTHROCYTE [DISTWIDTH] IN BLOOD BY AUTOMATED COUNT: 11.4 % (ref 11.5–14.5)
EST. GFR  (NO RACE VARIABLE): >60 ML/MIN/1.73 M^2
GLUCOSE SERPL-MCNC: 100 MG/DL (ref 70–110)
HCT VFR BLD AUTO: 36.8 % (ref 37–48.5)
HDLC SERPL-MCNC: 48 MG/DL (ref 40–75)
HDLC SERPL: 21.4 % (ref 20–50)
HGB BLD-MCNC: 12.4 G/DL (ref 12–16)
IMM GRANULOCYTES # BLD AUTO: 0.01 K/UL (ref 0–0.04)
IMM GRANULOCYTES NFR BLD AUTO: 0.2 % (ref 0–0.5)
LDLC SERPL CALC-MCNC: 136 MG/DL (ref 63–159)
LYMPHOCYTES # BLD AUTO: 1.7 K/UL (ref 1–4.8)
LYMPHOCYTES NFR BLD: 32.3 % (ref 18–48)
MCH RBC QN AUTO: 32.6 PG (ref 27–31)
MCHC RBC AUTO-ENTMCNC: 33.7 G/DL (ref 32–36)
MCV RBC AUTO: 97 FL (ref 82–98)
MONOCYTES # BLD AUTO: 0.4 K/UL (ref 0.3–1)
MONOCYTES NFR BLD: 7.6 % (ref 4–15)
NEUTROPHILS # BLD AUTO: 2.9 K/UL (ref 1.8–7.7)
NEUTROPHILS NFR BLD: 57.1 % (ref 38–73)
NONHDLC SERPL-MCNC: 176 MG/DL
NRBC BLD-RTO: 0 /100 WBC
PLATELET # BLD AUTO: 370 K/UL (ref 150–450)
PMV BLD AUTO: 9.2 FL (ref 9.2–12.9)
POTASSIUM SERPL-SCNC: 4.1 MMOL/L (ref 3.5–5.1)
PROT SERPL-MCNC: 7.3 G/DL (ref 6–8.4)
RBC # BLD AUTO: 3.8 M/UL (ref 4–5.4)
SODIUM SERPL-SCNC: 137 MMOL/L (ref 136–145)
TRIGL SERPL-MCNC: 200 MG/DL (ref 30–150)
TSH SERPL DL<=0.005 MIU/L-ACNC: 2.63 UIU/ML (ref 0.4–4)
WBC # BLD AUTO: 5.11 K/UL (ref 3.9–12.7)

## 2022-10-18 PROCEDURE — 80053 COMPREHEN METABOLIC PANEL: CPT | Performed by: FAMILY MEDICINE

## 2022-10-18 PROCEDURE — 36415 COLL VENOUS BLD VENIPUNCTURE: CPT | Performed by: FAMILY MEDICINE

## 2022-10-18 PROCEDURE — 80061 LIPID PANEL: CPT | Performed by: FAMILY MEDICINE

## 2022-10-18 PROCEDURE — 85025 COMPLETE CBC W/AUTO DIFF WBC: CPT | Performed by: FAMILY MEDICINE

## 2022-10-18 PROCEDURE — 84443 ASSAY THYROID STIM HORMONE: CPT | Performed by: FAMILY MEDICINE

## 2022-10-20 ENCOUNTER — PATIENT MESSAGE (OUTPATIENT)
Dept: FAMILY MEDICINE | Facility: CLINIC | Age: 52
End: 2022-10-20
Payer: COMMERCIAL

## 2023-01-18 ENCOUNTER — HOSPITAL ENCOUNTER (OUTPATIENT)
Dept: RADIOLOGY | Facility: HOSPITAL | Age: 53
Discharge: HOME | End: 2023-01-18
Attending: OBSTETRICS & GYNECOLOGY
Payer: COMMERCIAL

## 2023-01-18 DIAGNOSIS — Z12.31 VISIT FOR SCREENING MAMMOGRAM: ICD-10-CM

## 2023-01-18 PROCEDURE — 77067 MAMMO DIGITAL SCREENING BILAT WITH TOMO: ICD-10-PCS | Mod: 26,,, | Performed by: RADIOLOGY

## 2023-01-18 PROCEDURE — 77067 SCR MAMMO BI INCL CAD: CPT | Mod: 26,,, | Performed by: RADIOLOGY

## 2023-01-18 PROCEDURE — 77063 BREAST TOMOSYNTHESIS BI: CPT | Mod: 26,,, | Performed by: RADIOLOGY

## 2023-01-18 PROCEDURE — 77067 SCR MAMMO BI INCL CAD: CPT | Mod: TC

## 2023-01-18 PROCEDURE — 77063 MAMMO DIGITAL SCREENING BILAT WITH TOMO: ICD-10-PCS | Mod: 26,,, | Performed by: RADIOLOGY

## 2023-06-19 ENCOUNTER — OFFICE VISIT (OUTPATIENT)
Dept: FAMILY MEDICINE | Facility: CLINIC | Age: 53
End: 2023-06-19
Payer: COMMERCIAL

## 2023-06-19 VITALS
WEIGHT: 212.75 LBS | HEART RATE: 77 BPM | SYSTOLIC BLOOD PRESSURE: 118 MMHG | OXYGEN SATURATION: 96 % | HEIGHT: 71 IN | BODY MASS INDEX: 29.78 KG/M2 | DIASTOLIC BLOOD PRESSURE: 62 MMHG

## 2023-06-19 DIAGNOSIS — E78.2 MIXED HYPERLIPIDEMIA: ICD-10-CM

## 2023-06-19 DIAGNOSIS — Z12.11 ENCOUNTER FOR SCREENING COLONOSCOPY: ICD-10-CM

## 2023-06-19 DIAGNOSIS — F41.1 GAD (GENERALIZED ANXIETY DISORDER): ICD-10-CM

## 2023-06-19 DIAGNOSIS — Z09 FOLLOW-UP EXAM: Primary | ICD-10-CM

## 2023-06-19 PROBLEM — D70.9 NEUTROPENIA: Status: RESOLVED | Noted: 2022-05-28 | Resolved: 2023-06-19

## 2023-06-19 PROBLEM — D69.6 THROMBOCYTOPENIA: Status: RESOLVED | Noted: 2022-06-09 | Resolved: 2023-06-19

## 2023-06-19 PROCEDURE — 3008F PR BODY MASS INDEX (BMI) DOCUMENTED: ICD-10-PCS | Mod: CPTII,S$GLB,, | Performed by: FAMILY MEDICINE

## 2023-06-19 PROCEDURE — 3074F PR MOST RECENT SYSTOLIC BLOOD PRESSURE < 130 MM HG: ICD-10-PCS | Mod: CPTII,S$GLB,, | Performed by: FAMILY MEDICINE

## 2023-06-19 PROCEDURE — 3074F SYST BP LT 130 MM HG: CPT | Mod: CPTII,S$GLB,, | Performed by: FAMILY MEDICINE

## 2023-06-19 PROCEDURE — 3078F DIAST BP <80 MM HG: CPT | Mod: CPTII,S$GLB,, | Performed by: FAMILY MEDICINE

## 2023-06-19 PROCEDURE — 99214 OFFICE O/P EST MOD 30 MIN: CPT | Mod: S$GLB,,, | Performed by: FAMILY MEDICINE

## 2023-06-19 PROCEDURE — 1159F MED LIST DOCD IN RCRD: CPT | Mod: CPTII,S$GLB,, | Performed by: FAMILY MEDICINE

## 2023-06-19 PROCEDURE — 99999 PR PBB SHADOW E&M-EST. PATIENT-LVL III: CPT | Mod: PBBFAC,,, | Performed by: FAMILY MEDICINE

## 2023-06-19 PROCEDURE — 1159F PR MEDICATION LIST DOCUMENTED IN MEDICAL RECORD: ICD-10-PCS | Mod: CPTII,S$GLB,, | Performed by: FAMILY MEDICINE

## 2023-06-19 PROCEDURE — 99214 PR OFFICE/OUTPT VISIT, EST, LEVL IV, 30-39 MIN: ICD-10-PCS | Mod: S$GLB,,, | Performed by: FAMILY MEDICINE

## 2023-06-19 PROCEDURE — 3008F BODY MASS INDEX DOCD: CPT | Mod: CPTII,S$GLB,, | Performed by: FAMILY MEDICINE

## 2023-06-19 PROCEDURE — 3078F PR MOST RECENT DIASTOLIC BLOOD PRESSURE < 80 MM HG: ICD-10-PCS | Mod: CPTII,S$GLB,, | Performed by: FAMILY MEDICINE

## 2023-06-19 PROCEDURE — 99999 PR PBB SHADOW E&M-EST. PATIENT-LVL III: ICD-10-PCS | Mod: PBBFAC,,, | Performed by: FAMILY MEDICINE

## 2023-06-19 NOTE — PROGRESS NOTES
(Portions of this note were dictated using voice recognition software and may contain dictation related errors in spelling/grammar/syntax not found on text review)    CC:   Chief Complaint   Patient presents with    [facility]       HPI: 53 y.o. female presented for follow up, last seen by me 10/22 as new pt to [facility]. She has medical history significant for mixed hyperlipidemia, anxiety, depression.  Patient takes Celexa 40 mg for anxiety and depression, reports symptoms are controlled.  She tries to do regular exercise and diet changes for hyperlipidemia management. She is up-to-date with Pap smear, will be due for colonoscopy in 11/2023, last colonoscopy was 11/2018 which needed to be repeated in 5 years.  She does not smoke, has toxic habits.  She denies having any symptoms today.    Past Medical History:   Diagnosis Date    Anxiety     Depression     Kidney stones, age 16, 38 3/21/2018    Mixed hyperlipidemia 7/18/2019    Primary osteoarthritis of both knees 3/23/2021    Urinary tract infection 8/30/2013    Staphylococcus saprophyticus       Past Surgical History:   Procedure Laterality Date    COLONOSCOPY N/A 11/2/2018    Procedure: COLONOSCOPY;  Surgeon: Young Pink MD;  Location: [facility] ([address] [place] [address]);  Service: Endoscopy;  Laterality: N/A;       Family History   Problem Relation Age of Onset    Hypertension Mother     Hypertension Father     Breast cancer Maternal Uncle 60    Breast cancer Paternal Aunt 70    Colon cancer Neg Hx     Ovarian cancer Neg Hx        Social History     Tobacco Use    Smoking status: Never    Smokeless tobacco: Never   Substance Use Topics    Alcohol use: Yes     Alcohol/week: 2.0 standard drinks     Types: 2 Glasses of wine per week     Comment: socially    Drug use: No       Lab Results   Component Value Date    WBC 5.11 10/18/2022    HGB 12.4 10/18/2022    HCT 36.8 (L) 10/18/2022    MCV 97 10/18/2022     10/18/2022    CHOL 224 (H) 10/18/2022    TRIG 200  (H) 10/18/2022    HDL 48 10/18/2022    ALT 33 10/18/2022    AST 25 10/18/2022    BILITOT 0.7 10/18/2022    ALKPHOS 76 10/18/2022     10/18/2022    K 4.1 10/18/2022     10/18/2022    CREATININE 0.8 10/18/2022    ESTGFRAFRICA >60 07/29/2022    EGFRNONAA >60 07/29/2022    CALCIUM 9.3 10/18/2022    ALBUMIN 3.7 10/18/2022    BUN 16 10/18/2022    CO2 24 10/18/2022    TSH 2.631 10/18/2022    INR 1.0 05/29/2022    HGBA1C 5.0 06/21/2021    LDLCALC 136.0 10/18/2022     10/18/2022             Vital signs reviewed  PE:   APPEARANCE: Well nourished, well developed, in no acute distress.    HEAD: Normocephalic, atraumatic.  EYES: EOMI.  Conjunctivae noninjected.  NECK: Supple with no cervical lymphadenopathy.    CHEST: Good inspiratory effort. Lungs clear to auscultation with no wheezes or crackles.  CARDIOVASCULAR: Normal S1, S2. No rubs, murmurs, or gallops.  ABDOMEN: Bowel sounds normal. Not distended. Soft. No tenderness or masses. No organomegaly.  EXTREMITIES: No edema, cyanosis, or clubbing.    Review of Systems   Constitutional:  Negative for chills, fatigue, fever and unexpected weight change.   HENT: Negative.     Respiratory:  Negative for cough, shortness of breath and wheezing.    Cardiovascular:  Negative for chest pain, palpitations and leg swelling.   Gastrointestinal: Negative.    Genitourinary: Negative.    Neurological: Negative.    Psychiatric/Behavioral: Negative.     All other systems reviewed and are negative.    IMPRESSION  1. Follow-up exam    2. Encounter for screening colonoscopy    3. Mixed hyperlipidemia    4. SANDHYA (generalized anxiety disorder)    5. BMI 29.0-29.9,adult            PLAN      1. Encounter for screening colonoscopy    - Case Request Endoscopy: COLONOSCOPY      2. Follow-up exam    - CBC Auto Differential; Future  - Comprehensive Metabolic Panel; Future  - TSH; Future  - Lipid Panel; Future  - Hemoglobin A1C; Future      3. Mixed hyperlipidemia    Lifestyle changes with  diet and exercise recommended        4. SANDHYA (generalized anxiety disorder)    Stable    Continue celexa      5. BMI 29.0-29.9,adult    Counseling provided on healthy lifestyle, encouraged to do moderate intensity regular exercise 30 minutes every day 5 days a week, to include vegetables and fruits and cut back on saturated fats and carbohydrates.          SCREENINGS      Immunizations:   Denies shingles and pneumonia vaccines  UTD with Covid vaccine        Age/demographic appropriate health maintenance:    UTD with mammogram and pap smear  Colonoscopy to be done in 11/23      Health Maintenance Due   Topic Date Due    COVID-19 Vaccine (3 - Booster for Herbie series) 03/26/2022           F/U 6 months        Marlon Awan   6/19/2023

## 2023-08-29 ENCOUNTER — TELEPHONE (OUTPATIENT)
Dept: GASTROENTEROLOGY | Facility: CLINIC | Age: 53
End: 2023-08-29
Payer: COMMERCIAL

## 2023-08-29 NOTE — TELEPHONE ENCOUNTER
Contacted patient to schedule a colonoscopy. Patient is not due until 11/2023. Patient added to call back list.

## 2023-09-20 ENCOUNTER — TELEPHONE (OUTPATIENT)
Dept: GASTROENTEROLOGY | Facility: CLINIC | Age: 53
End: 2023-09-20
Payer: COMMERCIAL

## 2023-09-20 RX ORDER — SODIUM, POTASSIUM,MAG SULFATES 17.5-3.13G
1 SOLUTION, RECONSTITUTED, ORAL ORAL DAILY
Qty: 1 KIT | Refills: 0 | Status: SHIPPED | OUTPATIENT
Start: 2023-09-20 | End: 2023-09-22

## 2023-09-20 NOTE — TELEPHONE ENCOUNTER
Endoscopy Scheduling Questionnaire:         Are you taking any blood thinners? no               If Yes  Have you been on them for longer than one year? N/a    2. Have you been diagnosed with Diverticulitis in past three months?  no    3. Are you on dialysis or have Kidney Disease? no    4. Previous Colonoscopy?  yes         If yes Do you have a history of colon polyps?  yes    5. Family History of Colon Cancer: no         Relation: n/a       Age at Diagnosis: n/a      6. Are you a diabetic?  no    7. Do you have a history of constipation?  no    8. Are you taking a GLP-1 Agonist/Adipex (weight loss drugs)? no  Dulaglutide (Trulicity) (weekly)  Exenatide extended release (Bydureon bcise) (weekly)  Exenatide (Byetta) (twice daily)  Semaglutide (Ozempic) (weekly)  Liraglutide (Victoza, Saxenda) (daily)  Lixisenatide (Adlyxin) (daily)  Semaglutide (Rybelsus) (taken by mouth once daily)    Hold GLP-1 agonists on the day of the procedure/surgery for patients who take the medication daily.    Hold GLP-1 agonists a week prior to the procedure/surgery for patients who take the medication weekly.    Procedure scheduled with Dr. Ribera  on  11/2/2023    The prep being used is Suprep     The patient's prep instructions were sent via patient portal.        SUPREP Instructions    [facility] [address]  Clinic Office [phone]  Endoscopy Lab [phone]    You are scheduled for a Colonoscopy with Dr. Ribera  on 11/2/2023 at [facility] in [place].    Check in at the Hospital -1st floor, Information desk.   Call [phone] to reschedule.    An adult friend/family member must come with you to drive you home.  You cannot drive, take a taxi, Uber/Lyft or bus to leave the Endoscopy Center alone.  If you do not have someone to drive you home, your test will be cancelled.     Please follow the directions of your doctor if you take any pills that thin your blood. If you take these meds: Aggrenox,  Brilinta, Effient, Eliquis, Lovenox, Plavix, Pletal, Pradaxa, Ticilid, Xarelto or Coumadin, let the doctor's office know.    Please hold any GLP-1 medications prior to the procedure: Dulaglutide Trulicity(hold week prior), Exenatide Byetta (hold the morning of procedure), Semaglutide Ozempic (hold week prior), Liraglutide Victoza, Saxenda(hold week prior), Lixisenatide Adlyxin (hold the morning of procedure), Semaglutide Rybelsus (hold the morning of procedure), Tirzepatide Mounjaro (hold week prior)     DON'T: On the morning of the test do not take insulin or pills for diabetes.     DO: On the morning of the test, do take any pills for blood pressure, heart, anti-rejection and or seizures with a small sip of water. Bring any inhalers with you.    To have a good prep, you must follow these instructions - please do not use the directions from the pharmacy.    The doctor will send a prescription for the SUPREP.      The Day Before the test:    You can only drink CLEAR LIQUIDS the whole day before your test.  You can't eat any food for the whole day.    You CAN have:  Water, Coffee or decaf coffee (no milk or cream)  Tea  Soft drinks - regular and sugar free  Jello (green or yellow)  Apple Juice, white grape juice, white cranberry juice  Gatorade, Power Aid, Crystal Light, Denilson Aid  Lemonade and Limeade  Bouillon, clear soup  Snowball, popsicles  YOU CAN'T DRINK ANYTHING RED, PURPLE ORANGE OR BLUE   YOU CAN'T DRINK ALCOHOL  ONLY DRINK WHAT IS ON THE LIST      At 5 pm the night before your test:    Pour the 1st bottle of SUPREP into the cup provided in the box. Add water to the line on the cup and mix well.  Drink the whole cup and then drink 2 more full cups of water over 1 hour.  This can be easier to drink if it is cold. You can mix it 20 minutes ahead of time and place in the refrigerator before you drink it.  You must drink it within 30-45 minutes of mixing it.  Do NOT pour the drink over ice.  You  can drink it with a straw.    The Day of the test - We will call you 2 days before your test to tell you what time to get there.    5 hours before you come to the hospital (this may be in the middle of the night)  Pour the 2nd bottle of SUPREP into the cup provided in the box. Add water to the line on the cup and mix well.  Drink the whole cup and then drink 2 more full cups of water over 1 hour.  It might be easier to drink if it is cold. You can mix it 20 minutes ahead of time and place in the refrigerator before you drink it.  You must drink it within 30-45 minutes of mixing it.  Do NOT pour the drink over ice.  You can drink it with a straw.    YOU CAN'T EAT OR DRINK ANYTHING ELSE ONCE YOU FINISH THE PREP    Leave all valuables and jewelry at home. You will be at the hospital for 2-4 hours.    Call the Endoscopy department at [phone] with any questions about your procedure.

## 2023-10-31 ENCOUNTER — TELEPHONE (OUTPATIENT)
Dept: ENDOSCOPY | Facility: HOSPITAL | Age: 53
End: 2023-10-31
Payer: COMMERCIAL

## 2023-10-31 NOTE — TELEPHONE ENCOUNTER
Spoke with patient about arrival time @ 0815.   Colon/Suprep    Prep instructions reviewed: the day before the procedure, follow a clear liquid diet all day, then start the first 1/2 of prep at 5pm and take 2nd 1/2 of prep @ 0300.  Pt must be completely NPO when prep completed @ 0500.              Medications: Do not take Insulin or oral diabetic medications the day of the procedure.  Take as prescribed: heart, seizure and blood pressure medication in the morning with a sip of water (less than an ounce).  Take any breathing medications and bring inhalers to hospital with you Leave all valuables and jewelry at home.     Wear comfortable clothes to procedure to change into hospital gown You cannot drive for 24 hours after your procedure because you will receive sedation for your procedure to make you comfortable.  A ride must be provided at discharge.

## 2023-11-02 ENCOUNTER — HOSPITAL ENCOUNTER (OUTPATIENT)
Facility: HOSPITAL | Age: 53
Discharge: HOME | End: 2023-11-02
Attending: INTERNAL MEDICINE | Admitting: INTERNAL MEDICINE
Payer: COMMERCIAL

## 2023-11-02 ENCOUNTER — ANESTHESIA EVENT (OUTPATIENT)
Dept: ENDOSCOPY | Facility: HOSPITAL | Age: 53
End: 2023-11-02
Payer: COMMERCIAL

## 2023-11-02 ENCOUNTER — ANESTHESIA (OUTPATIENT)
Dept: ENDOSCOPY | Facility: HOSPITAL | Age: 53
End: 2023-11-02
Payer: COMMERCIAL

## 2023-11-02 VITALS
SYSTOLIC BLOOD PRESSURE: 130 MMHG | HEART RATE: 57 BPM | TEMPERATURE: 98 F | HEIGHT: 71 IN | BODY MASS INDEX: 28 KG/M2 | DIASTOLIC BLOOD PRESSURE: 79 MMHG | OXYGEN SATURATION: 100 % | WEIGHT: 200 LBS | RESPIRATION RATE: 18 BRPM

## 2023-11-02 DIAGNOSIS — Z12.11 COLON CANCER SCREENING: ICD-10-CM

## 2023-11-02 PROCEDURE — 63600175 PHARM REV CODE 636 W HCPCS: Performed by: NURSE ANESTHETIST, CERTIFIED REGISTERED

## 2023-11-02 PROCEDURE — 45385 COLONOSCOPY W/LESION REMOVAL: CPT | Mod: PT | Performed by: INTERNAL MEDICINE

## 2023-11-02 PROCEDURE — 45380 PR COLONOSCOPY,BIOPSY: ICD-10-PCS | Mod: 33,59,, | Performed by: INTERNAL MEDICINE

## 2023-11-02 PROCEDURE — 45385 PR COLONOSCOPY,REMV LESN,SNARE: ICD-10-PCS | Mod: 33,,, | Performed by: INTERNAL MEDICINE

## 2023-11-02 PROCEDURE — 88305 TISSUE EXAM BY PATHOLOGIST: CPT | Mod: 59 | Performed by: PATHOLOGY

## 2023-11-02 PROCEDURE — 25000003 PHARM REV CODE 250: Performed by: NURSE ANESTHETIST, CERTIFIED REGISTERED

## 2023-11-02 PROCEDURE — D9220A PRA ANESTHESIA: ICD-10-PCS | Mod: 33,ANES,, | Performed by: ANESTHESIOLOGY

## 2023-11-02 PROCEDURE — 37000008 HC ANESTHESIA 1ST 15 MINUTES: Performed by: INTERNAL MEDICINE

## 2023-11-02 PROCEDURE — D9220A PRA ANESTHESIA: Mod: 33,CRNA,, | Performed by: NURSE ANESTHETIST, CERTIFIED REGISTERED

## 2023-11-02 PROCEDURE — D9220A PRA ANESTHESIA: Mod: 33,ANES,, | Performed by: ANESTHESIOLOGY

## 2023-11-02 PROCEDURE — 45385 COLONOSCOPY W/LESION REMOVAL: CPT | Mod: 33,,, | Performed by: INTERNAL MEDICINE

## 2023-11-02 PROCEDURE — 27201012 HC FORCEPS, HOT/COLD, DISP: Performed by: INTERNAL MEDICINE

## 2023-11-02 PROCEDURE — 88305 TISSUE EXAM BY PATHOLOGIST: ICD-10-PCS | Mod: 26,,, | Performed by: PATHOLOGY

## 2023-11-02 PROCEDURE — 45380 COLONOSCOPY AND BIOPSY: CPT | Mod: PT,59 | Performed by: INTERNAL MEDICINE

## 2023-11-02 PROCEDURE — 88305 TISSUE EXAM BY PATHOLOGIST: CPT | Mod: 26,,, | Performed by: PATHOLOGY

## 2023-11-02 PROCEDURE — D9220A PRA ANESTHESIA: ICD-10-PCS | Mod: 33,CRNA,, | Performed by: NURSE ANESTHETIST, CERTIFIED REGISTERED

## 2023-11-02 PROCEDURE — 37000009 HC ANESTHESIA EA ADD 15 MINS: Performed by: INTERNAL MEDICINE

## 2023-11-02 PROCEDURE — 25000003 PHARM REV CODE 250: Performed by: INTERNAL MEDICINE

## 2023-11-02 PROCEDURE — 45380 COLONOSCOPY AND BIOPSY: CPT | Mod: 33,59,, | Performed by: INTERNAL MEDICINE

## 2023-11-02 PROCEDURE — 27201089 HC SNARE, DISP (ANY): Performed by: INTERNAL MEDICINE

## 2023-11-02 RX ORDER — PROPOFOL 10 MG/ML
VIAL (ML) INTRAVENOUS
Status: DISCONTINUED | OUTPATIENT
Start: 2023-11-02 | End: 2023-11-02

## 2023-11-02 RX ORDER — SODIUM CHLORIDE 0.9 % (FLUSH) 0.9 %
3 SYRINGE (ML) INJECTION
Status: DISCONTINUED | OUTPATIENT
Start: 2023-11-02 | End: 2023-11-02 | Stop reason: HOSPADM

## 2023-11-02 RX ORDER — SODIUM CHLORIDE 9 MG/ML
INJECTION, SOLUTION INTRAVENOUS CONTINUOUS
Status: DISCONTINUED | OUTPATIENT
Start: 2023-11-02 | End: 2023-11-02 | Stop reason: HOSPADM

## 2023-11-02 RX ORDER — LIDOCAINE HYDROCHLORIDE 20 MG/ML
INJECTION INTRAVENOUS
Status: DISCONTINUED | OUTPATIENT
Start: 2023-11-02 | End: 2023-11-02

## 2023-11-02 RX ORDER — PROPOFOL 10 MG/ML
VIAL (ML) INTRAVENOUS CONTINUOUS PRN
Status: DISCONTINUED | OUTPATIENT
Start: 2023-11-02 | End: 2023-11-02

## 2023-11-02 RX ADMIN — PROPOFOL 150 MCG/KG/MIN: 10 INJECTION, EMULSION INTRAVENOUS at 09:11

## 2023-11-02 RX ADMIN — SODIUM CHLORIDE: 9 INJECTION, SOLUTION INTRAVENOUS at 08:11

## 2023-11-02 RX ADMIN — PROPOFOL 80 MG: 10 INJECTION, EMULSION INTRAVENOUS at 09:11

## 2023-11-02 RX ADMIN — LIDOCAINE HYDROCHLORIDE 50 MG: 20 INJECTION, SOLUTION INTRAVENOUS at 09:11

## 2023-11-02 NOTE — ANESTHESIA POSTPROCEDURE EVALUATION
Anesthesia Post Evaluation    Patient: Harpreet Peralta    Procedure(s) Performed: Procedure(s) (LRB):  COLONOSCOPY (N/A)    Final Anesthesia Type: general      Patient location during evaluation: PACU  Patient participation: Yes- Able to Participate  Level of consciousness: awake and alert  Post-procedure vital signs: reviewed and stable  Pain management: adequate  Airway patency: patent    PONV status at discharge: No PONV  Anesthetic complications: no      Cardiovascular status: stable  Respiratory status: spontaneous ventilation  Hydration status: euvolemic  Follow-up not needed.          Vitals Value Taken Time   /79 11/02/23 1052   Temp 36.9 °C (98.4 °F) 11/02/23 1022   Pulse 57 11/02/23 1052   Resp 18 11/02/23 1052   SpO2 100 % 11/02/23 1052         Event Time   Out of Recovery 10:58:39         Pain/Cliff Score: Cliff Score: 10 (11/2/2023 10:52 AM)

## 2023-11-02 NOTE — [employer] PATIENT INSTRUCTIONS
Discharge Summary/Instructions after an Endoscopic Procedure  Patient Name: Harpreet Peralta  Patient MRN: [id number]  Patient YOB: 1970 Thursday, November 2, 2023  Fede Ribera MD  Dear patient,  As a result of recent federal legislation (The Federal Cures Act), you may   receive lab or pathology results from your procedure in your [facility]   account before your physician is able to contact you. Your physician or   their representative will relay the results to you with their   recommendations at their soonest availability.  Thank you,  Your health is very important to us during the Covid Crisis. Following your   procedure today, you will receive a daily text for 2 weeks asking about   signs or symptoms of Covid 19.  Please respond to this text when you   receive it so we can follow up and keep you as safe as possible.   RESTRICTIONS:  During your procedure today, you received medications for sedation.  These   medications may affect your judgment, balance and coordination.  Therefore,   for 24 hours, you have the following restrictions:   - DO NOT drive a car, operate machinery, make legal/financial decisions,   sign important papers or drink alcohol.    ACTIVITY:  Today: no heavy lifting, straining or running due to procedural   sedation/anesthesia.  The following day: return to full activity including work.  DIET:  Eat and drink normally unless instructed otherwise.     TREATMENT FOR COMMON SIDE EFFECTS:  - Mild abdominal pain, nausea, belching, bloating or excessive gas:  rest,   eat lightly and use a heating pad.  - Sore Throat: treat with throat lozenges and/or gargle with warm salt   water.  - Because air was used during the procedure, expelling large amounts of air   from your rectum or belching is normal.  - If a bowel prep was taken, you may not have a bowel movement for 1-3 days.    This is normal.  SYMPTOMS TO WATCH FOR AND REPORT TO YOUR PHYSICIAN:  1. Abdominal pain or bloating, other than  gas cramps.  2. Chest pain.  3. Back pain.  4. Signs of infection such as: chills or fever occurring within 24 hours   after the procedure.  5. Rectal bleeding, which would show as bright red, maroon, or black stools.   (A tablespoon of blood from the rectum is not serious, especially if   hemorrhoids are present.)  6. Vomiting.  7. Weakness or dizziness.  GO DIRECTLY TO THE NEAREST EMERGENCY ROOM IF YOU HAVE ANY OF THE FOLLOWING:      Difficulty breathing              Chills and/or fever over 101 F   Persistent vomiting and/or vomiting blood   Severe abdominal pain   Severe chest pain   Black, tarry stools   Bleeding- more than one tablespoon   Any other symptom or condition that you feel may need urgent attention  Your doctor recommends these additional instructions:  If any biopsies were taken, your doctors clinic will contact you in 1 to 2   weeks with any results.  - Discharge patient to home.   - Patient has a contact number available for emergencies.  The signs and   symptoms of potential delayed complications were discussed with the   patient.  Return to normal activities tomorrow.  Written discharge   instructions were provided to the patient.   - Resume previous diet.   - Continue present medications.   - Await pathology results.   - Repeat colonoscopy in 3 years for surveillance.  For questions, problems or results please call your physician - Fede Ribera MD.  EMERGENCY PHONE NUMBER: [phone],  LAB RESULTS: [phone]  IF A COMPLICATION OR EMERGENCY SITUATION ARISES AND YOU ARE UNABLE TO REACH   YOUR PHYSICIAN - GO DIRECTLY TO THE EMERGENCY ROOM.  Fede Ribera MD  11/2/2023 10:18:02 AM  This report has been verified and signed electronically.  Dear patient,  As a result of recent federal legislation (The Federal Cures Act), you may   receive lab or pathology results from your procedure in your [facility]   account before your physician is able to contact you. Your physician or   their  representative will relay the results to you with their   recommendations at their soonest availability.  Thank you,  [employer]ATION

## 2023-11-02 NOTE — H&P
Short Stay Endoscopy History and Physical    PCP - Marlon Awan MD    Procedure - Colonoscopy  ASA - per anesthesia  Mallampati - per anesthesia  History of Anesthesia problems - no  Family history Anesthesia problems - no   Plan of anesthesia - General    HPI:  This is a 53 y.o. female here for evaluation of : personal history of colon polyps      ROS:  Constitutional: No fevers, chills, No weight loss  CV: No chest pain  Pulm: No cough, No shortness of breath  GI: see HPI  Derm: No rash    Medical History:  has a past medical history of Anxiety, Depression, Kidney stones, age 16, 38 (3/21/2018), Mixed hyperlipidemia (7/18/2019), Primary osteoarthritis of both knees (3/23/2021), and Urinary tract infection (8/30/2013).    Surgical History:  has a past surgical history that includes Colonoscopy (N/A, 11/2/2018).    Family History: family history includes Breast cancer (age of onset: 60) in her maternal uncle; Breast cancer (age of onset: 70) in her paternal aunt; Hypertension in her father and mother.. Otherwise no colon cancer, inflammatory bowel disease, or GI malignancies.    Social History:  reports that she has never smoked. She has never used smokeless tobacco. She reports current alcohol use of about 2.0 standard drinks of alcohol per week. She reports that she does not use drugs.    Review of patient's allergies indicates:   Allergen Reactions    Tylenol [acetaminophen] Anaphylaxis and Swelling       Medications:   Medications Prior to Admission   Medication Sig Dispense Refill Last Dose    citalopram (CELEXA) 40 MG tablet Take 1 tablet (40 mg total) by mouth once daily. 90 tablet 3 Past Week    mv,Ca,min-iron-FA-guarana-caff (ONE-A-DAY WOMEN'S ACTIVE) 18 mg iron- 400 mcg-180 mg Tab Take 1 tablet by mouth once daily.   Past Week         Physical Exam:    Vital Signs: There were no vitals filed for this visit.    General Appearance: Well appearing in no acute distress  Eyes:    No scleral icterus  ENT:  Neck supple, Lips, mucosa, and tongue normal; teeth and gums normal  Abdomen: Soft, non tender, non distended with positive bowel sounds. No hepatosplenomegaly, ascites, or mass.  Extremities: 2+ pulses, no clubbing, cyanosis or edema  Skin: No rash      Labs:  Lab Results   Component Value Date    WBC 5.11 10/18/2022    HGB 12.4 10/18/2022    HCT 36.8 (L) 10/18/2022     10/18/2022    CHOL 224 (H) 10/18/2022    TRIG 200 (H) 10/18/2022    HDL 48 10/18/2022    ALT 33 10/18/2022    AST 25 10/18/2022     10/18/2022    K 4.1 10/18/2022     10/18/2022    CREATININE 0.8 10/18/2022    BUN 16 10/18/2022    CO2 24 10/18/2022    TSH 2.631 10/18/2022    INR 1.0 05/29/2022    HGBA1C 5.0 06/21/2021       I have explained the risks and benefits of endoscopy procedures to the patient including but not limited to bleeding, perforation, infection, and death.  The patient was asked if they understand and allowed to ask any further questions to their satisfaction.    Fede Ribera MD

## 2023-11-02 NOTE — TRANSFER OF CARE
"Anesthesia Transfer of Care Note    Patient: Harpreet Peralta    Procedure(s) Performed: Procedure(s) (LRB):  COLONOSCOPY (N/A)    Patient location: GI    Anesthesia Type: general    Transport from OR: Transported from OR on room air with adequate spontaneous ventilation    Post pain: adequate analgesia    Post assessment: no apparent anesthetic complications    Post vital signs: stable    Level of consciousness: awake    Nausea/Vomiting: no nausea/vomiting    Complications: none    Transfer of care protocol was followed      Last vitals: Visit Vitals  /78 (BP Location: Left arm, Patient Position: Lying)   Pulse 75   Temp 36.8 °C (98.2 °F) (Temporal)   Resp 18   Ht 5' 11" (1.803 m)   Wt 90.7 kg (200 lb)   SpO2 (!) 94%   Breastfeeding No   BMI 27.89 kg/m²     "

## 2023-11-02 NOTE — ANESTHESIA PREPROCEDURE EVALUATION
11/02/2023  Harpreet Peralta is a 53 y.o., female.      Pre-op Assessment    I have reviewed the Patient Summary Reports.     I have reviewed the Nursing Notes. I have reviewed the NPO Status.   I have reviewed the Medications.     Review of Systems  Anesthesia Hx:             Denies Family Hx of Anesthesia complications.    Denies Personal Hx of Anesthesia complications.                        Physical Exam    Airway:  Mallampati: II   Mouth Opening: Normal  Neck ROM: Normal ROM        Anesthesia Plan  Type of Anesthesia, risks & benefits discussed:    Anesthesia Type: Gen Natural Airway  Intra-op Monitoring Plan: Standard ASA Monitors  Post Op Pain Control Plan: multimodal analgesia  Induction:  IV  Informed Consent: Informed consent signed with the Patient and all parties understand the risks and agree with anesthesia plan.  All questions answered.   ASA Score: 1  Day of Surgery Review of History & Physical: H&P Update referred to the surgeon/provider.    Ready For Surgery From Anesthesia Perspective.     .

## 2023-11-06 LAB
FINAL PATHOLOGIC DIAGNOSIS: NORMAL
GROSS: NORMAL
Lab: NORMAL

## 2023-12-11 DIAGNOSIS — F41.1 GAD (GENERALIZED ANXIETY DISORDER): ICD-10-CM

## 2023-12-11 RX ORDER — CITALOPRAM 40 MG/1
40 TABLET, FILM COATED ORAL
Qty: 90 TABLET | Refills: 3 | Status: SHIPPED | OUTPATIENT
Start: 2023-12-11

## 2024-01-24 DIAGNOSIS — Z12.31 OTHER SCREENING MAMMOGRAM: ICD-10-CM

## 2024-02-24 ENCOUNTER — HOSPITAL ENCOUNTER (OUTPATIENT)
Dept: RADIOLOGY | Facility: HOSPITAL | Age: 54
Discharge: HOME | End: 2024-02-24
Attending: FAMILY MEDICINE
Payer: COMMERCIAL

## 2024-02-24 DIAGNOSIS — Z12.31 ENCOUNTER FOR SCREENING MAMMOGRAM FOR BREAST CANCER: ICD-10-CM

## 2024-02-24 PROCEDURE — 77063 BREAST TOMOSYNTHESIS BI: CPT | Mod: 26,,, | Performed by: RADIOLOGY

## 2024-02-24 PROCEDURE — 77067 SCR MAMMO BI INCL CAD: CPT | Mod: 26,,, | Performed by: RADIOLOGY

## 2024-02-24 PROCEDURE — 77067 SCR MAMMO BI INCL CAD: CPT | Mod: TC

## 2024-09-05 NOTE — PLAN OF CARE
Problem: Adult Inpatient Plan of Care  Goal: Plan of Care Review  Outcome: Ongoing, Progressing      Normal mammogram  Repeat in one year  Reymundo Pazi MD

## 2024-12-22 DIAGNOSIS — F41.1 GAD (GENERALIZED ANXIETY DISORDER): ICD-10-CM

## 2024-12-23 RX ORDER — CITALOPRAM 40 MG/1
40 TABLET, FILM COATED ORAL DAILY
Qty: 90 TABLET | Refills: 3 | Status: SHIPPED | OUTPATIENT
Start: 2024-12-23

## 2024-12-26 ENCOUNTER — PATIENT MESSAGE (OUTPATIENT)
Dept: FAMILY MEDICINE | Facility: CLINIC | Age: 54
End: 2024-12-26
Payer: COMMERCIAL

## 2024-12-26 DIAGNOSIS — E78.2 MIXED HYPERLIPIDEMIA: Primary | ICD-10-CM

## 2024-12-27 ENCOUNTER — LAB VISIT (OUTPATIENT)
Dept: LAB | Facility: HOSPITAL | Age: 54
End: 2024-12-27
Attending: FAMILY MEDICINE
Payer: COMMERCIAL

## 2024-12-27 DIAGNOSIS — E78.2 MIXED HYPERLIPIDEMIA: ICD-10-CM

## 2024-12-27 LAB
ALBUMIN SERPL BCP-MCNC: 3.9 G/DL (ref 3.5–5.2)
ALP SERPL-CCNC: 83 U/L (ref 40–150)
ALT SERPL W/O P-5'-P-CCNC: 38 U/L (ref 10–44)
ANION GAP SERPL CALC-SCNC: 9 MMOL/L (ref 8–16)
AST SERPL-CCNC: 23 U/L (ref 10–40)
BASOPHILS # BLD AUTO: 0.04 K/UL (ref 0–0.2)
BASOPHILS NFR BLD: 0.8 % (ref 0–1.9)
BILIRUB SERPL-MCNC: 0.5 MG/DL (ref 0.1–1)
BUN SERPL-MCNC: 15 MG/DL (ref 6–20)
CALCIUM SERPL-MCNC: 9.4 MG/DL (ref 8.7–10.5)
CHLORIDE SERPL-SCNC: 104 MMOL/L (ref 95–110)
CHOLEST SERPL-MCNC: 256 MG/DL (ref 120–199)
CHOLEST/HDLC SERPL: 4.6 {RATIO} (ref 2–5)
CO2 SERPL-SCNC: 24 MMOL/L (ref 23–29)
CREAT SERPL-MCNC: 0.7 MG/DL (ref 0.5–1.4)
DIFFERENTIAL METHOD BLD: ABNORMAL
EOSINOPHIL # BLD AUTO: 0.1 K/UL (ref 0–0.5)
EOSINOPHIL NFR BLD: 2.3 % (ref 0–8)
ERYTHROCYTE [DISTWIDTH] IN BLOOD BY AUTOMATED COUNT: 11.8 % (ref 11.5–14.5)
EST. GFR  (NO RACE VARIABLE): >60 ML/MIN/1.73 M^2
GLUCOSE SERPL-MCNC: 102 MG/DL (ref 70–110)
HCT VFR BLD AUTO: 38.7 % (ref 37–48.5)
HDLC SERPL-MCNC: 56 MG/DL (ref 40–75)
HDLC SERPL: 21.9 % (ref 20–50)
HGB BLD-MCNC: 13 G/DL (ref 12–16)
IMM GRANULOCYTES # BLD AUTO: 0.01 K/UL (ref 0–0.04)
IMM GRANULOCYTES NFR BLD AUTO: 0.2 % (ref 0–0.5)
LDLC SERPL CALC-MCNC: 153.8 MG/DL (ref 63–159)
LYMPHOCYTES # BLD AUTO: 1.4 K/UL (ref 1–4.8)
LYMPHOCYTES NFR BLD: 30 % (ref 18–48)
MCH RBC QN AUTO: 32.3 PG (ref 27–31)
MCHC RBC AUTO-ENTMCNC: 33.6 G/DL (ref 32–36)
MCV RBC AUTO: 96 FL (ref 82–98)
MONOCYTES # BLD AUTO: 0.4 K/UL (ref 0.3–1)
MONOCYTES NFR BLD: 9.3 % (ref 4–15)
NEUTROPHILS # BLD AUTO: 2.7 K/UL (ref 1.8–7.7)
NEUTROPHILS NFR BLD: 57.4 % (ref 38–73)
NONHDLC SERPL-MCNC: 200 MG/DL
NRBC BLD-RTO: 0 /100 WBC
PLATELET # BLD AUTO: 308 K/UL (ref 150–450)
PMV BLD AUTO: 9.1 FL (ref 9.2–12.9)
POTASSIUM SERPL-SCNC: 4.2 MMOL/L (ref 3.5–5.1)
PROT SERPL-MCNC: 7.1 G/DL (ref 6–8.4)
RBC # BLD AUTO: 4.03 M/UL (ref 4–5.4)
SODIUM SERPL-SCNC: 137 MMOL/L (ref 136–145)
TRIGL SERPL-MCNC: 231 MG/DL (ref 30–150)
WBC # BLD AUTO: 4.74 K/UL (ref 3.9–12.7)

## 2024-12-27 PROCEDURE — 36415 COLL VENOUS BLD VENIPUNCTURE: CPT | Performed by: FAMILY MEDICINE

## 2024-12-27 PROCEDURE — 80053 COMPREHEN METABOLIC PANEL: CPT | Performed by: FAMILY MEDICINE

## 2024-12-27 PROCEDURE — 80061 LIPID PANEL: CPT | Performed by: FAMILY MEDICINE

## 2024-12-27 PROCEDURE — 85025 COMPLETE CBC W/AUTO DIFF WBC: CPT | Performed by: FAMILY MEDICINE

## 2025-01-03 ENCOUNTER — OFFICE VISIT (OUTPATIENT)
Dept: FAMILY MEDICINE | Facility: CLINIC | Age: 55
End: 2025-01-03
Payer: COMMERCIAL

## 2025-01-03 VITALS
OXYGEN SATURATION: 98 % | SYSTOLIC BLOOD PRESSURE: 128 MMHG | WEIGHT: 212.75 LBS | DIASTOLIC BLOOD PRESSURE: 76 MMHG | BODY MASS INDEX: 29.78 KG/M2 | HEART RATE: 68 BPM | HEIGHT: 71 IN

## 2025-01-03 DIAGNOSIS — E78.2 MIXED HYPERLIPIDEMIA: ICD-10-CM

## 2025-01-03 DIAGNOSIS — E66.3 OVERWEIGHT (BMI 25.0-29.9): ICD-10-CM

## 2025-01-03 DIAGNOSIS — F41.1 GAD (GENERALIZED ANXIETY DISORDER): ICD-10-CM

## 2025-01-03 DIAGNOSIS — N95.1 PERIMENOPAUSAL: ICD-10-CM

## 2025-01-03 DIAGNOSIS — Z00.00 ANNUAL PHYSICAL EXAM: Primary | ICD-10-CM

## 2025-01-03 PROCEDURE — 3078F DIAST BP <80 MM HG: CPT | Mod: CPTII,S$GLB,, | Performed by: FAMILY MEDICINE

## 2025-01-03 PROCEDURE — 99999 PR PBB SHADOW E&M-EST. PATIENT-LVL III: CPT | Mod: PBBFAC,,, | Performed by: FAMILY MEDICINE

## 2025-01-03 PROCEDURE — 1159F MED LIST DOCD IN RCRD: CPT | Mod: CPTII,S$GLB,, | Performed by: FAMILY MEDICINE

## 2025-01-03 PROCEDURE — 3074F SYST BP LT 130 MM HG: CPT | Mod: CPTII,S$GLB,, | Performed by: FAMILY MEDICINE

## 2025-01-03 PROCEDURE — 3008F BODY MASS INDEX DOCD: CPT | Mod: CPTII,S$GLB,, | Performed by: FAMILY MEDICINE

## 2025-01-03 PROCEDURE — 99396 PREV VISIT EST AGE 40-64: CPT | Mod: S$GLB,,, | Performed by: FAMILY MEDICINE

## 2025-01-03 RX ORDER — CIPROFLOXACIN 500 MG/1
500 TABLET ORAL 2 TIMES DAILY
COMMUNITY
Start: 2024-12-31

## 2025-01-03 NOTE — PROGRESS NOTES
(Portions of this note were dictated using voice recognition software and may contain dictation related errors in spelling/grammar/syntax not found on text review)    CC:   Chief Complaint   Patient presents with    Annual Exam       HPI: 54 y.o. female presented for routine annual examination and labs.  She has medical history significant for anxiety and depression, mixed hyperlipidemia, primary osteoarthritis of both knees.    She has been taking Celexa 40 mg for the past several years, she stated that symptoms of anxiety and depression are well controlled now and she would like to wean herself off of it.    She is currently taking ciprofloxacin prescribed at urgent care for UTI symptoms, states after starting the antibiotics symptoms has been getting better.      She is due for annual labs.      She does not smoke, has no toxic habits.      She is physically active and tries to do exercise.      She denies having any other symptoms or concerns.    Past Medical History:   Diagnosis Date    Anxiety     Depression     Kidney stones, age 16, 38 3/21/2018    Mixed hyperlipidemia 7/18/2019    Primary osteoarthritis of both knees 3/23/2021    Urinary tract infection 8/30/2013    Staphylococcus saprophyticus       Past Surgical History:   Procedure Laterality Date    COLONOSCOPY N/A 11/2/2018    Procedure: COLONOSCOPY;  Surgeon: Young Pink MD;  Location: [facility] ([address] [place] [address]);  Service: Endoscopy;  Laterality: N/A;    COLONOSCOPY N/A 11/2/2023    Procedure: COLONOSCOPY;  Surgeon: Fede Ribera MD;  Location: [facility];  Service: Endoscopy;  Laterality: N/A;       Family History   Problem Relation Name Age of Onset    Hypertension Mother      Hypertension Father      Breast cancer Maternal Uncle  60    Breast cancer Paternal Aunt  70    Colon cancer Neg Hx      Ovarian cancer Neg Hx         Social History     Tobacco Use    Smoking status: Never    Smokeless tobacco: Never   Substance Use Topics    Alcohol use:  Yes     Alcohol/week: 2.0 standard drinks of alcohol     Types: 2 Glasses of wine per week     Comment: 2 times per week    Drug use: No       Lab Results   Component Value Date    WBC 4.74 12/27/2024    HGB 13.0 12/27/2024    HCT 38.7 12/27/2024    MCV 96 12/27/2024     12/27/2024    CHOL 256 (H) 12/27/2024    TRIG 231 (H) 12/27/2024    HDL 56 12/27/2024    ALT 38 12/27/2024    AST 23 12/27/2024    BILITOT 0.5 12/27/2024    ALKPHOS 83 12/27/2024     12/27/2024    K 4.2 12/27/2024     12/27/2024    CREATININE 0.7 12/27/2024    ESTGFRAFRICA >60 07/29/2022    EGFRNONAA >60 07/29/2022    CALCIUM 9.4 12/27/2024    ALBUMIN 3.9 12/27/2024    BUN 15 12/27/2024    CO2 24 12/27/2024    TSH 2.631 10/18/2022    INR 1.0 05/29/2022    HGBA1C 5.0 06/21/2021    LDLCALC 153.8 12/27/2024     12/27/2024             Vital signs reviewed  PE:   APPEARANCE: Well nourished, well developed, in no acute distress.    HEAD: Normocephalic, atraumatic.  EYES: EOMI.  Conjunctivae noninjected.  NOSE: Mucosa pink. Airway clear.  NECK: Supple with no cervical lymphadenopathy.    CHEST: Good inspiratory effort. Lungs clear to auscultation with no wheezes or crackles.  CARDIOVASCULAR: Normal S1, S2. No rubs, murmurs, or gallops.  ABDOMEN: Bowel sounds normal. Not distended. Soft. No tenderness or masses. No organomegaly.  EXTREMITIES: No edema, cyanosis, or clubbing.    Review of Systems   Constitutional:  Negative for activity change and unexpected weight change.   HENT:  Negative for hearing loss, rhinorrhea and trouble swallowing.    Eyes:  Negative for discharge and visual disturbance.   Respiratory:  Negative for chest tightness and wheezing.    Cardiovascular:  Negative for chest pain and palpitations.   Gastrointestinal:  Positive for constipation. Negative for blood in stool, diarrhea and vomiting.   Endocrine: Negative for polydipsia and polyuria.   Genitourinary:  Positive for dysuria. Negative for difficulty  urinating, hematuria and menstrual problem.   Musculoskeletal:  Negative for arthralgias, joint swelling and neck pain.   Neurological:  Negative for weakness and headaches.   Psychiatric/Behavioral:  Negative for confusion and dysphoric mood.    All other systems reviewed and are negative.      IMPRESSION  1. Annual physical exam    2. Mixed hyperlipidemia    3. SANDHYA (generalized anxiety disorder)    4. Perimenopausal    5. Overweight (BMI 25.0-29.9)            PLAN      1. Annual physical exam (Primary)    Annual labs reviewed with the patient      2. Mixed hyperlipidemia    Cholesterol 256, triglyceride 231     Advised on dietary modification with regular exercise      3. SANDHYA (generalized anxiety disorder)    Stable     Advised to wean off Celexa      4. Perimenopausal      5. Overweight (BMI 25.0-29.9)    Counseling provided on healthy lifestyle, encouraged to do moderate intensity regular exercise 30 minutes every day 5 days a week, to include vegetables and fruits and cut back on saturated fats and carbohydrates.          SCREENINGS      Immunizations:     Up-to-date with Tdap     Up-to-date with COVID vaccines      Age/demographic appropriate health maintenance:    Up-to-date with Pap smear     Up-to-date with colonoscopy     Mammogram scheduled      Health Maintenance Due   Topic Date Due    Hemoglobin A1c (Diabetic Prevention Screening)  06/21/2024    Mammogram  02/24/2025           Spent adequate time in obtaining history and explaining differentials     30 minutes spent during this visit of which greater than 50% devoted to face-face counseling and coordination of care regarding diagnosis and management plan       Marlon Awan   1/3/2025

## 2025-02-25 ENCOUNTER — HOSPITAL ENCOUNTER (OUTPATIENT)
Dept: RADIOLOGY | Facility: HOSPITAL | Age: 55
Discharge: HOME | End: 2025-02-25
Attending: FAMILY MEDICINE
Payer: COMMERCIAL

## 2025-02-25 VITALS — HEIGHT: 71 IN | WEIGHT: 212 LBS | BODY MASS INDEX: 29.68 KG/M2

## 2025-02-25 DIAGNOSIS — Z12.31 OTHER SCREENING MAMMOGRAM: ICD-10-CM

## 2025-02-25 PROCEDURE — 77067 SCR MAMMO BI INCL CAD: CPT | Mod: 26,,, | Performed by: RADIOLOGY

## 2025-02-25 PROCEDURE — 77063 BREAST TOMOSYNTHESIS BI: CPT | Mod: 26,,, | Performed by: RADIOLOGY

## 2025-02-25 PROCEDURE — 77063 BREAST TOMOSYNTHESIS BI: CPT | Mod: TC

## 2025-03-20 ENCOUNTER — TELEPHONE (OUTPATIENT)
Dept: FAMILY MEDICINE | Facility: CLINIC | Age: 55
End: 2025-03-20
Payer: COMMERCIAL

## 2025-03-20 ENCOUNTER — OFFICE VISIT (OUTPATIENT)
Dept: UROLOGY | Facility: CLINIC | Age: 55
End: 2025-03-20
Payer: COMMERCIAL

## 2025-03-20 ENCOUNTER — PATIENT MESSAGE (OUTPATIENT)
Dept: FAMILY MEDICINE | Facility: CLINIC | Age: 55
End: 2025-03-20
Payer: COMMERCIAL

## 2025-03-20 VITALS
SYSTOLIC BLOOD PRESSURE: 123 MMHG | WEIGHT: 211.44 LBS | DIASTOLIC BLOOD PRESSURE: 77 MMHG | HEART RATE: 80 BPM | BODY MASS INDEX: 29.49 KG/M2

## 2025-03-20 DIAGNOSIS — N39.0 RECURRENT UTI: ICD-10-CM

## 2025-03-20 DIAGNOSIS — N39.0 RECURRENT UTI: Primary | ICD-10-CM

## 2025-03-20 DIAGNOSIS — R30.0 DYSURIA: Primary | ICD-10-CM

## 2025-03-20 DIAGNOSIS — R10.9 FLANK PAIN: ICD-10-CM

## 2025-03-20 PROCEDURE — 99999 PR PBB SHADOW E&M-EST. PATIENT-LVL III: CPT | Mod: PBBFAC,,,

## 2025-03-20 PROCEDURE — 87086 URINE CULTURE/COLONY COUNT: CPT

## 2025-03-20 RX ORDER — NITROFURANTOIN 25; 75 MG/1; MG/1
100 CAPSULE ORAL 2 TIMES DAILY
Qty: 10 CAPSULE | Refills: 0 | Status: SHIPPED | OUTPATIENT
Start: 2025-03-20 | End: 2025-03-25

## 2025-03-20 RX ORDER — NITROFURANTOIN 25; 75 MG/1; MG/1
100 CAPSULE ORAL 2 TIMES DAILY
Qty: 10 CAPSULE | Refills: 0 | Status: SHIPPED | OUTPATIENT
Start: 2025-03-20 | End: 2025-03-20

## 2025-03-20 NOTE — PROGRESS NOTES
[facility]      New Recurrent Urinary Tract Infections Note    3/20/2025    Referred by:  Marlon Awan MD    History of Present Illness    CHIEF COMPLAINT:  Patient presents today for recurrent urinary tract infections.    URINARY SYMPTOMS:  She reports experiencing sudden onset urinary symptoms characterized by burning, pain, and urgency. This is her third course of antibiotics in the last 6 months. A urine culture from October showed no growth. She started Cipro today after being seen in an [facility] urgent care. UA was nitrite positive at that visit and culture is still pending.     KIDNEY PAIN:  She reports intermittent dull kidney pain for the past 3 years, occurring approximately every 6 months. During episodes, pain becomes severe enough to cause doubling over and nausea without emesis. Pain resolves spontaneously after a few minutes. She has a history of kidney stones, which she describes as distinctly different, characterized by sharp, razor-like pain compared to her current dull pain.    ALLERGIES:  She has an allergy to Tylenol    SOCIAL HISTORY:  She is a never smoker with childhood exposure to secondhand smoke approximately 50 years ago.        A review of 10+ systems was conducted with pertinent positive and negative findings documented in HPI with all other systems reviewed and negative.    Past medical, family, surgical and social history reviewed as documented in chart with pertinent positive medical, family, surgical and social history detailed in HPI.    Exam Findings:    NOTE:  the exam was carried out with a nurse chaperone present  Normal external female genitalia  Urethral meatus is normal  Vaginal Mucosa: normal  Cath  mL (not a true PVR - pt did not void prior to exam) Const: no acute distress, conversant and alert  Eyes: anicteric, extraocular muscles intact  ENMT: normocephalic, Nl oral membranes  Cardio: no cyanosis, nl cap refill  Pulm: no tachypnea; no resp  distress  Abd: soft, no tenderness  Musc: no laceration, no tenderness  Neuro: alert; oriented x 3  Skin: warm, dry; no petichiae  Psych: no anxiety; normal speech       Assessment/Plan:    Assessment & Plan    IMPRESSION:  - Suspect interstitial cystitis (IC) mimicking recurrent UTIs due to lack of culture-proven infections and persistent symptoms despite antibiotic treatment.  - Will obtain catheterized urine specimen for culture to confirm presence of UTI before continuing antibiotic treatment.    INTERSTITIAL CYSTITIS:  1. Explained interstitial cystitis as condition that can mimic UTI symptoms.  2. Educated on bladder irritants (acidic foods, spicy foods, caffeine, artificial sweeteners) that can exacerbate or trigger IC symptoms.  3. Recommend avoiding these irritants.  4. Explained the benefits of alkaline water in managing IC flares by reducing urine acidity.  5. Patient to switch to alkaline water, especially during symptom flares.  6. Recommend OTC aloe vera capsules.    URINARY TRACT INFECTION:  1. Discussed how frequent antibiotic use without confirmed infections can disrupt normal vaginal indu, potentially leading to actual UTIs.  2. Emphasized the importance of culture-proven UTIs before antibiotic treatment to avoid unnecessary medication and potential complications.  3. Discontinued Ciprofloxacin due to potential side effects and lack of confirmed UTI.  4. Started Macrobid (nitrofurantoin) as empiric treatment pending culture results.  5. Sent catheterized urine for culture.     FOLLOW-UP:  1. Ordered renal US to rule out more serious conditions given patient's increased level of concern concern.  2. Will follow up after urine culture results.       I spent a total of 60 minutes on the day of the visit.  This includes face to face time and non-face to face time preparing to see the patient (eg, review of tests), obtaining and/or reviewing separately obtained history, documenting clinical information  in the electronic or other health record, independently interpreting results and communicating results to the patient/family/caregiver, or care coordinator.

## 2025-03-21 LAB — BACTERIA UR CULT: NO GROWTH

## 2025-03-24 ENCOUNTER — RESULTS FOLLOW-UP (OUTPATIENT)
Dept: UROLOGY | Facility: CLINIC | Age: 55
End: 2025-03-24

## 2025-03-24 ENCOUNTER — HOSPITAL ENCOUNTER (OUTPATIENT)
Dept: RADIOLOGY | Facility: HOSPITAL | Age: 55
Discharge: HOME | End: 2025-03-24
Payer: COMMERCIAL

## 2025-03-24 DIAGNOSIS — R10.9 FLANK PAIN: ICD-10-CM

## 2025-03-24 PROCEDURE — 76770 US EXAM ABDO BACK WALL COMP: CPT | Mod: TC

## 2025-03-24 PROCEDURE — 76770 US EXAM ABDO BACK WALL COMP: CPT | Mod: 26,,, | Performed by: STUDENT IN AN ORGANIZED HEALTH CARE EDUCATION/TRAINING PROGRAM

## 2025-04-14 ENCOUNTER — PATIENT MESSAGE (OUTPATIENT)
Dept: UROLOGY | Facility: CLINIC | Age: 55
End: 2025-04-14
Payer: COMMERCIAL

## 2025-04-15 ENCOUNTER — OFFICE VISIT (OUTPATIENT)
Dept: UROLOGY | Facility: CLINIC | Age: 55
End: 2025-04-15
Payer: COMMERCIAL

## 2025-04-15 VITALS
SYSTOLIC BLOOD PRESSURE: 138 MMHG | HEART RATE: 79 BPM | WEIGHT: 213.63 LBS | BODY MASS INDEX: 29.79 KG/M2 | DIASTOLIC BLOOD PRESSURE: 84 MMHG

## 2025-04-15 DIAGNOSIS — R39.89 URETHRAL PAIN: ICD-10-CM

## 2025-04-15 DIAGNOSIS — R30.0 DYSURIA: ICD-10-CM

## 2025-04-15 DIAGNOSIS — N30.10 BLADDER PAIN SYNDROME: Primary | ICD-10-CM

## 2025-04-15 PROCEDURE — 3075F SYST BP GE 130 - 139MM HG: CPT | Mod: CPTII,S$GLB,,

## 2025-04-15 PROCEDURE — 87086 URINE CULTURE/COLONY COUNT: CPT

## 2025-04-15 PROCEDURE — 3079F DIAST BP 80-89 MM HG: CPT | Mod: CPTII,S$GLB,,

## 2025-04-15 PROCEDURE — 1159F MED LIST DOCD IN RCRD: CPT | Mod: CPTII,S$GLB,,

## 2025-04-15 PROCEDURE — 1160F RVW MEDS BY RX/DR IN RCRD: CPT | Mod: CPTII,S$GLB,,

## 2025-04-15 PROCEDURE — 3008F BODY MASS INDEX DOCD: CPT | Mod: CPTII,S$GLB,,

## 2025-04-15 PROCEDURE — 99214 OFFICE O/P EST MOD 30 MIN: CPT | Mod: 25,S$GLB,,

## 2025-04-15 PROCEDURE — 99999 PR PBB SHADOW E&M-EST. PATIENT-LVL III: CPT | Mod: PBBFAC,,,

## 2025-04-15 PROCEDURE — 51701 INSERT BLADDER CATHETER: CPT | Mod: S$GLB,,,

## 2025-04-15 RX ORDER — ZINC GLUCONATE 13.3 MG
200 LOZENGE ORAL DAILY PRN
Qty: 30 TABLET | Refills: 11 | Status: SHIPPED | OUTPATIENT
Start: 2025-04-15 | End: 2026-04-15

## 2025-04-15 RX ORDER — AMITRIPTYLINE HYDROCHLORIDE 25 MG/1
25 TABLET, FILM COATED ORAL NIGHTLY
Qty: 30 TABLET | Refills: 11 | Status: SHIPPED | OUTPATIENT
Start: 2025-04-15

## 2025-04-15 NOTE — PROGRESS NOTES
[facility]      Return Interstitial Cystitis/Bladder Pain Syndrome (BPS) Note    4/15/2025    Referred by:  No ref. provider found    History of Present Illness    CHIEF COMPLAINT:  Patient presents today for dysuria and urethral pain.    URINARY SYMPTOMS:  She reports urethral pain and burning sensation. Previous urine cultures have shown no growth.    MEDICATIONS:  She has been taking Cipro as needed for urinary symptoms prior to obtaining cultures, most recently yesterday morning.        A review of 10+ systems was conducted with pertinent positive and negative findings documented in HPI with all other systems reviewed and negative.    Past medical, family, surgical and social history reviewed as documented in chart with pertinent positive medical, family, surgical and social history detailed in HPI.    Exam Findings:    NOTE:  the exam was carried out with a nurse chaperone present  Normal external female genitalia  Urethral meatus is normal  Vaginal Mucosa: normal  Cath PVR 65 mL Const: no acute distress, conversant and alert  Eyes: anicteric, extraocular muscles intact  ENMT: normocephalic, Nl oral membranes  Cardio: no cyanosis, nl cap refill  Pulm: no tachypnea; no resp distress  Abd: soft, no tenderness  Musc: no laceration, no tenderness  Neuro: alert; oriented x 3  Skin: warm, dry; no petichiae  Psych: no anxiety; normal speech       Assessment/Plan:    Assessment & Plan    IMPRESSION:  - Considered interstitial cystitis as potential diagnosis due to persistent symptoms, although difficult to determine given antibiotic use prior to cultures.   - If initial treatment ineffective, plan to switch to gabapentin and potentially consider bladder installation.    INTERSTITIAL CYSTITIS:  1. Initiated treatment approach for interstitial cystitis with amitriptyline 25 mg daily and cimetidine 200 mg daily.    OTHER SPECIFIED DISORDERS OF URINARY SYSTEM:  1. Ordered urine culture to check for  atypical organisms.   2. Will call with culture results in 3-5 days and treat accordingly.       I spent a total of 30 minutes on the day of the visit.This includes face to face time and non-face to face time preparing to see the patient (eg, review of tests), obtaining and/or reviewing separately obtained history, documenting clinical information in the electronic or other health record, independently interpreting results and communicating results to the patient/family/caregiver, or care coordinator.     Visit complexity today is associated with medical care services that are part of the ongoing care related to the single serious and/or complex condition of Interstitial cystitis (BPS/IC). A longitudinal relationship exists or is being developed between the patient and this practitioner for the care of this condition.

## 2025-04-16 LAB — BACTERIA UR CULT: NO GROWTH

## 2025-04-21 ENCOUNTER — RESULTS FOLLOW-UP (OUTPATIENT)
Dept: UROLOGY | Facility: CLINIC | Age: 55
End: 2025-04-21

## 2025-06-11 ENCOUNTER — OFFICE VISIT (OUTPATIENT)
Dept: OBSTETRICS AND GYNECOLOGY | Facility: CLINIC | Age: 55
End: 2025-06-11
Payer: COMMERCIAL

## 2025-06-11 VITALS
SYSTOLIC BLOOD PRESSURE: 138 MMHG | BODY MASS INDEX: 29.79 KG/M2 | DIASTOLIC BLOOD PRESSURE: 86 MMHG | WEIGHT: 213.63 LBS

## 2025-06-11 DIAGNOSIS — Z01.419 WELL WOMAN EXAM WITH ROUTINE GYNECOLOGICAL EXAM: Primary | ICD-10-CM

## 2025-06-11 PROCEDURE — 99386 PREV VISIT NEW AGE 40-64: CPT | Mod: S$GLB,,,

## 2025-06-11 PROCEDURE — 99999 PR PBB SHADOW E&M-EST. PATIENT-LVL II: CPT | Mod: PBBFAC,,,

## 2025-06-11 PROCEDURE — 3079F DIAST BP 80-89 MM HG: CPT | Mod: CPTII,S$GLB,,

## 2025-06-11 PROCEDURE — 3075F SYST BP GE 130 - 139MM HG: CPT | Mod: CPTII,S$GLB,,

## 2025-06-11 PROCEDURE — 3008F BODY MASS INDEX DOCD: CPT | Mod: CPTII,S$GLB,,

## 2025-06-11 PROCEDURE — 1160F RVW MEDS BY RX/DR IN RCRD: CPT | Mod: CPTII,S$GLB,,

## 2025-06-11 PROCEDURE — 1159F MED LIST DOCD IN RCRD: CPT | Mod: CPTII,S$GLB,,

## 2025-06-11 NOTE — PROGRESS NOTES
SUBJECTIVE:   55 y.o. female  presents for annual well woman exam.   No LMP recorded. Age of first menarche: 13/16.   at age 53, not on HRT. Denies  bleeding.  Is currently sexually active with male.  Declines STD testing with blood work.  Denies any abnormal bleeding, vaginal pain, discharge, itching, irritation, or odor.   Denies any breast, urinary, or bowel complaints.   Denies domestic violence.      Pap 2022-normal pap, HPV negative  -History of abnormal pap-denies  MMG 2025-normal  Colonoscopy 2023-due              Past Medical History:   Diagnosis Date    Anxiety     Depression     Kidney stones, age 16, 38 3/21/2018    Mixed hyperlipidemia 2019    Primary osteoarthritis of both knees 3/23/2021    Urinary tract infection 2013    Staphylococcus saprophyticus     Past Surgical History:   Procedure Laterality Date    COLONOSCOPY N/A 2018    Procedure: COLONOSCOPY;  Surgeon: Young Pink MD;  Location: [address];  Service: Endoscopy;  Laterality: N/A;    COLONOSCOPY N/A 2023    Procedure: COLONOSCOPY;  Surgeon: Fede Ribera MD;  Location: [facility];  Service: Endoscopy;  Laterality: N/A;     Social History[1]  Family History   Problem Relation Name Age of Onset    Hypertension Mother      Hypertension Father      Breast cancer Maternal Uncle  60    Breast cancer Paternal Aunt  70    Colon cancer Neg Hx      Ovarian cancer Neg Hx       OB History    Para Term  AB Living   0  0      SAB IAB Ectopic Multiple Live Births                Current Medications[2]  Allergies: Tylenol [acetaminophen]     ROS:  Constitutional: no weight loss, weight gain, fever, fatigue  Eyes:  No vision changes, glasses/contacts  ENT/Mouth: No ulcers, sinus problems, ears ringing, headache  Cardiovascular: No inability to lie flat, chest pain, exercise intolerance, swelling, heart palpitations  Respiratory: No wheezing, coughing blood, shortness of breath, or  cough  Gastrointestinal: No diarrhea, bloody stool, nausea/vomiting, constipation, gas, hemorrhoids  Genitourinary: See HPI  Musculoskeletal: No muscle weakness  Skin/Breast: No painful breasts, nipple discharge, masses, rash, ulcers  Neurological: No passing out, seizures, numbness, headache  Endocrine: No hot flashes, hair loss, abnormal hair growth, ance  Psychiatric: No depression, crying  Hematologic: No bruises, bleeding, swollen lymph nodes, anemia.      OBJECTIVE:   The patient appears well, alert, oriented x 3, in no distress.  /86 (Patient Position: Sitting)   Wt 96.9 kg (213 lb 10 oz)   BMI 29.79 kg/m²   NECK: no thyromegaly, trachea midline  SKIN: no acne, striae, hirsutism  BREAST EXAM: breasts appear normal, no suspicious masses, no skin or nipple changes or axillary nodes  ABDOMEN: no hernias, masses, or hepatosplenomegaly  GENITALIA: normal external genitalia, no erythema, no discharge  URETHRA: normal urethra, normal urethral meatus  VAGINA: mucosal atrophy  CERVIX: no lesions or cervical motion tenderness  UTERUS: normal  ADNEXA: no mass, fullness, tenderness  There is mild right sided pelvic floor tenderness      ASSESSMENT:   Diagnoses and all orders for this visit:    Well woman exam with routine gynecological exam        No orders of the defined types were placed in this encounter.      Follow up in 1 year for annual exam or as needed.  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, safe from take overs, happy with job    No children     Social Drivers of Health     Financial Resource Strain: Low Risk  (4/24/2024)    Overall Financial Resource Strain (CARDIA)     Difficulty of Paying Living Expenses: Not hard at all   Food Insecurity: No Food Insecurity (4/24/2024)    Hunger Vital Sign     Worried About Running Out of Food in the Last Year: Never true     Ran Out of Food in the Last Year: Never true   Transportation Needs: No Transportation Needs (4/24/2024)    PRAPARE - Transportation     Lack of Transportation (Medical): No     Lack of Transportation (Non-Medical): No   Physical Activity: Insufficiently Active (4/24/2024)    Exercise Vital Sign     Days of Exercise per Week: 3 days     Minutes of Exercise per Session: 30 min   Stress: No Stress Concern Present (4/24/2024)    [employer] [place] [facility] [employer] - Occupational Stress Questionnaire     Feeling of Stress : Not at all   Housing Stability: Low Risk  (6/16/2023)    Housing Stability Vital Sign     Unable to Pay for Housing in the Last Year: No     Number of Places Lived in the Last Year: 1     Unstable Housing in the Last Year: No   [2]   Current Outpatient Medications   Medication Sig Dispense Refill    amitriptyline (ELAVIL) 25 MG tablet Take 1 tablet (25 mg total) by mouth every evening. (Patient not taking: Reported on 6/11/2025) 30 tablet 11    cimetidine (TAGAMET) 200 MG tablet Take 1 tablet (200 mg total) by mouth daily as needed (as needed for bladder pain). (Patient not taking: Reported on 6/11/2025) 30 tablet 11    citalopram (CELEXA) 40 MG tablet Take 1 tablet (40 mg total) by mouth once daily. 90 tablet 3    mv,Ca,min-iron-FA-guarana-caff (ONE-A-DAY WOMEN'S ACTIVE) 18 mg iron- 400 mcg-180 mg Tab Take 1 tablet by mouth once daily. (Patient not taking: Reported on 6/11/2025)       No current facility-administered medications for this visit.